# Patient Record
Sex: FEMALE | Race: WHITE | HISPANIC OR LATINO | Employment: OTHER | URBAN - METROPOLITAN AREA
[De-identification: names, ages, dates, MRNs, and addresses within clinical notes are randomized per-mention and may not be internally consistent; named-entity substitution may affect disease eponyms.]

---

## 2017-07-10 DIAGNOSIS — R14.0 BLOATING SYMPTOM: ICD-10-CM

## 2017-07-10 DIAGNOSIS — K21.9 GASTROESOPHAGEAL REFLUX DISEASE: ICD-10-CM

## 2017-07-10 DIAGNOSIS — R10.13 ABDOMINAL PAIN, EPIGASTRIC: Primary | ICD-10-CM

## 2017-07-24 ENCOUNTER — HOSPITAL ENCOUNTER (OUTPATIENT)
Dept: RADIOLOGY | Facility: OTHER | Age: 72
Discharge: HOME | End: 2017-07-24
Attending: INTERNAL MEDICINE
Payer: MEDICARE

## 2017-07-24 DIAGNOSIS — R14.0 BLOATING SYMPTOM: ICD-10-CM

## 2017-07-24 DIAGNOSIS — R10.13 ABDOMINAL PAIN, EPIGASTRIC: ICD-10-CM

## 2017-07-24 DIAGNOSIS — K21.9 GASTROESOPHAGEAL REFLUX DISEASE: ICD-10-CM

## 2017-07-24 PROCEDURE — 76700 US EXAM ABDOM COMPLETE: CPT | Mod: TC

## 2017-07-24 PROCEDURE — 76700 US EXAM ABDOM COMPLETE: CPT | Mod: 26,,, | Performed by: RADIOLOGY

## 2017-07-31 ENCOUNTER — HOSPITAL ENCOUNTER (OUTPATIENT)
Dept: RADIOLOGY | Facility: OTHER | Age: 72
Discharge: HOME | End: 2017-07-31
Attending: INTERNAL MEDICINE
Payer: MEDICARE

## 2017-07-31 DIAGNOSIS — K22.2 ESOPHAGEAL OBSTRUCTION: ICD-10-CM

## 2017-07-31 DIAGNOSIS — R14.0 BLOATING SYMPTOM: ICD-10-CM

## 2017-07-31 DIAGNOSIS — T18.2XXA FOREIGN BODY OF STOMACH, INITIAL ENCOUNTER: ICD-10-CM

## 2017-07-31 DIAGNOSIS — K21.9 GASTROESOPHAGEAL REFLUX: ICD-10-CM

## 2017-07-31 DIAGNOSIS — R10.13 ABDOMINAL PAIN, EPIGASTRIC: ICD-10-CM

## 2017-07-31 PROCEDURE — 78264 GASTRIC EMPTYING IMG STUDY: CPT | Mod: 26,,, | Performed by: RADIOLOGY

## 2017-07-31 PROCEDURE — 78264 GASTRIC EMPTYING IMG STUDY: CPT | Mod: TC

## 2018-12-04 ENCOUNTER — CLINICAL SUPPORT (OUTPATIENT)
Dept: AUDIOLOGY | Facility: CLINIC | Age: 73
End: 2018-12-04
Payer: MEDICARE

## 2018-12-04 ENCOUNTER — OFFICE VISIT (OUTPATIENT)
Dept: OTOLARYNGOLOGY | Facility: CLINIC | Age: 73
End: 2018-12-04
Payer: MEDICARE

## 2018-12-04 VITALS
WEIGHT: 160 LBS | SYSTOLIC BLOOD PRESSURE: 134 MMHG | DIASTOLIC BLOOD PRESSURE: 70 MMHG | HEART RATE: 75 BPM | TEMPERATURE: 98 F

## 2018-12-04 DIAGNOSIS — H90.3 SENSORINEURAL HEARING LOSS, BILATERAL: ICD-10-CM

## 2018-12-04 DIAGNOSIS — H93.11 TINNITUS, RIGHT EAR: ICD-10-CM

## 2018-12-04 DIAGNOSIS — J34.3 HYPERTROPHY OF INFERIOR NASAL TURBINATE: Primary | ICD-10-CM

## 2018-12-04 DIAGNOSIS — J35.1 TONSILLAR HYPERTROPHY: ICD-10-CM

## 2018-12-04 DIAGNOSIS — H69.91 NEGATIVE MIDDLE EAR PRESSURE OF RIGHT EAR: ICD-10-CM

## 2018-12-04 DIAGNOSIS — H93.13 TINNITUS, BILATERAL: Primary | ICD-10-CM

## 2018-12-04 PROCEDURE — 99999 PR PBB SHADOW E&M-EST. PATIENT-LVL III: CPT | Mod: PBBFAC,,, | Performed by: OTOLARYNGOLOGY

## 2018-12-04 PROCEDURE — 1100F PTFALLS ASSESS-DOCD GE2>/YR: CPT | Mod: CPTII,S$GLB,, | Performed by: OTOLARYNGOLOGY

## 2018-12-04 PROCEDURE — 3288F FALL RISK ASSESSMENT DOCD: CPT | Mod: CPTII,S$GLB,, | Performed by: OTOLARYNGOLOGY

## 2018-12-04 PROCEDURE — 92567 TYMPANOMETRY: CPT | Mod: S$GLB,,, | Performed by: AUDIOLOGIST

## 2018-12-04 PROCEDURE — 92557 COMPREHENSIVE HEARING TEST: CPT | Mod: S$GLB,,, | Performed by: AUDIOLOGIST

## 2018-12-04 PROCEDURE — 99203 OFFICE O/P NEW LOW 30 MIN: CPT | Mod: S$GLB,,, | Performed by: OTOLARYNGOLOGY

## 2018-12-04 RX ORDER — PREDNISONE 2.5 MG/1
TABLET ORAL DAILY
COMMUNITY
Start: 2014-11-24 | End: 2020-11-09

## 2018-12-04 RX ORDER — LISINOPRIL AND HYDROCHLOROTHIAZIDE 12.5; 2 MG/1; MG/1
1 TABLET ORAL DAILY
COMMUNITY
Start: 2018-09-20

## 2018-12-04 RX ORDER — DICLOFENAC SODIUM 10 MG/G
GEL TOPICAL
COMMUNITY
Start: 2015-04-09 | End: 2021-05-24

## 2018-12-04 RX ORDER — METHOTREXATE 2.5 MG/1
TABLET ORAL
COMMUNITY
Start: 2018-10-04 | End: 2018-12-04

## 2018-12-04 RX ORDER — METHOTREXATE 2.5 MG/1
TABLET ORAL
COMMUNITY
Start: 2014-11-24

## 2018-12-04 RX ORDER — ATORVASTATIN CALCIUM 20 MG/1
20 TABLET, FILM COATED ORAL DAILY
COMMUNITY
Start: 2018-11-05

## 2018-12-04 RX ORDER — DICLOFENAC SODIUM 10 MG/G
GEL TOPICAL
COMMUNITY
Start: 2018-11-06 | End: 2018-12-04

## 2018-12-04 RX ORDER — PREDNISONE 1 MG/1
1 TABLET ORAL 2 TIMES DAILY
COMMUNITY
Start: 2018-10-05 | End: 2022-08-24 | Stop reason: DRUGHIGH

## 2018-12-04 RX ORDER — AMLODIPINE BESYLATE 5 MG/1
5 TABLET ORAL DAILY
COMMUNITY
Start: 2018-11-05

## 2018-12-04 NOTE — PATIENT INSTRUCTIONS
Audiometry reviewed: asymmetric AD > AS SNHL  Pt. is a candidate for amplification for one ( right) or both ears  Tinnitus literature provided  Gentle middle ear insufflation techniques encouraged  E.T. Literature provided  Monitor hearing yearly  Noise protection encouraged prn

## 2018-12-04 NOTE — PROGRESS NOTES
"Subjective:       Patient ID: Naye Duncan is a 73 y.o. female.    Chief Complaint: Tinnitus    HPI: Ms. Duncan is a 73-year-old  female whose chief complaint is perception of a high-frequency "EEEEE", "cricket" type noise perceived in her right ear only x 6 months to 1 year in duration.  She had experienced tinnitus before but it went away spontaneously.  She indicates a previous history of ear fluid in earaches.  She indicates seeing a female ENT physician located in a practice on [facility] at [facility]; she was treated with pills ( Lipoflavanoids?) which the insurance did not cover; they did not help her tinnitus perception.  She is hoping I have some other treatment option which may help her aggravating tinnitus perception in the right ear. She has never worn hearing aids or considered them.  She used  to work at a [employer] facility for 8 years, 39 years ago.  She was exposed to high noise levels then.  She denies a significant family history of hearing loss.  She denies any significant history of head trauma or ear surgery or significant ear infections.  She is a right-handed individual.  She is receiving physical therapy for back and leg and neck problems.  Today's visit is her first here since July 2017 (for GI problems).    I have access to some of her medical records obtained from  the [facility] several years ago( 2014/2015) . Her medical problem list has included GERD, hypertension, hyperlipidemia, arthritis, asthma, IBS and impaired fasting glucose.  A note dated 04/09/2015 the [facility] Department indicated the patient's positive NAYE titer and history of arthritis treated with methotrexate +/-a prednisone burst.  She has a history of Nissen fundoplication for GERD symptoms.    A previous EGD had shown erosive esophagitis in stricture with symptoms improved by use of a PPI +/-H2 blocker. The notes also indicates her history of carpal tunnel " problems.    Allergies:  Penicillins  Review of Systems  History reviewed. No pertinent past medical history.  History reviewed. No pertinent surgical history.  Current Outpatient Medications on File Prior to Visit   Medication Sig Dispense Refill    Al hyd-Mg tr-alg ac-sod bicarb 80-20 mg Chew Take 1 tablet by mouth.      amLODIPine (NORVASC) 5 MG tablet       atorvastatin (LIPITOR) 20 MG tablet       diclofenac sodium (VOLTAREN) 1 % Gel Apply small amount to affected area q 8 hr PRN pain      lisinopril-hydrochlorothiazide (PRINZIDE,ZESTORETIC) 20-12.5 mg per tablet       medical supply, miscellaneous (MISCELLANEOUS MEDICAL SUPPLY MISC) 1 pair of knee high compression stockings      methotrexate 2.5 MG Tab       predniSONE (DELTASONE) 1 MG tablet       predniSONE (DELTASONE) 2.5 MG tablet        No current facility-administered medications on file prior to visit.            The patient completed an audiometric study performed by the [facility] audiology service.  The study is duplicated below and the results are reviewed with the patient in detail  Objective:         Blood pressure 134/70 pulse 75 temperature 98.3° weight 160 lb  General:  Alert and oriented lady in no acute distress  Both ears were examined under the microscope in the micro procedure room  Physical Exam   HENT:   Ears:    Nose:       Mouth/Throat:           Assessment:       1. Hypertrophy of inferior nasal turbinate    2. Tonsillar hypertrophy    3. Asymmetrical hearing loss of both ears    4. Tinnitus, right ear    5. Negative middle ear pressure of right ear        Plan:     Audiometry reviewed: asymmetric AD > AS SNHL  Pt. is a candidate for amplification for one ( right) or both ears  Tinnitus literature provided  Gentle middle ear insufflation techniques encouraged  E.T. Literature provided  Monitor hearing yearly  Noise protection encouraged prn

## 2019-08-01 ENCOUNTER — HOSPITAL ENCOUNTER (EMERGENCY)
Facility: HOSPITAL | Age: 74
Discharge: HOME | End: 2019-08-01
Attending: EMERGENCY MEDICINE
Payer: MEDICARE

## 2019-08-01 VITALS
RESPIRATION RATE: 16 BRPM | HEART RATE: 96 BPM | TEMPERATURE: 99 F | OXYGEN SATURATION: 98 % | SYSTOLIC BLOOD PRESSURE: 154 MMHG | DIASTOLIC BLOOD PRESSURE: 68 MMHG

## 2019-08-01 DIAGNOSIS — R94.31 ABNORMAL ECG: ICD-10-CM

## 2019-08-01 DIAGNOSIS — R42 DIZZINESS: Primary | ICD-10-CM

## 2019-08-01 LAB
ALBUMIN SERPL BCP-MCNC: 3.7 G/DL (ref 3.5–5.2)
ALP SERPL-CCNC: 88 U/L (ref 55–135)
ALT SERPL W/O P-5'-P-CCNC: 21 U/L (ref 10–44)
ANION GAP SERPL CALC-SCNC: 11 MMOL/L (ref 8–16)
AST SERPL-CCNC: 16 U/L (ref 10–40)
BASOPHILS # BLD AUTO: 0.03 K/UL (ref 0–0.2)
BASOPHILS NFR BLD: 0.3 % (ref 0–1.9)
BILIRUB SERPL-MCNC: 0.4 MG/DL (ref 0.1–1)
BUN SERPL-MCNC: 21 MG/DL (ref 8–23)
CALCIUM SERPL-MCNC: 9.6 MG/DL (ref 8.7–10.5)
CHLORIDE SERPL-SCNC: 103 MMOL/L (ref 95–110)
CO2 SERPL-SCNC: 25 MMOL/L (ref 23–29)
CREAT SERPL-MCNC: 0.9 MG/DL (ref 0.5–1.4)
DIFFERENTIAL METHOD: ABNORMAL
EOSINOPHIL # BLD AUTO: 0 K/UL (ref 0–0.5)
EOSINOPHIL NFR BLD: 0.2 % (ref 0–8)
ERYTHROCYTE [DISTWIDTH] IN BLOOD BY AUTOMATED COUNT: 15.9 % (ref 11.5–14.5)
EST. GFR  (AFRICAN AMERICAN): >60 ML/MIN/1.73 M^2
EST. GFR  (NON AFRICAN AMERICAN): >60 ML/MIN/1.73 M^2
GLUCOSE SERPL-MCNC: 196 MG/DL (ref 70–110)
HCT VFR BLD AUTO: 39.6 % (ref 37–48.5)
HGB BLD-MCNC: 12.4 G/DL (ref 12–16)
IMM GRANULOCYTES # BLD AUTO: 0.05 K/UL (ref 0–0.04)
IMM GRANULOCYTES NFR BLD AUTO: 0.5 % (ref 0–0.5)
INR PPP: 0.9 (ref 0.8–1.2)
LYMPHOCYTES # BLD AUTO: 1.4 K/UL (ref 1–4.8)
LYMPHOCYTES NFR BLD: 13.1 % (ref 18–48)
MAGNESIUM SERPL-MCNC: 2.2 MG/DL (ref 1.6–2.6)
MCH RBC QN AUTO: 28.4 PG (ref 27–31)
MCHC RBC AUTO-ENTMCNC: 31.3 G/DL (ref 32–36)
MCV RBC AUTO: 91 FL (ref 82–98)
MONOCYTES # BLD AUTO: 0.8 K/UL (ref 0.3–1)
MONOCYTES NFR BLD: 7.3 % (ref 4–15)
NEUTROPHILS # BLD AUTO: 8.5 K/UL (ref 1.8–7.7)
NEUTROPHILS NFR BLD: 78.6 % (ref 38–73)
NRBC BLD-RTO: 0 /100 WBC
PHOSPHATE SERPL-MCNC: 3 MG/DL (ref 2.7–4.5)
PLATELET # BLD AUTO: 276 K/UL (ref 150–350)
PMV BLD AUTO: 10.1 FL (ref 9.2–12.9)
POTASSIUM SERPL-SCNC: 3.4 MMOL/L (ref 3.5–5.1)
PROT SERPL-MCNC: 7.6 G/DL (ref 6–8.4)
PROTHROMBIN TIME: 9.7 SEC (ref 9–12.5)
RBC # BLD AUTO: 4.37 M/UL (ref 4–5.4)
SODIUM SERPL-SCNC: 139 MMOL/L (ref 136–145)
TROPONIN I SERPL DL<=0.01 NG/ML-MCNC: <0.006 NG/ML (ref 0–0.03)
TSH SERPL DL<=0.005 MIU/L-ACNC: 1.03 UIU/ML (ref 0.4–4)
WBC # BLD AUTO: 10.85 K/UL (ref 3.9–12.7)

## 2019-08-01 PROCEDURE — 99283 EMERGENCY DEPT VISIT LOW MDM: CPT | Mod: ,,, | Performed by: EMERGENCY MEDICINE

## 2019-08-01 PROCEDURE — 93010 ELECTROCARDIOGRAM REPORT: CPT | Mod: ,,, | Performed by: INTERNAL MEDICINE

## 2019-08-01 PROCEDURE — 93010 EKG 12-LEAD: ICD-10-PCS | Mod: ,,, | Performed by: INTERNAL MEDICINE

## 2019-08-01 PROCEDURE — 84443 ASSAY THYROID STIM HORMONE: CPT

## 2019-08-01 PROCEDURE — 83735 ASSAY OF MAGNESIUM: CPT

## 2019-08-01 PROCEDURE — 80053 COMPREHEN METABOLIC PANEL: CPT

## 2019-08-01 PROCEDURE — 85610 PROTHROMBIN TIME: CPT

## 2019-08-01 PROCEDURE — 84484 ASSAY OF TROPONIN QUANT: CPT

## 2019-08-01 PROCEDURE — 93005 ELECTROCARDIOGRAM TRACING: CPT

## 2019-08-01 PROCEDURE — 63600175 PHARM REV CODE 636 W HCPCS: Performed by: EMERGENCY MEDICINE

## 2019-08-01 PROCEDURE — 85025 COMPLETE CBC W/AUTO DIFF WBC: CPT

## 2019-08-01 PROCEDURE — 25000003 PHARM REV CODE 250: Performed by: EMERGENCY MEDICINE

## 2019-08-01 PROCEDURE — 84100 ASSAY OF PHOSPHORUS: CPT

## 2019-08-01 PROCEDURE — 99285 EMERGENCY DEPT VISIT HI MDM: CPT | Mod: 25

## 2019-08-01 PROCEDURE — 99283 PR EMERGENCY DEPT VISIT,LEVEL III: ICD-10-PCS | Mod: ,,, | Performed by: EMERGENCY MEDICINE

## 2019-08-01 RX ORDER — POTASSIUM CHLORIDE 20 MEQ/15ML
60 SOLUTION ORAL
Status: COMPLETED | OUTPATIENT
Start: 2019-08-01 | End: 2019-08-01

## 2019-08-01 RX ORDER — MECLIZINE HCL 12.5 MG 12.5 MG/1
12.5 TABLET ORAL 3 TIMES DAILY PRN
Qty: 21 TABLET | Refills: 0 | Status: SHIPPED | OUTPATIENT
Start: 2019-08-01 | End: 2020-11-09

## 2019-08-01 RX ORDER — MECLIZINE HCL 12.5 MG 12.5 MG/1
25 TABLET ORAL
Status: COMPLETED | OUTPATIENT
Start: 2019-08-01 | End: 2019-08-01

## 2019-08-01 RX ADMIN — MECLIZINE 25 MG: 12.5 TABLET ORAL at 09:08

## 2019-08-01 RX ADMIN — SODIUM CHLORIDE, SODIUM LACTATE, POTASSIUM CHLORIDE, AND CALCIUM CHLORIDE 1000 ML: .6; .31; .03; .02 INJECTION, SOLUTION INTRAVENOUS at 09:08

## 2019-08-01 RX ADMIN — POTASSIUM CHLORIDE 60 MEQ: 20 SOLUTION ORAL at 09:08

## 2019-08-02 NOTE — ED TRIAGE NOTES
"Pt reports heart palpitation, dizziness.  pt states " I have problem with my heart and my doctor advised me to come to ED" denies chest pain, sob, n/v. Pt reports left arm pain few days ago but states she it was her arthritis. Woke up today feeling really dizzy.      LOC: The patient is awake, alert, aware of environment with an appropriate affect. Oriented x4, speaking appropriately  APPEARANCE: Pt resting comfortably, in no acute distress, pt is clean and well groomed, clothing properly fastened  SKIN:The skin is warm and dry, color consistent with ethnicity, patient has normal skin turgor and moist mucus membranes  RESPIRATORY:Airway is open and patent, respirations are spontaneous, patient has a normal effort and rate, no accessory muscle use noted.  CARDIAC: fast rate and irregular rhythm, no peripheral edema noted, capillary refill < 3 seconds, bilateral radial pulses 2+.  NEUROLOGIC: PERRLA, facial expression is symmetrical, patient moving all extremities spontaneously, normal sensation in all extremities when touched with a finger.  Follows all commands appropriately  MUSCULOSKELETAL: Patient moving all extremities spontaneously, no obvious swelling or deformities noted.         "

## 2019-08-02 NOTE — ED PROVIDER NOTES
Encounter Date: 8/1/2019    SCRIBE #1 NOTE: I, Magdaleno De León, am scribing for, and in the presence of,  Dr. Greenberg. I have scribed the entire note.       History     Chief Complaint   Patient presents with    Abnormal ECG     [facility] from [facility] due to abn EKG. Patient reports dizziness. Denies CP or SOB     Patient is a 74 year old female who presents to the ED with an abnormal EKG. Patient notes dizziness since this morning.and right shoulder pain since Tuesday night. Also mentions sweating last night as well.  Went to doctors of [facility] who sent the patient here. Dizziness has improved since waking up. Patient had fusion of a herniated disc at L3 and L4. Also mentions having a fever a few days ago. Denies coughing, shortness of breath, difficulty swallowing, nausea, or vomiting. States she does not have a pacemaker. States ears are at baseline. No fever in the past 24 hours but said she had some feverish symptoms about 3 days ago.     The history is provided by the patient.     Review of patient's allergies indicates:   Allergen Reactions    Penicillins      No past medical history on file.  No past surgical history on file.  No family history on file.  Social History     Tobacco Use    Smoking status: Never Smoker   Substance Use Topics    Alcohol use: Not on file    Drug use: Not on file     Review of Systems   Constitutional: Positive for diaphoresis. Negative for fever.   HENT: Negative for trouble swallowing.    Respiratory: Negative for cough and shortness of breath.    Cardiovascular: Positive for chest pain. Negative for palpitations.   Gastrointestinal: Negative for nausea and vomiting.   Endocrine: Positive for polydipsia.   Musculoskeletal:        Shoulder pain to right.     Neurological: Positive for dizziness.   All other systems reviewed and are negative.      Physical Exam     Initial Vitals [08/01/19 1812]   BP Pulse Resp Temp SpO2   (!) 154/68 96 16 99.3 °F (37.4 °C) 98 %       MAP       --         Physical Exam    Vitals reviewed.  Constitutional: She appears well-developed and well-nourished.   HENT:   Head: Normocephalic and atraumatic.   Mouth/Throat: No oral lesions.   Eyes: EOM are normal. Pupils are equal, round, and reactive to light.   Neck: No tracheal deviation present. No JVD present.   Cardiovascular: An irregularly irregular rhythm present.    Pulmonary/Chest: Breath sounds normal. No stridor. No respiratory distress.   Abdominal: Soft. Bowel sounds are normal. She exhibits no distension. There is no tenderness.   Neurological: She is alert. She has normal strength and normal reflexes.   Patient describes mild dizziness. Able to ambulate unassisted.         ED Course   Procedures  Labs Reviewed   CBC W/ AUTO DIFFERENTIAL - Abnormal; Notable for the following components:       Result Value    Mean Corpuscular Hemoglobin Conc 31.3 (*)     RDW 15.9 (*)     Gran # (ANC) 8.5 (*)     Immature Grans (Abs) 0.05 (*)     Gran% 78.6 (*)     Lymph% 13.1 (*)     All other components within normal limits   COMPREHENSIVE METABOLIC PANEL - Abnormal; Notable for the following components:    Potassium 3.4 (*)     Glucose 196 (*)     All other components within normal limits   PROTIME-INR   TSH   TROPONIN I   MAGNESIUM   PHOSPHORUS   URINALYSIS, REFLEX TO URINE CULTURE     EKG Readings: (Independently Interpreted)   Rhythm: Sinus Arrhythmia. Heart Rate: 96. Ectopy: Less than or equal to 6/min PACs. ST Segments: Normal ST Segments. Axis: Normal. Other Findings: Shortened IN Interval.       Imaging Results          X-Ray Chest PA And Lateral (Final result)  Result time 08/01/19 20:39:22    Final result by Sharmila Rodney MD (08/01/19 20:39:22)                 Impression:      No failure or pneumonia.      Electronically signed by: Sharmila Rodney  Date:    08/01/2019  Time:    20:39             Narrative:    EXAMINATION:  XR CHEST PA AND LATERAL    CLINICAL  HISTORY:  dizziness;    TECHNIQUE:  PA and lateral views of the chest were performed.    COMPARISON:  None    FINDINGS:  Frontal lateral views presented.  Heart appears normal in size on this modest inspiratory exam.  There is a 5 mm calcified granuloma the right inferior lung incidentally noted.  No pneumothorax or pleural effusion or interstitial edema or consolidation.  No alveolar pattern.  Trachea appears normal.  Visualized bowel gas pattern appears normal.  No fracture found.                              X-Rays:   Independently Interpreted Readings:   Other Readings:  9:20PM  CXR:No acute findings    Medical Decision Making:   History:   Old Medical Records: I decided to obtain old medical records.  Differential Diagnosis:   Congenital heart disease vs. vertigo vs electrolyte derangement vs. dehydration  Independently Interpreted Test(s):   I have ordered and independently interpreted X-rays - see prior notes.  I have ordered and independently interpreted EKG Reading(s) - see prior notes  Clinical Tests:   Lab Tests: Ordered and Reviewed  Radiological Study: Ordered and Reviewed  Medical Tests: Ordered and Reviewed  Other:   I have discussed this case with another health care provider.       <> Summary of the Discussion: Cardiology on-call has reviewed the EKG and does not feel that it presents any evidence of acute arrhythmia or indication for treatment at this time.            Scribe Attestation:   Scribe #1: I performed the above scribed service and the documentation accurately describes the services I performed. I attest to the accuracy of the note.    Attending Attestation:             Attending ED Notes:   Laboratory evaluation today reveals evidence of minimal hypokalemia and mild granulocytosis only without evidence of anemia, electrolyte derangement, or solid organ injury. EKG does reveal findings of multiple PACs and a shortened TN interval in this patient presenting with report of chronic  congenital heart disease of unknown diagnosis but without associated chest pain or shortness of breath.  She reports that her presenting complaint of dizziness has been improved with emergency department therapy.  I do not feel that further imaging or inpatient evaluation is indicated at this time for this patient with improving, recurrent vertigo symptoms.  She will be discharged home in improved condition with prescriptions for meclizine to be taken as needed for recurrent episodes of dizziness, and I have recommended she follow up with Cardiology to further evaluate her congenital cardiac disease and return to the ED as needed for urgent concerns.             Clinical Impression:       ICD-10-CM ICD-9-CM   1. Dizziness R42 780.4   2. Abnormal ECG R94.31 794.31         Disposition:   Disposition: Discharged  Condition: Stable                        Gerson Greenberg MD  08/01/19 [id number]

## 2019-08-03 ENCOUNTER — HOSPITAL ENCOUNTER (EMERGENCY)
Facility: HOSPITAL | Age: 74
Discharge: HOME | End: 2019-08-03
Attending: EMERGENCY MEDICINE
Payer: MEDICARE

## 2019-08-03 VITALS
HEART RATE: 76 BPM | TEMPERATURE: 99 F | RESPIRATION RATE: 19 BRPM | SYSTOLIC BLOOD PRESSURE: 139 MMHG | OXYGEN SATURATION: 96 % | DIASTOLIC BLOOD PRESSURE: 62 MMHG | WEIGHT: 154 LBS | BODY MASS INDEX: 28.34 KG/M2 | HEIGHT: 62 IN

## 2019-08-03 DIAGNOSIS — K52.9 COLITIS: Primary | ICD-10-CM

## 2019-08-03 DIAGNOSIS — K92.2 GI BLEED: ICD-10-CM

## 2019-08-03 LAB
ABO + RH BLD: NORMAL
ALBUMIN SERPL BCP-MCNC: 3.7 G/DL (ref 3.5–5.2)
ALP SERPL-CCNC: 77 U/L (ref 55–135)
ALT SERPL W/O P-5'-P-CCNC: 17 U/L (ref 10–44)
ANION GAP SERPL CALC-SCNC: 14 MMOL/L (ref 8–16)
APTT BLDCRRT: 23 SEC (ref 21–32)
AST SERPL-CCNC: 18 U/L (ref 10–40)
BASOPHILS # BLD AUTO: 0.03 K/UL (ref 0–0.2)
BASOPHILS NFR BLD: 0.2 % (ref 0–1.9)
BILIRUB SERPL-MCNC: 0.5 MG/DL (ref 0.1–1)
BLD GP AB SCN CELLS X3 SERPL QL: NORMAL
BUN SERPL-MCNC: 23 MG/DL (ref 8–23)
BUN SERPL-MCNC: 27 MG/DL (ref 6–30)
CALCIUM SERPL-MCNC: 9.4 MG/DL (ref 8.7–10.5)
CHLORIDE SERPL-SCNC: 107 MMOL/L (ref 95–110)
CHLORIDE SERPL-SCNC: 107 MMOL/L (ref 95–110)
CO2 SERPL-SCNC: 20 MMOL/L (ref 23–29)
CREAT SERPL-MCNC: 0.7 MG/DL (ref 0.5–1.4)
CREAT SERPL-MCNC: 0.8 MG/DL (ref 0.5–1.4)
DIFFERENTIAL METHOD: ABNORMAL
EOSINOPHIL # BLD AUTO: 0.1 K/UL (ref 0–0.5)
EOSINOPHIL NFR BLD: 1 % (ref 0–8)
ERYTHROCYTE [DISTWIDTH] IN BLOOD BY AUTOMATED COUNT: 16.1 % (ref 11.5–14.5)
EST. GFR  (AFRICAN AMERICAN): >60 ML/MIN/1.73 M^2
EST. GFR  (NON AFRICAN AMERICAN): >60 ML/MIN/1.73 M^2
GLUCOSE SERPL-MCNC: 102 MG/DL (ref 70–110)
GLUCOSE SERPL-MCNC: 98 MG/DL (ref 70–110)
HCT VFR BLD AUTO: 39.2 % (ref 37–48.5)
HCT VFR BLD CALC: 38 %PCV (ref 36–54)
HGB BLD-MCNC: 12.6 G/DL (ref 12–16)
IMM GRANULOCYTES # BLD AUTO: 0.06 K/UL (ref 0–0.04)
IMM GRANULOCYTES NFR BLD AUTO: 0.5 % (ref 0–0.5)
INR PPP: 0.9 (ref 0.8–1.2)
LYMPHOCYTES # BLD AUTO: 1.8 K/UL (ref 1–4.8)
LYMPHOCYTES NFR BLD: 13.9 % (ref 18–48)
MCH RBC QN AUTO: 28.4 PG (ref 27–31)
MCHC RBC AUTO-ENTMCNC: 32.1 G/DL (ref 32–36)
MCV RBC AUTO: 88 FL (ref 82–98)
MONOCYTES # BLD AUTO: 1 K/UL (ref 0.3–1)
MONOCYTES NFR BLD: 8 % (ref 4–15)
NEUTROPHILS # BLD AUTO: 9.7 K/UL (ref 1.8–7.7)
NEUTROPHILS NFR BLD: 76.4 % (ref 38–73)
NRBC BLD-RTO: 0 /100 WBC
PLATELET # BLD AUTO: 319 K/UL (ref 150–350)
PMV BLD AUTO: 10 FL (ref 9.2–12.9)
POC IONIZED CALCIUM: 1.1 MMOL/L (ref 1.06–1.42)
POC TCO2 (MEASURED): 24 MMOL/L (ref 23–29)
POTASSIUM BLD-SCNC: 3.9 MMOL/L (ref 3.5–5.1)
POTASSIUM SERPL-SCNC: 4 MMOL/L (ref 3.5–5.1)
PROT SERPL-MCNC: 7.4 G/DL (ref 6–8.4)
PROTHROMBIN TIME: 9.6 SEC (ref 9–12.5)
RBC # BLD AUTO: 4.44 M/UL (ref 4–5.4)
SAMPLE: NORMAL
SODIUM BLD-SCNC: 141 MMOL/L (ref 136–145)
SODIUM SERPL-SCNC: 141 MMOL/L (ref 136–145)
WBC # BLD AUTO: 12.7 K/UL (ref 3.9–12.7)

## 2019-08-03 PROCEDURE — 80053 COMPREHEN METABOLIC PANEL: CPT

## 2019-08-03 PROCEDURE — 93010 ELECTROCARDIOGRAM REPORT: CPT | Mod: ,,, | Performed by: INTERNAL MEDICINE

## 2019-08-03 PROCEDURE — 96361 HYDRATE IV INFUSION ADD-ON: CPT

## 2019-08-03 PROCEDURE — 25000003 PHARM REV CODE 250: Performed by: EMERGENCY MEDICINE

## 2019-08-03 PROCEDURE — 93010 EKG 12-LEAD: ICD-10-PCS | Mod: ,,, | Performed by: INTERNAL MEDICINE

## 2019-08-03 PROCEDURE — 99284 EMERGENCY DEPT VISIT MOD MDM: CPT | Mod: ,,, | Performed by: EMERGENCY MEDICINE

## 2019-08-03 PROCEDURE — 85730 THROMBOPLASTIN TIME PARTIAL: CPT

## 2019-08-03 PROCEDURE — 85610 PROTHROMBIN TIME: CPT

## 2019-08-03 PROCEDURE — 99284 PR EMERGENCY DEPT VISIT,LEVEL IV: ICD-10-PCS | Mod: ,,, | Performed by: EMERGENCY MEDICINE

## 2019-08-03 PROCEDURE — 86901 BLOOD TYPING SEROLOGIC RH(D): CPT

## 2019-08-03 PROCEDURE — 63600175 PHARM REV CODE 636 W HCPCS: Performed by: EMERGENCY MEDICINE

## 2019-08-03 PROCEDURE — 99284 EMERGENCY DEPT VISIT MOD MDM: CPT | Mod: 25

## 2019-08-03 PROCEDURE — 25500020 PHARM REV CODE 255: Performed by: EMERGENCY MEDICINE

## 2019-08-03 PROCEDURE — 93005 ELECTROCARDIOGRAM TRACING: CPT

## 2019-08-03 PROCEDURE — 85025 COMPLETE CBC W/AUTO DIFF WBC: CPT

## 2019-08-03 PROCEDURE — 96374 THER/PROPH/DIAG INJ IV PUSH: CPT | Mod: 59

## 2019-08-03 RX ORDER — DICYCLOMINE HYDROCHLORIDE 10 MG/1
20 CAPSULE ORAL
Status: COMPLETED | OUTPATIENT
Start: 2019-08-03 | End: 2019-08-03

## 2019-08-03 RX ORDER — DIPHENHYDRAMINE HYDROCHLORIDE 50 MG/ML
25 INJECTION INTRAMUSCULAR; INTRAVENOUS
Status: COMPLETED | OUTPATIENT
Start: 2019-08-03 | End: 2019-08-03

## 2019-08-03 RX ORDER — PANTOPRAZOLE SODIUM 40 MG/10ML
40 INJECTION, POWDER, LYOPHILIZED, FOR SOLUTION INTRAVENOUS
Status: DISCONTINUED | OUTPATIENT
Start: 2019-08-03 | End: 2019-08-03

## 2019-08-03 RX ORDER — METRONIDAZOLE 500 MG/1
500 TABLET ORAL EVERY 8 HOURS
Qty: 30 TABLET | Refills: 0 | Status: SHIPPED | OUTPATIENT
Start: 2019-08-03 | End: 2019-08-13

## 2019-08-03 RX ORDER — CIPROFLOXACIN 500 MG/1
500 TABLET ORAL EVERY 12 HOURS
Qty: 20 TABLET | Refills: 0 | Status: SHIPPED | OUTPATIENT
Start: 2019-08-03 | End: 2019-08-13

## 2019-08-03 RX ADMIN — DICYCLOMINE HYDROCHLORIDE 20 MG: 10 CAPSULE ORAL at 08:08

## 2019-08-03 RX ADMIN — SODIUM CHLORIDE 1000 ML: 0.9 INJECTION, SOLUTION INTRAVENOUS at 08:08

## 2019-08-03 RX ADMIN — IOHEXOL 100 ML: 350 INJECTION, SOLUTION INTRAVENOUS at 10:08

## 2019-08-03 RX ADMIN — DIPHENHYDRAMINE HYDROCHLORIDE 25 MG: 50 INJECTION INTRAMUSCULAR; INTRAVENOUS at 10:08

## 2019-08-03 NOTE — ED PROVIDER NOTES
"Encounter Date: 8/3/2019       History     Chief Complaint   Patient presents with    Rectal Bleeding     diarrhea yesterday and noticed "light red" blood, "dark stool", pt "thought it was her hemorrhoids". not on blood thinners. seen in ER on 8/1 for palpitations, but had no rectal bleeding then. had low K.      73 yo W with pmhx of RA on MTX, HTN, hemorrhoids presents with a chief complaint of BRBPR.  Patient reports onset of symptoms was yesterday.  Patient had an episode of severe diarrhea, lasting 2 hours in length.  Subsequently, she had an episode of dark red blood mixed with stool.  Since that time, she has had 5 additional episodes of BRBPR.  She reports blood is bright red.  No clots.  Episodes occur even when she does not feel the urge to have a bowel movement.  This is associated with lightheadedness and cramping generalized abdominal pain. No history of BRBPR.  No anticoagulants.  Patient does take a daily ASA 81.  She denies any chest pain or shortness of breath. Abdominal surgical history remarkable for a tummy tuck as well as an unknown procedure on an ulcer in the 1980s.  Her last colonoscopy was in 1988.  No history of similar symptoms. Patient initially attributed her symptoms to hemorrhoids.  Of note, patient was in the ED 2 days prior for palpitations and found to have a sinus arrhythmia.        Review of patient's allergies indicates:   Allergen Reactions    Penicillins      Past Medical History:   Diagnosis Date    Arthritis     GERD (gastroesophageal reflux disease)     HTN (hypertension)      History reviewed. No pertinent surgical history.  History reviewed. No pertinent family history.  Social History     Tobacco Use    Smoking status: Never Smoker   Substance Use Topics    Alcohol use: Not on file    Drug use: Not on file     Review of Systems   Constitutional: Positive for fatigue. Negative for fever.   HENT: Negative for congestion.    Eyes: Negative for discharge. "   Respiratory: Negative for shortness of breath.    Cardiovascular: Negative for chest pain.   Gastrointestinal: Positive for abdominal pain, blood in stool and diarrhea. Negative for abdominal distention, constipation, nausea and vomiting.   Endocrine: Negative for polyuria.   Genitourinary: Negative for dysuria.   Musculoskeletal: Negative for back pain.   Skin: Negative for wound.   Allergic/Immunologic: Negative for immunocompromised state.   Neurological: Negative for headaches.   Hematological: Does not bruise/bleed easily.   Psychiatric/Behavioral: Negative for confusion.       Physical Exam     Initial Vitals [08/03/19 0719]   BP Pulse Resp Temp SpO2   134/83 93 18 98.5 °F (36.9 °C) 99 %      MAP       --         Physical Exam    Nursing note and vitals reviewed.  Constitutional: She appears well-developed and well-nourished. She is not diaphoretic. No distress.   HENT:   Head: Normocephalic and atraumatic.   Eyes: EOM are normal. Pupils are equal, round, and reactive to light. Right eye exhibits no discharge. Left eye exhibits no discharge. No scleral icterus.   Conjunctival pallor   Neck: Normal range of motion. Neck supple. No JVD present.   Cardiovascular: Normal rate, normal heart sounds and intact distal pulses. An irregular rhythm present.  Exam reveals no gallop and no friction rub.    No murmur heard.  Pulmonary/Chest: Breath sounds normal. No respiratory distress. She has no wheezes. She has no rhonchi. She has no rales. She exhibits no tenderness.   Abdominal: Soft. Bowel sounds are normal. She exhibits no distension and no mass. There is tenderness. There is no rebound and no guarding.   RLQ tenderness with voluntary guarding, mild suprapubic and LLQ tenderness   Genitourinary: Rectal exam shows guaiac positive stool. Guaiac positive stool. : Acceptable.  Genitourinary Comments: Dried clots and perirectal area, and vault, guaiac positive; small external hemorrhoid appreciated at  5 o'clock position   Musculoskeletal: Normal range of motion. She exhibits no edema or tenderness.   Lymphadenopathy:     She has no cervical adenopathy.   Neurological: She is alert and oriented to person, place, and time. She has normal strength. No sensory deficit.   Skin: Skin is warm and dry. Capillary refill takes less than 2 seconds.   Psychiatric:   Mildly anxious         ED Course   Procedures  Labs Reviewed   CBC W/ AUTO DIFFERENTIAL - Abnormal; Notable for the following components:       Result Value    RDW 16.1 (*)     Gran # (ANC) 9.7 (*)     Immature Grans (Abs) 0.06 (*)     Gran% 76.4 (*)     Lymph% 13.9 (*)     All other components within normal limits   COMPREHENSIVE METABOLIC PANEL - Abnormal; Notable for the following components:    CO2 20 (*)     All other components within normal limits   PROTIME-INR   APTT   TYPE & SCREEN   ISTAT PROCEDURE        ECG Results          EKG 12-lead (Final result)  Result time 08/03/19 10:27:24    Final result by Interface, Lab In [facility] (08/03/19 10:27:24)                 Narrative:    Test Reason : K92.2,    Vent. Rate : 081 BPM     Atrial Rate : 081 BPM     P-R Int : 128 ms          QRS Dur : 062 ms      QT Int : 388 ms       P-R-T Axes : 081 043 048 degrees     QTc Int : 450 ms    Sinus rhythm with Premature atrial complexes  Low voltage QRS  Abnormal ECG  When compared with ECG of 01-AUG-2019 18:16,  Sinus rhythm has replaced Junctional rhythm  Nonspecific T wave abnormality has replaced inverted T waves in Inferior  leads  Confirmed by RAMIRO FORD MD ([id number]) on 8/3/2019 10:27:13 AM    Referred By: [email or web address]   SELF           Confirmed By:RAMIRO FORD MD                            Imaging Results           CTA Abdomen and Pelvis (Final result)  Result time 08/03/19 10:43:24    Final result by Ramón Gutiérrez MD (08/03/19 10:43:24)                 Impression:      Long segment of bowel wall thickening and pericolonic fat stranding involving the splenic  flexure and the transverse colon concerning for active colitis.    Colonic diverticulosis.    No evidence of intraluminal contrast extravasation in the bowel to suggest active bleeding.    Other incidental findings as above.    This report was flagged in Epic as abnormal.    Electronically signed by resident: Kevin Barnes MD  Date:    08/03/2019  Time:    10:21    Electronically signed by: Ramón Gutiérrez MD  Date:    08/03/2019  Time:    10:43             Narrative:    EXAMINATION:  CTA ABDOMEN AND PELVIS    CLINICAL HISTORY:  GI bleeding;    TECHNIQUE:  Low dose axial images, sagittal and coronal reformations were obtained from the lung bases to the pubic symphysis before and after the IV administration of 100 mL of Omnipaque 350 .  Oral contrast was not administered.  Delayed images were obtained per GI bleed protocol.    COMPARISON:  Abdominal ultrasound 07/24/2017.    FINDINGS:  Heart size within normal limits.  No significant pericardial effusion.    - Lung bases: The lung bases are clear with no evidence of consolidation or pleural effusion.  Subcentimeter calcified granuloma within the right lower lobe.    - Liver: Normal in size and enhancement.  No evidence of focal lesions.    - Gallbladder: No calcified gallstones.    - Bile Ducts: No evidence of intra or extra hepatic biliary ductal dilation.    - Spleen: Unremarkable.    - Kidneys: Both kidneys demonstrate normal size and enhancement.  Mild bilateral cortical thinning.  No evidence of hydronephrosis or hydroureter.  No evidence of nephrolithiasis.  The bladder is partially distended and appear unremarkable.    - Adrenals: Unremarkable.    - Pancreas: No mass or peripancreatic fat stranding.    - Retroperitoneum:  No significant adenopathy.    - Vascular: Calcific atherosclerosis of the abdominal aorta and its branches with no evidence of aneurysmal dilatation.    - Abdominal wall:  Unremarkable.    Pelvis:    No pelvic mass, adenopathy, or free fluid.   The uterus is surgically absent.    Bowel/Mesentery:    Changes of a prior gastric surgery.    Extensive colonic diverticulosis.  There is a long segment of bowel wall thickening and pericolonic fat stranding involving the splenic flexure and the transverse colon concerning for active colitis.  No evidence of free intraperitoneal air or free fluid.  No evidence of bowel dilatation or bowel obstruction.  No evidence of intraluminal contrast extravasation in the bowel to suggest active bleeding.    Bones:  No acute osseous abnormality and no suspicious lytic or blastic lesion.  Degenerative changes of the spine with postsurgical changes of posterior decompression laminectomy of the lower lumbar spine.                                 Medical Decision Making:   History:   I obtained history from: someone other than patient.  Old Medical Records: I decided to obtain old medical records.  Initial Assessment:   75 yo W with pmhx of RA on MTX, HTN, hemorrhoids presents with a chief complaint of BRBPR.    Differential Diagnosis:   This patient's differential diagnosis includes, but is not limited to: variceal bleed, rectal injury, colitis, hemorrhoids(internal or external), gastric ulcer, upper GI hemorrhage, AVM, colon CA, polyps, diverticulitis, colitis.      ED Management:  Will administer IV fluids, Protonix, Bentyl.  Will obtain labs and CTA A/P to investigate.    Reassessment: CBC with hemoglobin of 12.6, up from 12.4 on August 1st.  Coags normal. CMP normal. BUN is 23.  Patient remains hemodynamically stable. Awaiting CT.    Reassessment:  CT reveals colitis.  On reassessment, patient remains well appearing.  She reportedly had 1 additional bowel movement while in the ED but was smaller volume.  She denies any lightheadedness at this time.  Patient's symptoms are overall consistent with colitis given the diarrhea and the bleeding.  This is inconsistent with a upper GI bleed.  Patient will be discharged on course of  Cipro and Flagyl.  Provided with extensive return precautions.                       Clinical Impression:       ICD-10-CM ICD-9-CM   1. Colitis K52.9 558.9   2. GI bleed K92.2 578.9                                Yogesh Valadez MD  08/03/19 1143

## 2019-08-03 NOTE — DISCHARGE INSTRUCTIONS
The instructions are written for a child but similar advice goes to you.  Take the 2 antibiotics.  Drink plenty of fluids.  Return to the emergency department for any severe lightheadedness, worsening bleeding, or other concerning symptoms.

## 2019-08-03 NOTE — ED TRIAGE NOTES
Jocelyne Duncan, a 74 y.o. female presents to the ED with complaints of rectal bleeding with diarrhea and generalized abdominal cramping since yesterday. States stool is black with light red streaks.  6-7 episode since yesterday. Reports weakness. Reports history of hemmorhoids, denies blood thinners. Denies history of GI bleed. Reports 2 procedures for GERD many years ago- cannot remember name of procedure.     Accompanied by .     Dr. Valadez at bedside.     Patient assisted onto ED stretcher and changed into gown. Patient placed on cardiac monitor, continuous pulse oximetry, and automatic blood pressure cuff.     LOC: The patient is awake, alert and aware of environment with an appropriate affect.  APPEARANCE: Patient appears anxious but in no acute distress, patient is clean and well groomed.  HENT: Head is normocephalic and atraumatic. Oropharynx is clear and moist.   SKIN: The skin is warm and dry, dry mucus membranes, skin intact, no breakdown or brusing noted.  MUSKULOSKELETAL: Patient moving all extremities well, no obvious swelling or deformities noted. Reports some pain upon movement to RUE due to arthritis.   RESPIRATORY: Airway is open and patent, respirations are spontaneous, patient has a normal effort and rate. Breath sounds are equal and clear.      CARDIAC: normal rate. No peripheral edema.  ABDOMEN: Soft but tender to palpation to lower quadrants, no distention noted. Bowel sounds present.  : No complaints of frequency, burning, urgency or blood in the urine.   NEURO: Oriented to person, place, time, and situation. Speaking clear and appropriately.

## 2019-08-06 ENCOUNTER — OFFICE VISIT (OUTPATIENT)
Dept: CARDIOLOGY | Facility: CLINIC | Age: 74
End: 2019-08-06
Payer: MEDICARE

## 2019-08-06 VITALS
WEIGHT: 152.56 LBS | HEART RATE: 77 BPM | HEIGHT: 62 IN | SYSTOLIC BLOOD PRESSURE: 115 MMHG | BODY MASS INDEX: 28.07 KG/M2 | DIASTOLIC BLOOD PRESSURE: 58 MMHG

## 2019-08-06 DIAGNOSIS — I49.1 PREMATURE ATRIAL COMPLEXES: ICD-10-CM

## 2019-08-06 PROCEDURE — 99203 OFFICE O/P NEW LOW 30 MIN: CPT | Mod: GC,S$GLB,, | Performed by: INTERNAL MEDICINE

## 2019-08-06 PROCEDURE — 99999 PR PBB SHADOW E&M-EST. PATIENT-LVL III: ICD-10-PCS | Mod: PBBFAC,GC,, | Performed by: INTERNAL MEDICINE

## 2019-08-06 PROCEDURE — 99203 PR OFFICE/OUTPT VISIT, NEW, LEVL III, 30-44 MIN: ICD-10-PCS | Mod: GC,S$GLB,, | Performed by: INTERNAL MEDICINE

## 2019-08-06 PROCEDURE — 99999 PR PBB SHADOW E&M-EST. PATIENT-LVL III: CPT | Mod: PBBFAC,GC,, | Performed by: INTERNAL MEDICINE

## 2019-08-06 NOTE — PROGRESS NOTES
Patient MRN: [id number]  Patient : 1945  PCP: Madi Crews MD    CC:   Chief Complaint   Patient presents with    Abnormal ECG     ER fu 19        History of Present Illness:   Jocelyne Duncan is a 74 y.o. y.o. female who presents for consultation for an abnormal EKG.     This is a new patient for me presenting with an established problem of abnormal EKG. She presented to the ED on  and again on 8/3. Initial presentation included dizziness which prompted an EKG. Her EKG showed NSR with PACs, HR 96 with normal intervals. Her PACs are a known condition and has had prior evaluation at [facility] in the past. A prior Holter monitor in  confirmed NSR, PACs, no AF or AFL. Overall she is not dizzy and attributes her one day of dizziness to her GI illness and recent diagnosis of infectious colitis for which she is being treated with Cipro/Flagyl. No prior ECHO or angiogram per her report. Denies shortness of breath, syncope or weakness. She recently had back surgery. She is ambulates and performs her activities of daily living without difficulties but is not yet exercising, limited by joint pain from rheumatoid arthritis.     This is the extent of the patient's complaints at this time. Patient queried and denies any further complaint.     Past Medical History:     Past Medical History:   Diagnosis Date    Arthritis     GERD (gastroesophageal reflux disease)     HTN (hypertension)     Premature atrial complexes     Rheumatoid arthritis        Past Surgical History:     Past Surgical History:   Procedure Laterality Date    LUMBAR SPINE SURGERY         Social History:     Social History     Tobacco Use    Smoking status: Never Smoker    Smokeless tobacco: Never Used   Substance Use Topics    Alcohol use: Not Currently     Frequency: Never       Family History:   History reviewed. No pertinent family history.    Allergies:     Review of patient's allergies indicates:   Allergen Reactions    Adhesive  Itching    Iodine      Swelling (throat)^  Swelling (throat)^      Penicillins        Review of Systems:   Review of Systems   Constitutional: Negative for chills, fever and weight loss.   HENT: Negative for hearing loss and sore throat.    Eyes: Negative for blurred vision.   Respiratory: Negative for cough and shortness of breath.    Cardiovascular: Positive for palpitations. Negative for chest pain, orthopnea, leg swelling and PND.   Gastrointestinal: Negative for abdominal pain, constipation, diarrhea, heartburn, nausea and vomiting.   Genitourinary: Negative for dysuria.   Musculoskeletal: Negative for neck pain.   Skin: Negative for rash.   Neurological: Negative for dizziness and headaches.   Endo/Heme/Allergies: Does not bruise/bleed easily.   Psychiatric/Behavioral: Negative for substance abuse.       All other systems reviewed and are negative.   Medications:     Current Outpatient Medications on File Prior to Visit   Medication Sig Dispense Refill    ciprofloxacin HCl (CIPRO) 500 MG tablet Take 1 tablet (500 mg total) by mouth every 12 (twelve) hours. for 10 days 20 tablet 0    meclizine (ANTIVERT) 12.5 mg tablet Take 1 tablet (12.5 mg total) by mouth 3 (three) times daily as needed for Dizziness. 21 tablet 0    metroNIDAZOLE (FLAGYL) 500 MG tablet Take 1 tablet (500 mg total) by mouth every 8 (eight) hours. for 10 days 30 tablet 0    Al hyd-Mg tr-alg ac-sod bicarb 80-20 mg Chew Take 1 tablet by mouth once daily.       amLODIPine (NORVASC) 5 MG tablet Take 5 mg by mouth once daily.       atorvastatin (LIPITOR) 20 MG tablet Take 20 mg by mouth once daily.       diclofenac sodium (VOLTAREN) 1 % Gel Apply small amount to affected area q 8 hr PRN pain      lisinopril-hydrochlorothiazide (PRINZIDE,ZESTORETIC) 20-12.5 mg per tablet Take 1 tablet by mouth once daily.       medical supply, miscellaneous (MISCELLANEOUS MEDICAL SUPPLY MISC) 1 pair of knee high compression stockings      methotrexate 2.5  "MG Tab every 7 days.       predniSONE (DELTASONE) 1 MG tablet once daily.       predniSONE (DELTASONE) 2.5 MG tablet once daily.        No current facility-administered medications on file prior to visit.        Physical Exam:   BP (!) 115/58 (BP Location: Left arm, Patient Position: Sitting, BP Method: Large (Automatic))   Pulse 77   Ht 5' 2" (1.575 m)   Wt 69.2 kg (152 lb 8.9 oz)   BMI 27.90 kg/m²   Physical Exam   Constitutional: She is oriented to person, place, and time and well-developed, well-nourished, and in no distress.   HENT:   Head: Normocephalic and atraumatic.   Eyes: Conjunctivae are normal.   Neck: Normal range of motion. Neck supple.   Cardiovascular: Normal rate, regular rhythm and normal heart sounds. Exam reveals no gallop and no friction rub.   No murmur heard.  Pulmonary/Chest: Effort normal and breath sounds normal. No respiratory distress. She has no wheezes. She has no rales. She exhibits no tenderness.   Abdominal: Soft. Bowel sounds are normal. She exhibits no distension and no mass. There is no tenderness. There is no rebound and no guarding.   Musculoskeletal: Normal range of motion. She exhibits no edema.   Neurological: She is alert and oriented to person, place, and time.   Skin: Skin is warm and dry.   Psychiatric: Mood normal.   Vitals reviewed.      Labs:     Lab Results   Component Value Date    WBC 12.70 08/03/2019    HGB 12.6 08/03/2019    HCT 38 08/03/2019     08/03/2019    GRAN 9.7 (H) 08/03/2019    GRAN 76.4 (H) 08/03/2019      @LABRCNTIP(NA,K,CO2,CL,BUN,Creatinine,GLU,Calcium,Magnesium, Phosphorus,ALT,AST,Alkphos,Bilitot,Prot,Albumin)@   No results found for: HGBA1C  No results found for: BNP, BNPTRIAGEBLO  No results found for: CHOL, HDL, LDLCALC, TRIG  @LABRCNTIP(aptt,inr,ptt)@     I have reviewed all pertinent labs within the past 24 hours.    Cardiovascular Imaging:   EKG: NSR with PACs    2D echo with color flow doppler: No results found for this or any " previous visit. and Transthoracic echo (TTE) complete (Cupid Only): No results found for this or any previous visit.        Assessment & Plan:     Premature atrial complexes  - Known prior history of PACs  - Holter monitor confirmed PACs in 2012- [facility]  - EKG reviewed  - Intermittently feels palpitations but is otherwise asymptomatic    -No medication changes at this time   - Follow up PRN.     Patient was discussed with Dr. Merida who agrees with the assessment and plan as above.    Roby Rubi - Pager# [phone]  8/6/2019  1:47 PM  Cardiovascular Fellow  [facility]

## 2020-11-09 ENCOUNTER — OFFICE VISIT (OUTPATIENT)
Dept: CARDIOLOGY | Facility: CLINIC | Age: 75
End: 2020-11-09
Payer: MEDICARE

## 2020-11-09 VITALS
SYSTOLIC BLOOD PRESSURE: 124 MMHG | OXYGEN SATURATION: 96 % | DIASTOLIC BLOOD PRESSURE: 62 MMHG | WEIGHT: 161.63 LBS | HEIGHT: 62 IN | BODY MASS INDEX: 29.74 KG/M2 | HEART RATE: 69 BPM

## 2020-11-09 DIAGNOSIS — I49.1 PREMATURE ATRIAL COMPLEXES: Primary | ICD-10-CM

## 2020-11-09 DIAGNOSIS — E78.2 MIXED HYPERLIPIDEMIA: ICD-10-CM

## 2020-11-09 DIAGNOSIS — R06.02 SHORTNESS OF BREATH: ICD-10-CM

## 2020-11-09 DIAGNOSIS — I10 ESSENTIAL HYPERTENSION: ICD-10-CM

## 2020-11-09 DIAGNOSIS — M06.9 RHEUMATOID ARTHRITIS, INVOLVING UNSPECIFIED SITE, UNSPECIFIED WHETHER RHEUMATOID FACTOR PRESENT: ICD-10-CM

## 2020-11-09 PROCEDURE — 99214 PR OFFICE/OUTPT VISIT, EST, LEVL IV, 30-39 MIN: ICD-10-PCS | Mod: S$GLB,,, | Performed by: INTERNAL MEDICINE

## 2020-11-09 PROCEDURE — 1126F PR PAIN SEVERITY QUANTIFIED, NO PAIN PRESENT: ICD-10-PCS | Mod: S$GLB,,, | Performed by: INTERNAL MEDICINE

## 2020-11-09 PROCEDURE — 1159F PR MEDICATION LIST DOCUMENTED IN MEDICAL RECORD: ICD-10-PCS | Mod: S$GLB,,, | Performed by: INTERNAL MEDICINE

## 2020-11-09 PROCEDURE — 99999 PR PBB SHADOW E&M-EST. PATIENT-LVL IV: ICD-10-PCS | Mod: PBBFAC,,, | Performed by: INTERNAL MEDICINE

## 2020-11-09 PROCEDURE — 1126F AMNT PAIN NOTED NONE PRSNT: CPT | Mod: S$GLB,,, | Performed by: INTERNAL MEDICINE

## 2020-11-09 PROCEDURE — 99214 OFFICE O/P EST MOD 30 MIN: CPT | Mod: S$GLB,,, | Performed by: INTERNAL MEDICINE

## 2020-11-09 PROCEDURE — 1159F MED LIST DOCD IN RCRD: CPT | Mod: S$GLB,,, | Performed by: INTERNAL MEDICINE

## 2020-11-09 PROCEDURE — 99999 PR PBB SHADOW E&M-EST. PATIENT-LVL IV: CPT | Mod: PBBFAC,,, | Performed by: INTERNAL MEDICINE

## 2020-11-09 PROCEDURE — 93005 EKG 12-LEAD: ICD-10-PCS | Mod: S$GLB,,, | Performed by: INTERNAL MEDICINE

## 2020-11-09 PROCEDURE — 3008F PR BODY MASS INDEX (BMI) DOCUMENTED: ICD-10-PCS | Mod: CPTII,S$GLB,, | Performed by: INTERNAL MEDICINE

## 2020-11-09 PROCEDURE — 93005 ELECTROCARDIOGRAM TRACING: CPT | Mod: S$GLB,,, | Performed by: INTERNAL MEDICINE

## 2020-11-09 PROCEDURE — 93010 EKG 12-LEAD: ICD-10-PCS | Mod: S$GLB,,, | Performed by: INTERNAL MEDICINE

## 2020-11-09 PROCEDURE — 1101F PT FALLS ASSESS-DOCD LE1/YR: CPT | Mod: CPTII,S$GLB,, | Performed by: INTERNAL MEDICINE

## 2020-11-09 PROCEDURE — 3008F BODY MASS INDEX DOCD: CPT | Mod: CPTII,S$GLB,, | Performed by: INTERNAL MEDICINE

## 2020-11-09 PROCEDURE — 93010 ELECTROCARDIOGRAM REPORT: CPT | Mod: S$GLB,,, | Performed by: INTERNAL MEDICINE

## 2020-11-09 PROCEDURE — 1101F PR PT FALLS ASSESS DOC 0-1 FALLS W/OUT INJ PAST YR: ICD-10-PCS | Mod: CPTII,S$GLB,, | Performed by: INTERNAL MEDICINE

## 2020-11-09 RX ORDER — ESOMEPRAZOLE MAGNESIUM 40 MG/1
40 CAPSULE, DELAYED RELEASE ORAL DAILY
COMMUNITY
Start: 2020-11-04

## 2020-11-09 RX ORDER — NYSTATIN 100000 U/G
CREAM TOPICAL
COMMUNITY
Start: 2020-08-10

## 2020-11-09 RX ORDER — FUROSEMIDE 20 MG/1
20 TABLET ORAL EVERY OTHER DAY
COMMUNITY
Start: 2020-11-03

## 2020-11-09 RX ORDER — FLUTICASONE FUROATE AND VILANTEROL 100; 25 UG/1; UG/1
1 POWDER RESPIRATORY (INHALATION) DAILY
COMMUNITY
End: 2021-05-24 | Stop reason: SDUPTHER

## 2020-11-09 RX ORDER — DICLOFENAC SODIUM 75 MG/1
75 TABLET, DELAYED RELEASE ORAL 2 TIMES DAILY
COMMUNITY
End: 2023-10-22

## 2020-11-09 RX ORDER — FOLIC ACID 0.8 MG
800 TABLET ORAL DAILY
COMMUNITY
End: 2023-10-22

## 2020-11-09 NOTE — LETTER
November 9, 2020      ANCELMO Weston  [address] [place] [address]           [facility] [address]  [place] [address]  Phone: [phone]          Patient: Jocelyne Duncan   MR Number: [id number]   YOB: 1945   Date of Visit: 11/9/2020       Dear Natali Barboza:    Thank you for referring Jocelyne Duncan to me for evaluation. Attached you will find relevant portions of my assessment and plan of care.    If you have questions, please do not hesitate to call me. I look forward to following Jocelyne Duncan along with you.    Sincerely,    Parish Martell MD    Enclosure  CC:  No Recipients    If you would like to receive this communication electronically, please contact [email or web address] or [phone] to request more information on [facility] access.    For providers and/or their staff who would like to refer a patient to [facility], please contact us through our one-stop-shop provider referral line, Frandy Hernandez, at [phone].    If you feel you have received this communication in error or would no longer like to receive these types of communications, please e-mail [email or web address]

## 2020-11-09 NOTE — PROGRESS NOTES
Subjective:   Chief Complaint: Shortness of Breath  Last Clinic Visit: New Patient    History of Present Illness: Jocelyne Duncan is a 75 y.o. lady [place]-speaking from [place], but speaking English fluently, with hypertension, hyperlipidemia, rheumatoid arthritis on DMARD/steroid, in PACs, who presents to follow-up with cardiology.  She recently saw her PCP who noted irregular heartbeats, and referred to Cardiology.  She has a history of PACs longstanding, extensive workup at outside hospital, without any evidence of AFib a flutter, and no evidence of congestive heart failure or underlying structural heart disease.  She saw Cardiology here last year for similar issues.  She denies any change in her palpitations, irregular, at rest, no associated chest pain or shortness of breath with palpitations, typically happen once a week, and resolved spontaneously.  No sustained periods of tachycardia, no focal neurological deficits.  She has a new complaint of worsening shortness of breath over the course the past year PCP is also ordered a chest x-ray, occasional wheezing, no clear orthopnea, no clear weight gain, no PND.  She does however have some difficulty with sleeping.  She reports last echocardiogram was many years ago, unclear if was done at outside facility.   is from [place], he also would like to be referred to us for issues it sounds like with atrial fibrillation and DVTs.  She is not currently on Lasix.  Questionable diagnosis of COPD.  Palpitations are not severe enough to where she would want any medication or procedural therapy for them.    Dx:  Hypertension  Hyperlipidemia  Osteoarthritis  Rheumatoid arthritis on DMARD/steroid  PACs    Past medical and surgical history reviewed as documented.    Review of Systems   Constitution: Negative.   HENT: Negative.    Eyes: Negative.    Cardiovascular: Positive for dyspnea on exertion and palpitations.   Respiratory: Positive for shortness of breath and  wheezing.    Hematologic/Lymphatic: Negative.    Skin: Negative.    Musculoskeletal: Negative.    Gastrointestinal: Negative.    Genitourinary: Negative.      Medications:  Outpatient Encounter Medications as of 11/9/2020   Medication Sig Dispense Refill    Al hyd-Mg tr-alg ac-sod bicarb 80-20 mg Chew Take 1 tablet by mouth once daily.       amLODIPine (NORVASC) 5 MG tablet Take 5 mg by mouth once daily.       atorvastatin (LIPITOR) 20 MG tablet Take 20 mg by mouth once daily.       diclofenac (VOLTAREN) 75 MG EC tablet Take 75 mg by mouth 2 (two) times daily.      diclofenac sodium (VOLTAREN) 1 % Gel Apply small amount to affected area q 8 hr PRN pain      esomeprazole (NEXIUM) 40 MG capsule       fluticasone furoate-vilanteroL (BREO) 100-25 mcg/dose diskus inhaler Inhale 1 puff into the lungs once daily. Controller      folic acid (FOLVITE) 800 MCG Tab Take 800 mcg by mouth once daily.      furosemide (LASIX) 20 MG tablet       lisinopril-hydrochlorothiazide (PRINZIDE,ZESTORETIC) 20-12.5 mg per tablet Take 1 tablet by mouth once daily.       medical supply, miscellaneous (MISCELLANEOUS MEDICAL SUPPLY MISC) 1 pair of knee high compression stockings      methotrexate 2.5 MG Tab every 7 days.       nystatin (MYCOSTATIN) cream       predniSONE (DELTASONE) 1 MG tablet once daily.       vitamin D3-folic acid 5,000 unit- 1 mg Tab Take 1 tablet by mouth once daily.      [DISCONTINUED] meclizine (ANTIVERT) 12.5 mg tablet Take 1 tablet (12.5 mg total) by mouth 3 (three) times daily as needed for Dizziness. 21 tablet 0    [DISCONTINUED] predniSONE (DELTASONE) 2.5 MG tablet once daily.        No facility-administered encounter medications on file as of 11/9/2020.      Family History:  Denies any family history of premature atherosclerosis  Social History:  Jocelyne reports that she has never smoked. She has never used smokeless tobacco. She reports previous alcohol use.    Objective:   /62   Pulse 69   Ht  "5' 2" (1.575 m)   Wt 73.3 kg (161 lb 9.6 oz)   SpO2 96%   BMI 29.56 kg/m²     Physical Exam   Constitutional: She is oriented to person, place, and time and well-developed, well-nourished, and in no distress. No distress.   HENT:   Head: Normocephalic and atraumatic.   Mouth/Throat: No oropharyngeal exudate.   Eyes: EOM are normal. No scleral icterus.   Neck: No JVD present. No tracheal deviation present. No thyromegaly present.   Cardiovascular: Normal rate. Exam reveals no gallop and no friction rub.   Murmur (One/6 systolic) heard.  Occasional ectopic beat, otherwise regular R-R interval.   Pulmonary/Chest: Effort normal and breath sounds normal. No respiratory distress. She has no wheezes. She has no rales. She exhibits no tenderness.   Abdominal: Soft. She exhibits no distension. There is no abdominal tenderness. There is no rebound and no guarding.   Musculoskeletal: Normal range of motion.         General: No edema.   Neurological: She is alert and oriented to person, place, and time.   Skin: Skin is warm and dry. She is not diaphoretic. No erythema.   Psychiatric: Affect normal.     EKG:  My independent visualization of most recent EKG is normal sinus rhythm with PACs    TTE:  Pending  Lipids:       Renal:  Recent Labs   Lab 08/03/19  0800   Creatinine 0.8   Potassium 4.0   CO2 20 L   BUN 23     Liver:  Recent Labs   Lab 08/03/19  0800   AST 18   ALT 17     Assessment:     1. Premature atrial complexes    2. Shortness of breath    3. Rheumatoid arthritis, involving unspecified site, unspecified whether rheumatoid factor present    4. Essential hypertension    5. Mixed hyperlipidemia      Plan:   1. Premature atrial complexes  PACs on rhythm strip today similar to prior rhythm strips with PACs, palpitations unchanged, issue very stable, she  not interested in any medical therapy or procedural therapy for them at this time.  - IN OFFICE EKG 12-LEAD (to Muse)  - Echo Color Flow Doppler? Yes; Future    2. " Shortness of breath  Shortness of breath is new, differential includes deconditioning, versus congestive heart failure versus ischemia versus pulmonary disease with rheumatoid arthritis.  Will start with working of congestive heart failure evaluate echocardiogram.  - IN OFFICE EKG 12-LEAD (to Muse)  - Echo Color Flow Doppler? Yes; Future    3. Rheumatoid arthritis, involving unspecified site, unspecified whether rheumatoid factor present      4. Essential hypertension      5. Mixed hyperlipidemia      Follow up in 6 months      Parish Martell MD FACC

## 2020-12-11 ENCOUNTER — HOSPITAL ENCOUNTER (OUTPATIENT)
Dept: CARDIOLOGY | Facility: HOSPITAL | Age: 75
Discharge: HOME | End: 2020-12-11
Attending: INTERNAL MEDICINE
Payer: MEDICARE

## 2020-12-11 DIAGNOSIS — R06.02 SHORTNESS OF BREATH: ICD-10-CM

## 2020-12-11 DIAGNOSIS — I49.1 PREMATURE ATRIAL COMPLEXES: ICD-10-CM

## 2020-12-11 LAB
ASCENDING AORTA: 2.8 CM
AV INDEX (PROSTH): 0.68
AV MEAN GRADIENT: 7 MMHG
AV PEAK GRADIENT: 15 MMHG
AV VALVE AREA: 2.15 CM2
AV VELOCITY RATIO: 0.62
CV ECHO LV RWT: 0.31 CM
DOP CALC AO PEAK VEL: 1.93 M/S
DOP CALC AO VTI: 39.49 CM
DOP CALC LVOT AREA: 3.1 CM2
DOP CALC LVOT DIAMETER: 2 CM
DOP CALC LVOT PEAK VEL: 1.2 M/S
DOP CALC LVOT STROKE VOLUME: 84.84 CM3
DOP CALC RVOT PEAK VEL: 0.86 M/S
DOP CALC RVOT VTI: 20.44 CM
DOP CALCLVOT PEAK VEL VTI: 27.02 CM
E WAVE DECELERATION TIME: 216.78 MSEC
E/A RATIO: 0.77
E/E' RATIO: 16.44 M/S
ECHO LV POSTERIOR WALL: 0.71 CM (ref 0.6–1.1)
FRACTIONAL SHORTENING: 49 % (ref 28–44)
INTERVENTRICULAR SEPTUM: 0.8 CM (ref 0.6–1.1)
IVRT: 112.75 MSEC
LA MAJOR: 4.92 CM
LA MINOR: 4.9 CM
LA WIDTH: 3.41 CM
LEFT ATRIUM SIZE: 3.92 CM
LEFT ATRIUM VOLUME MOD: 43.23 CM3
LEFT ATRIUM VOLUME: 55.79 CM3
LEFT INTERNAL DIMENSION IN SYSTOLE: 2.36 CM (ref 2.1–4)
LEFT VENTRICLE DIASTOLIC VOLUME: 87.07 ML
LEFT VENTRICLE SYSTOLIC VOLUME: 19.37 ML
LEFT VENTRICULAR INTERNAL DIMENSION IN DIASTOLE: 4.6 CM (ref 3.5–6)
LEFT VENTRICULAR MASS: 109.39 G
LV LATERAL E/E' RATIO: 14.8 M/S
LV SEPTAL E/E' RATIO: 18.5 M/S
MV A" WAVE DURATION": 11.13 MSEC
MV PEAK A VEL: 0.96 M/S
MV PEAK E VEL: 0.74 M/S
PISA TR MAX VEL: 3.01 M/S
PULM VEIN S/D RATIO: 1.74
PV MEAN GRADIENT: 1.82 MMHG
PV PEAK D VEL: 0.27 M/S
PV PEAK S VEL: 0.47 M/S
PV PEAK VELOCITY: 1.53 CM/S
RA MAJOR: 4.4 CM
RA PRESSURE: 3 MMHG
RA WIDTH: 2.72 CM
RIGHT VENTRICULAR END-DIASTOLIC DIMENSION: 2.56 CM
RV TISSUE DOPPLER FREE WALL SYSTOLIC VELOCITY 1 (APICAL 4 CHAMBER VIEW): 11.17 CM/S
SINUS: 2.61 CM
STJ: 2.36 CM
TDI LATERAL: 0.05 M/S
TDI SEPTAL: 0.04 M/S
TDI: 0.05 M/S
TR MAX PG: 36 MMHG
TRICUSPID ANNULAR PLANE SYSTOLIC EXCURSION: 1.86 CM
TV REST PULMONARY ARTERY PRESSURE: 39 MMHG

## 2020-12-11 PROCEDURE — 93306 TTE W/DOPPLER COMPLETE: CPT

## 2020-12-11 PROCEDURE — 93306 ECHO (CUPID ONLY): ICD-10-PCS | Mod: 26,,, | Performed by: INTERNAL MEDICINE

## 2020-12-11 PROCEDURE — 93306 TTE W/DOPPLER COMPLETE: CPT | Mod: 26,,, | Performed by: INTERNAL MEDICINE

## 2020-12-17 ENCOUNTER — TELEPHONE (OUTPATIENT)
Dept: CARDIOLOGY | Facility: CLINIC | Age: 75
End: 2020-12-17

## 2020-12-17 DIAGNOSIS — R06.02 SHORTNESS OF BREATH: ICD-10-CM

## 2020-12-17 DIAGNOSIS — M06.9 RHEUMATOID ARTHRITIS, INVOLVING UNSPECIFIED SITE, UNSPECIFIED WHETHER RHEUMATOID FACTOR PRESENT: ICD-10-CM

## 2020-12-17 DIAGNOSIS — I49.1 PREMATURE ATRIAL COMPLEXES: Primary | ICD-10-CM

## 2020-12-17 RX ORDER — PROPRANOLOL HYDROCHLORIDE 20 MG/1
20 TABLET ORAL NIGHTLY PRN
Qty: 60 TABLET | Refills: 3 | Status: SHIPPED | OUTPATIENT
Start: 2020-12-17 | End: 2021-05-24

## 2020-12-17 NOTE — TELEPHONE ENCOUNTER
Called patient, explained echocardiogram within normal limits, normal ejection fraction, murmur likely related to aortic valve sclerosis without significant stenosis.  No indication for intervention at this time, nothing to clearly explained shortness of breath.    She still complains of palpitations at night, likely PACs, but now would be open to medication.    Prescribed propranolol as needed at night for sleep with palpitations.    Also obtain PFTs with history rheumatoid arthritis and shortness of breath.    -Parish Martell

## 2021-05-24 ENCOUNTER — OFFICE VISIT (OUTPATIENT)
Dept: CARDIOLOGY | Facility: CLINIC | Age: 76
End: 2021-05-24
Payer: MEDICARE

## 2021-05-24 VITALS
HEIGHT: 62 IN | BODY MASS INDEX: 32.17 KG/M2 | WEIGHT: 174.81 LBS | HEART RATE: 65 BPM | SYSTOLIC BLOOD PRESSURE: 130 MMHG | DIASTOLIC BLOOD PRESSURE: 60 MMHG

## 2021-05-24 DIAGNOSIS — I10 ESSENTIAL HYPERTENSION: ICD-10-CM

## 2021-05-24 DIAGNOSIS — R06.02 SHORTNESS OF BREATH: Primary | ICD-10-CM

## 2021-05-24 DIAGNOSIS — I49.1 PREMATURE ATRIAL COMPLEXES: ICD-10-CM

## 2021-05-24 PROCEDURE — 99999 PR PBB SHADOW E&M-EST. PATIENT-LVL III: ICD-10-PCS | Mod: PBBFAC,,, | Performed by: INTERNAL MEDICINE

## 2021-05-24 PROCEDURE — 99999 PR PBB SHADOW E&M-EST. PATIENT-LVL III: CPT | Mod: PBBFAC,,, | Performed by: INTERNAL MEDICINE

## 2021-05-24 PROCEDURE — 1159F MED LIST DOCD IN RCRD: CPT | Mod: S$GLB,,, | Performed by: INTERNAL MEDICINE

## 2021-05-24 PROCEDURE — 1125F AMNT PAIN NOTED PAIN PRSNT: CPT | Mod: S$GLB,,, | Performed by: INTERNAL MEDICINE

## 2021-05-24 PROCEDURE — 3288F FALL RISK ASSESSMENT DOCD: CPT | Mod: CPTII,S$GLB,, | Performed by: INTERNAL MEDICINE

## 2021-05-24 PROCEDURE — 1125F PR PAIN SEVERITY QUANTIFIED, PAIN PRESENT: ICD-10-PCS | Mod: S$GLB,,, | Performed by: INTERNAL MEDICINE

## 2021-05-24 PROCEDURE — 3288F PR FALLS RISK ASSESSMENT DOCUMENTED: ICD-10-PCS | Mod: CPTII,S$GLB,, | Performed by: INTERNAL MEDICINE

## 2021-05-24 PROCEDURE — 99213 OFFICE O/P EST LOW 20 MIN: CPT | Mod: S$GLB,,, | Performed by: INTERNAL MEDICINE

## 2021-05-24 PROCEDURE — 99213 PR OFFICE/OUTPT VISIT, EST, LEVL III, 20-29 MIN: ICD-10-PCS | Mod: S$GLB,,, | Performed by: INTERNAL MEDICINE

## 2021-05-24 PROCEDURE — 1101F PR PT FALLS ASSESS DOC 0-1 FALLS W/OUT INJ PAST YR: ICD-10-PCS | Mod: CPTII,S$GLB,, | Performed by: INTERNAL MEDICINE

## 2021-05-24 PROCEDURE — 1101F PT FALLS ASSESS-DOCD LE1/YR: CPT | Mod: CPTII,S$GLB,, | Performed by: INTERNAL MEDICINE

## 2021-05-24 PROCEDURE — 1159F PR MEDICATION LIST DOCUMENTED IN MEDICAL RECORD: ICD-10-PCS | Mod: S$GLB,,, | Performed by: INTERNAL MEDICINE

## 2021-05-24 RX ORDER — FLUTICASONE FUROATE AND VILANTEROL 100; 25 UG/1; UG/1
1 POWDER RESPIRATORY (INHALATION) DAILY
Qty: 90 EACH | Refills: 3 | Status: SHIPPED | OUTPATIENT
Start: 2021-05-24 | End: 2021-10-26

## 2021-05-24 RX ORDER — GABAPENTIN 300 MG/1
300 CAPSULE ORAL 3 TIMES DAILY
COMMUNITY
Start: 2021-04-13 | End: 2022-02-28

## 2021-07-22 ENCOUNTER — LAB VISIT (OUTPATIENT)
Dept: LAB | Facility: OTHER | Age: 76
End: 2021-07-22
Payer: MEDICARE

## 2021-07-22 ENCOUNTER — OFFICE VISIT (OUTPATIENT)
Dept: OBSTETRICS AND GYNECOLOGY | Facility: CLINIC | Age: 76
End: 2021-07-22
Payer: MEDICARE

## 2021-07-22 VITALS
SYSTOLIC BLOOD PRESSURE: 140 MMHG | WEIGHT: 173.75 LBS | BODY MASS INDEX: 31.77 KG/M2 | DIASTOLIC BLOOD PRESSURE: 90 MMHG

## 2021-07-22 DIAGNOSIS — N89.8 VAGINAL LESION: ICD-10-CM

## 2021-07-22 DIAGNOSIS — N89.8 VAGINAL LESION: Primary | ICD-10-CM

## 2021-07-22 PROCEDURE — 99203 PR OFFICE/OUTPT VISIT, NEW, LEVL III, 30-44 MIN: ICD-10-PCS | Mod: S$GLB,,, | Performed by: PHYSICIAN ASSISTANT

## 2021-07-22 PROCEDURE — 3077F PR MOST RECENT SYSTOLIC BLOOD PRESSURE >= 140 MM HG: ICD-10-PCS | Mod: CPTII,S$GLB,, | Performed by: PHYSICIAN ASSISTANT

## 2021-07-22 PROCEDURE — 86694 HERPES SIMPLEX NES ANTBDY: CPT | Performed by: PHYSICIAN ASSISTANT

## 2021-07-22 PROCEDURE — 1125F PR PAIN SEVERITY QUANTIFIED, PAIN PRESENT: ICD-10-PCS | Mod: CPTII,S$GLB,, | Performed by: PHYSICIAN ASSISTANT

## 2021-07-22 PROCEDURE — 3080F PR MOST RECENT DIASTOLIC BLOOD PRESSURE >= 90 MM HG: ICD-10-PCS | Mod: CPTII,S$GLB,, | Performed by: PHYSICIAN ASSISTANT

## 2021-07-22 PROCEDURE — 1159F MED LIST DOCD IN RCRD: CPT | Mod: CPTII,S$GLB,, | Performed by: PHYSICIAN ASSISTANT

## 2021-07-22 PROCEDURE — 3077F SYST BP >= 140 MM HG: CPT | Mod: CPTII,S$GLB,, | Performed by: PHYSICIAN ASSISTANT

## 2021-07-22 PROCEDURE — 3080F DIAST BP >= 90 MM HG: CPT | Mod: CPTII,S$GLB,, | Performed by: PHYSICIAN ASSISTANT

## 2021-07-22 PROCEDURE — 99999 PR PBB SHADOW E&M-EST. PATIENT-LVL III: ICD-10-PCS | Mod: PBBFAC,,, | Performed by: PHYSICIAN ASSISTANT

## 2021-07-22 PROCEDURE — 99203 OFFICE O/P NEW LOW 30 MIN: CPT | Mod: S$GLB,,, | Performed by: PHYSICIAN ASSISTANT

## 2021-07-22 PROCEDURE — 1101F PR PT FALLS ASSESS DOC 0-1 FALLS W/OUT INJ PAST YR: ICD-10-PCS | Mod: CPTII,S$GLB,, | Performed by: PHYSICIAN ASSISTANT

## 2021-07-22 PROCEDURE — 87529 HSV DNA AMP PROBE: CPT | Mod: 59 | Performed by: PHYSICIAN ASSISTANT

## 2021-07-22 PROCEDURE — 87529 HSV DNA AMP PROBE: CPT | Performed by: PHYSICIAN ASSISTANT

## 2021-07-22 PROCEDURE — 1101F PT FALLS ASSESS-DOCD LE1/YR: CPT | Mod: CPTII,S$GLB,, | Performed by: PHYSICIAN ASSISTANT

## 2021-07-22 PROCEDURE — 3288F FALL RISK ASSESSMENT DOCD: CPT | Mod: CPTII,S$GLB,, | Performed by: PHYSICIAN ASSISTANT

## 2021-07-22 PROCEDURE — 99999 PR PBB SHADOW E&M-EST. PATIENT-LVL III: CPT | Mod: PBBFAC,,, | Performed by: PHYSICIAN ASSISTANT

## 2021-07-22 PROCEDURE — 1159F PR MEDICATION LIST DOCUMENTED IN MEDICAL RECORD: ICD-10-PCS | Mod: CPTII,S$GLB,, | Performed by: PHYSICIAN ASSISTANT

## 2021-07-22 PROCEDURE — 1125F AMNT PAIN NOTED PAIN PRSNT: CPT | Mod: CPTII,S$GLB,, | Performed by: PHYSICIAN ASSISTANT

## 2021-07-22 PROCEDURE — 3288F PR FALLS RISK ASSESSMENT DOCUMENTED: ICD-10-PCS | Mod: CPTII,S$GLB,, | Performed by: PHYSICIAN ASSISTANT

## 2021-07-24 LAB
HSV-1 DNA BY PCR: NEGATIVE
HSV-2 DNA BY PCR: NEGATIVE

## 2021-07-26 LAB — HSV AB, IGM BY EIA: 0.17 INDEX

## 2021-07-27 LAB
HSV1 DNA SPEC QL NAA+PROBE: NEGATIVE
HSV2 DNA SPEC QL NAA+PROBE: NEGATIVE
SPECIMEN SOURCE: NORMAL

## 2021-09-21 ENCOUNTER — TELEPHONE (OUTPATIENT)
Dept: NEUROSURGERY | Facility: CLINIC | Age: 76
End: 2021-09-21

## 2021-09-23 ENCOUNTER — TELEPHONE (OUTPATIENT)
Dept: NEUROSURGERY | Facility: CLINIC | Age: 76
End: 2021-09-23

## 2021-09-29 ENCOUNTER — OFFICE VISIT (OUTPATIENT)
Dept: NEUROSURGERY | Facility: CLINIC | Age: 76
End: 2021-09-29
Payer: MEDICARE

## 2021-09-29 VITALS
HEART RATE: 78 BPM | BODY MASS INDEX: 31.09 KG/M2 | WEIGHT: 170 LBS | DIASTOLIC BLOOD PRESSURE: 70 MMHG | SYSTOLIC BLOOD PRESSURE: 171 MMHG

## 2021-09-29 DIAGNOSIS — M48.062 SPINAL STENOSIS OF LUMBAR REGION WITH NEUROGENIC CLAUDICATION: ICD-10-CM

## 2021-09-29 DIAGNOSIS — M47.816 LUMBAR SPONDYLOSIS: Primary | ICD-10-CM

## 2021-09-29 PROCEDURE — 1159F PR MEDICATION LIST DOCUMENTED IN MEDICAL RECORD: ICD-10-PCS | Mod: CPTII,S$GLB,, | Performed by: NEUROLOGICAL SURGERY

## 2021-09-29 PROCEDURE — 4010F ACE/ARB THERAPY RXD/TAKEN: CPT | Mod: CPTII,S$GLB,, | Performed by: NEUROLOGICAL SURGERY

## 2021-09-29 PROCEDURE — 99999 PR PBB SHADOW E&M-EST. PATIENT-LVL III: CPT | Mod: PBBFAC,,, | Performed by: NEUROLOGICAL SURGERY

## 2021-09-29 PROCEDURE — 3078F PR MOST RECENT DIASTOLIC BLOOD PRESSURE < 80 MM HG: ICD-10-PCS | Mod: CPTII,S$GLB,, | Performed by: NEUROLOGICAL SURGERY

## 2021-09-29 PROCEDURE — 99999 PR PBB SHADOW E&M-EST. PATIENT-LVL III: ICD-10-PCS | Mod: PBBFAC,,, | Performed by: NEUROLOGICAL SURGERY

## 2021-09-29 PROCEDURE — 99205 OFFICE O/P NEW HI 60 MIN: CPT | Mod: S$GLB,,, | Performed by: NEUROLOGICAL SURGERY

## 2021-09-29 PROCEDURE — 4010F PR ACE/ARB THEARPY RXD/TAKEN: ICD-10-PCS | Mod: CPTII,S$GLB,, | Performed by: NEUROLOGICAL SURGERY

## 2021-09-29 PROCEDURE — 99205 PR OFFICE/OUTPT VISIT, NEW, LEVL V, 60-74 MIN: ICD-10-PCS | Mod: S$GLB,,, | Performed by: NEUROLOGICAL SURGERY

## 2021-09-29 PROCEDURE — 1125F PR PAIN SEVERITY QUANTIFIED, PAIN PRESENT: ICD-10-PCS | Mod: CPTII,S$GLB,, | Performed by: NEUROLOGICAL SURGERY

## 2021-09-29 PROCEDURE — 1159F MED LIST DOCD IN RCRD: CPT | Mod: CPTII,S$GLB,, | Performed by: NEUROLOGICAL SURGERY

## 2021-09-29 PROCEDURE — 1160F RVW MEDS BY RX/DR IN RCRD: CPT | Mod: CPTII,S$GLB,, | Performed by: NEUROLOGICAL SURGERY

## 2021-09-29 PROCEDURE — 3288F FALL RISK ASSESSMENT DOCD: CPT | Mod: CPTII,S$GLB,, | Performed by: NEUROLOGICAL SURGERY

## 2021-09-29 PROCEDURE — 3077F SYST BP >= 140 MM HG: CPT | Mod: CPTII,S$GLB,, | Performed by: NEUROLOGICAL SURGERY

## 2021-09-29 PROCEDURE — 1101F PT FALLS ASSESS-DOCD LE1/YR: CPT | Mod: CPTII,S$GLB,, | Performed by: NEUROLOGICAL SURGERY

## 2021-09-29 PROCEDURE — 3078F DIAST BP <80 MM HG: CPT | Mod: CPTII,S$GLB,, | Performed by: NEUROLOGICAL SURGERY

## 2021-09-29 PROCEDURE — 1101F PR PT FALLS ASSESS DOC 0-1 FALLS W/OUT INJ PAST YR: ICD-10-PCS | Mod: CPTII,S$GLB,, | Performed by: NEUROLOGICAL SURGERY

## 2021-09-29 PROCEDURE — 1160F PR REVIEW ALL MEDS BY PRESCRIBER/CLIN PHARMACIST DOCUMENTED: ICD-10-PCS | Mod: CPTII,S$GLB,, | Performed by: NEUROLOGICAL SURGERY

## 2021-09-29 PROCEDURE — 3077F PR MOST RECENT SYSTOLIC BLOOD PRESSURE >= 140 MM HG: ICD-10-PCS | Mod: CPTII,S$GLB,, | Performed by: NEUROLOGICAL SURGERY

## 2021-09-29 PROCEDURE — 1125F AMNT PAIN NOTED PAIN PRSNT: CPT | Mod: CPTII,S$GLB,, | Performed by: NEUROLOGICAL SURGERY

## 2021-09-29 PROCEDURE — 3288F PR FALLS RISK ASSESSMENT DOCUMENTED: ICD-10-PCS | Mod: CPTII,S$GLB,, | Performed by: NEUROLOGICAL SURGERY

## 2021-09-29 RX ORDER — METHOCARBAMOL 500 MG/1
500 TABLET, FILM COATED ORAL 3 TIMES DAILY PRN
COMMUNITY
End: 2023-10-22

## 2021-10-04 ENCOUNTER — TELEPHONE (OUTPATIENT)
Dept: NEUROSURGERY | Facility: CLINIC | Age: 76
End: 2021-10-04

## 2021-10-04 DIAGNOSIS — M47.816 LUMBAR SPONDYLOSIS: Primary | ICD-10-CM

## 2021-10-04 DIAGNOSIS — M48.062 SPINAL STENOSIS OF LUMBAR REGION WITH NEUROGENIC CLAUDICATION: ICD-10-CM

## 2022-01-06 ENCOUNTER — OFFICE VISIT (OUTPATIENT)
Dept: OBSTETRICS AND GYNECOLOGY | Facility: CLINIC | Age: 77
End: 2022-01-06
Attending: OBSTETRICS & GYNECOLOGY
Payer: MEDICARE

## 2022-01-06 VITALS
DIASTOLIC BLOOD PRESSURE: 82 MMHG | WEIGHT: 172.38 LBS | BODY MASS INDEX: 31.72 KG/M2 | SYSTOLIC BLOOD PRESSURE: 130 MMHG | HEIGHT: 62 IN

## 2022-01-06 DIAGNOSIS — N76.6 VULVAR ULCERATION: Primary | ICD-10-CM

## 2022-01-06 PROCEDURE — 99213 PR OFFICE/OUTPT VISIT, EST, LEVL III, 20-29 MIN: ICD-10-PCS | Mod: S$GLB,,, | Performed by: OBSTETRICS & GYNECOLOGY

## 2022-01-06 PROCEDURE — 1160F RVW MEDS BY RX/DR IN RCRD: CPT | Mod: CPTII,S$GLB,, | Performed by: OBSTETRICS & GYNECOLOGY

## 2022-01-06 PROCEDURE — 3288F FALL RISK ASSESSMENT DOCD: CPT | Mod: CPTII,S$GLB,, | Performed by: OBSTETRICS & GYNECOLOGY

## 2022-01-06 PROCEDURE — 3079F DIAST BP 80-89 MM HG: CPT | Mod: CPTII,S$GLB,, | Performed by: OBSTETRICS & GYNECOLOGY

## 2022-01-06 PROCEDURE — 3075F PR MOST RECENT SYSTOLIC BLOOD PRESS GE 130-139MM HG: ICD-10-PCS | Mod: CPTII,S$GLB,, | Performed by: OBSTETRICS & GYNECOLOGY

## 2022-01-06 PROCEDURE — 3075F SYST BP GE 130 - 139MM HG: CPT | Mod: CPTII,S$GLB,, | Performed by: OBSTETRICS & GYNECOLOGY

## 2022-01-06 PROCEDURE — 1101F PT FALLS ASSESS-DOCD LE1/YR: CPT | Mod: CPTII,S$GLB,, | Performed by: OBSTETRICS & GYNECOLOGY

## 2022-01-06 PROCEDURE — 1159F PR MEDICATION LIST DOCUMENTED IN MEDICAL RECORD: ICD-10-PCS | Mod: CPTII,S$GLB,, | Performed by: OBSTETRICS & GYNECOLOGY

## 2022-01-06 PROCEDURE — 1126F PR PAIN SEVERITY QUANTIFIED, NO PAIN PRESENT: ICD-10-PCS | Mod: CPTII,S$GLB,, | Performed by: OBSTETRICS & GYNECOLOGY

## 2022-01-06 PROCEDURE — 1160F PR REVIEW ALL MEDS BY PRESCRIBER/CLIN PHARMACIST DOCUMENTED: ICD-10-PCS | Mod: CPTII,S$GLB,, | Performed by: OBSTETRICS & GYNECOLOGY

## 2022-01-06 PROCEDURE — 99999 PR PBB SHADOW E&M-EST. PATIENT-LVL III: ICD-10-PCS | Mod: PBBFAC,,, | Performed by: OBSTETRICS & GYNECOLOGY

## 2022-01-06 PROCEDURE — 1101F PR PT FALLS ASSESS DOC 0-1 FALLS W/OUT INJ PAST YR: ICD-10-PCS | Mod: CPTII,S$GLB,, | Performed by: OBSTETRICS & GYNECOLOGY

## 2022-01-06 PROCEDURE — 1159F MED LIST DOCD IN RCRD: CPT | Mod: CPTII,S$GLB,, | Performed by: OBSTETRICS & GYNECOLOGY

## 2022-01-06 PROCEDURE — 99999 PR PBB SHADOW E&M-EST. PATIENT-LVL III: CPT | Mod: PBBFAC,,, | Performed by: OBSTETRICS & GYNECOLOGY

## 2022-01-06 PROCEDURE — 3288F PR FALLS RISK ASSESSMENT DOCUMENTED: ICD-10-PCS | Mod: CPTII,S$GLB,, | Performed by: OBSTETRICS & GYNECOLOGY

## 2022-01-06 PROCEDURE — 87529 HSV DNA AMP PROBE: CPT | Performed by: OBSTETRICS & GYNECOLOGY

## 2022-01-06 PROCEDURE — 1126F AMNT PAIN NOTED NONE PRSNT: CPT | Mod: CPTII,S$GLB,, | Performed by: OBSTETRICS & GYNECOLOGY

## 2022-01-06 PROCEDURE — 99213 OFFICE O/P EST LOW 20 MIN: CPT | Mod: S$GLB,,, | Performed by: OBSTETRICS & GYNECOLOGY

## 2022-01-06 PROCEDURE — 3079F PR MOST RECENT DIASTOLIC BLOOD PRESSURE 80-89 MM HG: ICD-10-PCS | Mod: CPTII,S$GLB,, | Performed by: OBSTETRICS & GYNECOLOGY

## 2022-01-06 NOTE — PROGRESS NOTES
"Chief Complaint   Patient presents with    Vaginitis       HPI:  Jocelyne Duncan is a 76 y.o. female patient  who presents today for evaluation of recurrent vulva ulcers.  For more than ten years, she describes having episodes of tender vulva "sores."  Over the past year, the frequency seems to have increased, now occurring about every 3-4 months.  She had been seen 21 by Nalini Gonzalez for a right labial lesion.  At that time, HSV pcr was negative.  She describes having a similar lesion 2021.  Currently, she describes having a painful area on her lower left labia for the past 3 weeks.  With voiding, if urine touches this area, she has significant burning.  She has been using nystatin cream without significant improvement.  She is postmenopausal, not HRT.  No LMP recorded (lmp unknown). Patient is postmenopausal.    Past Medical History:   Diagnosis Date    Arthritis     GERD (gastroesophageal reflux disease)     HTN (hypertension)     Premature atrial complexes     Rheumatoid arthritis        Past Surgical History:   Procedure Laterality Date    LUMBAR SPINE SURGERY           ROS:  GENERAL: Feeling well overall.   SKIN: Reports vulva lesion.  HEAD: Denies head injury or headache.   NODES: Denies enlarged lymph nodes.   CHEST: Denies chest pain or shortness of breath.   CARDIOVASCULAR: Denies palpitations or left sided chest pain.   ABDOMEN: No abdominal pain, nausea, vomiting or rectal bleeding.   URINARY: No dysuria or hematuria.  REPRODUCTIVE: See HPI.   BREASTS: Denies pain, lumps, or nipple discharge.   HEMATOLOGIC: No easy bruisability or excessive bleeding.   MUSCULOSKELETAL:  Reports knee and low back discomfort.  NEUROLOGIC: Denies syncope or weakness.   PSYCHIATRIC: Denies depression.    PE:   (chaperone present during entire exam)  APPEARANCE: Well nourished, well developed, in no acute distress.  ABDOMEN: Soft. No tenderness or masses.   VULVA: Atrophic.  Patient's inferior left labia " major with tender ulcerated lesion- cultured      Diagnosis:  1. Vulvar ulceration          PLAN:    Orders Placed This Encounter    HSV by Rapid PCR, Non-Blood [facility]; Other (Specify) (Patient left labia major)       Patient was counseled today on her recurrent tender vulva ulcerations and the various etiologies.  Her symptoms and exam today seem consistent with HSV.  However, prior HSV pcr of a different lesion was negative.  HSV pcr was performed today on her current vulvar ulceration.  We will contact her with results and discuss management plan.    Follow-up after testing returns    I spent a total of 20 minutes on the day of the visit.This includes face to face time and non-face to face time preparing to see the patient (eg, review of tests), Obtaining and/or reviewing separately obtained history, Documenting clinical information in the electronic or other health record, Independently interpreting resultsand communicating results to the patient/family/caregiver, or Care coordination.

## 2022-01-11 ENCOUNTER — TELEPHONE (OUTPATIENT)
Dept: OBSTETRICS AND GYNECOLOGY | Facility: CLINIC | Age: 77
End: 2022-01-11
Payer: MEDICARE

## 2022-01-11 LAB
HSV PCR SPECIMEN SOURCE: ABNORMAL
HSV1 PCR RESULT: NOT DETECTED
HSV2 PCR RESULT: DETECTED

## 2022-01-11 RX ORDER — VALACYCLOVIR HYDROCHLORIDE 500 MG/1
500 TABLET, FILM COATED ORAL 2 TIMES DAILY
Qty: 10 TABLET | Refills: 4 | Status: SHIPPED | OUTPATIENT
Start: 2022-01-11 | End: 2022-02-28

## 2022-01-11 NOTE — TELEPHONE ENCOUNTER
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    Rx Valtrex sent to pharmacy.

## 2022-01-15 ENCOUNTER — OFFICE VISIT (OUTPATIENT)
Dept: URGENT CARE | Facility: CLINIC | Age: 77
End: 2022-01-15
Payer: MEDICARE

## 2022-01-15 VITALS
HEART RATE: 78 BPM | BODY MASS INDEX: 31.65 KG/M2 | DIASTOLIC BLOOD PRESSURE: 75 MMHG | TEMPERATURE: 99 F | RESPIRATION RATE: 18 BRPM | HEIGHT: 62 IN | OXYGEN SATURATION: 95 % | SYSTOLIC BLOOD PRESSURE: 143 MMHG | WEIGHT: 172 LBS

## 2022-01-15 DIAGNOSIS — R10.9 ABDOMINAL CRAMPING: ICD-10-CM

## 2022-01-15 DIAGNOSIS — R19.7 DIARRHEA, UNSPECIFIED TYPE: Primary | ICD-10-CM

## 2022-01-15 DIAGNOSIS — R19.7 BLOODY DIARRHEA: ICD-10-CM

## 2022-01-15 DIAGNOSIS — Z87.19 H/O DIVERTICULITIS OF COLON: ICD-10-CM

## 2022-01-15 LAB
CTP QC/QA: YES
SARS-COV-2 RDRP RESP QL NAA+PROBE: NEGATIVE

## 2022-01-15 PROCEDURE — 99203 PR OFFICE/OUTPT VISIT, NEW, LEVL III, 30-44 MIN: ICD-10-PCS | Mod: S$GLB,CS,, | Performed by: PHYSICIAN ASSISTANT

## 2022-01-15 PROCEDURE — 1159F PR MEDICATION LIST DOCUMENTED IN MEDICAL RECORD: ICD-10-PCS | Mod: CPTII,S$GLB,, | Performed by: PHYSICIAN ASSISTANT

## 2022-01-15 PROCEDURE — 3077F SYST BP >= 140 MM HG: CPT | Mod: CPTII,S$GLB,, | Performed by: PHYSICIAN ASSISTANT

## 2022-01-15 PROCEDURE — 99203 OFFICE O/P NEW LOW 30 MIN: CPT | Mod: S$GLB,CS,, | Performed by: PHYSICIAN ASSISTANT

## 2022-01-15 PROCEDURE — U0002 COVID-19 LAB TEST NON-CDC: HCPCS | Mod: QW,S$GLB,, | Performed by: PHYSICIAN ASSISTANT

## 2022-01-15 PROCEDURE — 1159F MED LIST DOCD IN RCRD: CPT | Mod: CPTII,S$GLB,, | Performed by: PHYSICIAN ASSISTANT

## 2022-01-15 PROCEDURE — 1160F PR REVIEW ALL MEDS BY PRESCRIBER/CLIN PHARMACIST DOCUMENTED: ICD-10-PCS | Mod: CPTII,S$GLB,, | Performed by: PHYSICIAN ASSISTANT

## 2022-01-15 PROCEDURE — U0002: ICD-10-PCS | Mod: QW,S$GLB,, | Performed by: PHYSICIAN ASSISTANT

## 2022-01-15 PROCEDURE — 1160F RVW MEDS BY RX/DR IN RCRD: CPT | Mod: CPTII,S$GLB,, | Performed by: PHYSICIAN ASSISTANT

## 2022-01-15 PROCEDURE — 3078F PR MOST RECENT DIASTOLIC BLOOD PRESSURE < 80 MM HG: ICD-10-PCS | Mod: CPTII,S$GLB,, | Performed by: PHYSICIAN ASSISTANT

## 2022-01-15 PROCEDURE — 3077F PR MOST RECENT SYSTOLIC BLOOD PRESSURE >= 140 MM HG: ICD-10-PCS | Mod: CPTII,S$GLB,, | Performed by: PHYSICIAN ASSISTANT

## 2022-01-15 PROCEDURE — 3078F DIAST BP <80 MM HG: CPT | Mod: CPTII,S$GLB,, | Performed by: PHYSICIAN ASSISTANT

## 2022-01-15 RX ORDER — CIPROFLOXACIN 500 MG/1
500 TABLET ORAL 2 TIMES DAILY
Qty: 14 TABLET | Refills: 0 | Status: SHIPPED | OUTPATIENT
Start: 2022-01-15 | End: 2022-01-22

## 2022-01-15 RX ORDER — METRONIDAZOLE 500 MG/1
500 TABLET ORAL 3 TIMES DAILY
Qty: 21 TABLET | Refills: 0 | Status: SHIPPED | OUTPATIENT
Start: 2022-01-15 | End: 2022-01-22

## 2022-01-15 NOTE — PROGRESS NOTES
"Subjective:       Patient ID: Jocelyne Duncan is a 76 y.o. female.    Vitals:  height is 5' 2" (1.575 m) and weight is 78 kg (172 lb). Her temperature is 99.1 °F (37.3 °C). Her blood pressure is 143/75 (abnormal) and her pulse is 78. Her respiration is 18 and oxygen saturation is 95%.     Chief Complaint: Diarrhea, Rectal Bleeding (Started today /), and Abdominal Cramping (Lower abd )    Pt c/o bloody diarrhea and abdominal cramping since yesterday.  She describes the blood as bright and sometimes dark.  Denies vomiting or h/o hemorrhoids.  She states she had similar symptoms 2 1/2 years ago and went to the ER.  Pt states she was diagnosed with ulcerative colitis.  She states she scheduled an appt with GI but it is not until 2/4. Pt was able to eat bread and drink coffee this morning.  Pt reports that her symptoms have improved since presenting to the clinic.    Diarrhea   This is a new problem. The current episode started yesterday. The problem occurs 5 to 10 times per day. The problem has been gradually worsening. The stool consistency is described as bloody. Associated symptoms include abdominal pain (cramping lower ). Pertinent negatives include no chills, coughing, fever, increased  flatus, myalgias, vomiting or weight loss. Nothing aggravates the symptoms. She has tried nothing for the symptoms.       Constitution: Negative for chills, sweating, fatigue and fever.   HENT: Negative for ear pain.    Cardiovascular: Negative for chest pain.   Respiratory: Negative for cough and shortness of breath.    Gastrointestinal: Positive for abdominal pain (cramping lower ), diarrhea, bright red blood in stool and rectal bleeding. Negative for vomiting, hemorrhoids and heartburn.   Musculoskeletal: Negative for muscle ache.       Objective:      Physical Exam   Constitutional: She is oriented to person, place, and time. She appears well-developed and well-nourished.   HENT:   Head: Normocephalic and atraumatic.   Ears: "   Right Ear: External ear normal.   Left Ear: External ear normal.   Nose: Nose normal.   Mouth/Throat: Mucous membranes are normal.   Eyes: Conjunctivae and lids are normal.   Neck: Trachea normal. Neck supple.   Cardiovascular: Normal rate, regular rhythm and normal heart sounds.   Pulmonary/Chest: Effort normal and breath sounds normal. No stridor. No respiratory distress. She has no decreased breath sounds. She has no wheezes. She has no rhonchi. She has no rales.   Abdominal: Normal appearance and bowel sounds are normal. She exhibits no distension, no abdominal bruit, no pulsatile midline mass and no mass. Soft. There is no abdominal tenderness. There is no rebound and no guarding.   Musculoskeletal: Normal range of motion.         General: No edema. Normal range of motion.   Neurological: She is alert and oriented to person, place, and time. She has normal strength.   Skin: Skin is warm, dry, intact, not diaphoretic and not pale.   Psychiatric: She has a normal mood and affect. Her speech is normal and behavior is normal. Judgment and thought content normal. Cognition and memory  Nursing note and vitals reviewed.    Results for orders placed or performed in visit on 01/15/22   POCT COVID-19 Rapid Screening   Result Value Ref Range    POC Rapid COVID Negative Negative     Acceptable Yes        Results reviewed with pt      Assessment:       1. Diarrhea, unspecified type    2. H/O diverticulitis of colon    3. Bloody diarrhea    4. Abdominal cramping          Plan:         Diarrhea, unspecified type  -     POCT COVID-19 Rapid Screening  -     Ambulatory referral/consult to Gastroenterology    H/O diverticulitis of colon  -     ciprofloxacin HCl (CIPRO) 500 MG tablet; Take 1 tablet (500 mg total) by mouth 2 (two) times daily. for 7 days  Dispense: 14 tablet; Refill: 0  -     metroNIDAZOLE (FLAGYL) 500 MG tablet; Take 1 tablet (500 mg total) by mouth 3 (three) times daily. for 7 days  Dispense: 21  tablet; Refill: 0  -     Ambulatory referral/consult to Gastroenterology    Bloody diarrhea  -     Ambulatory referral/consult to Gastroenterology    Abdominal cramping  -     Ambulatory referral/consult to Gastroenterology           Medical Decision Making:   History:   Old Records Summarized: records from clinic visits.  Initial Assessment:   76 y.o. female with rectal bleeding and diarrhea  Clinical Tests:   Lab Tests: Ordered and Reviewed  Strong ER precautions given.  Go to ER if symptoms worsen.  Left message with Clinic  to schedule pt urgently with GI.  Per chart review of ER visit on 8/3/2019, pt was treated for diverticulitis.  Pt's current symptoms are identical to those from ER visit in 2019.  Cipro and Flagyl sent to pharmacy.  Pt advised to gradually advance diet as tolerated.  Vitals WNL without tachycardia or hypotension; no signs of severe GI bleeding and normal abdominal exam.       Patient Instructions   Patient Education       Diarrhea, Adult   General Information   Most doctors say you have diarrhea if you have 3 or more runny or watery stools or bowel movements in a day. The doctors feel that the risk of a serious cause for your diarrhea is low.  Diarrhea that starts all of a sudden is most often caused by a virus or bacteria. This will likely get better on its own after a few days. Other things can cause you to have loose stools like:  · Side effects from the medicines you take.  · Problems digesting your food.  · Problems with your digestive system.  You may be waiting on some test results. The staff will contact you if there are concerning results.  What care is needed at home?   · Call your regular doctor to let them know you were in the ED. Make a follow-up appointment if you were told to.  · Drink small amounts of fluid every 15 to 30 minutes. Good fluids to drink are water, broth, and oral electrolyte solutions. Sugar-free or very low sugar sports drinks are also OK.  · Try to  eat a small amount of food. Good foods to eat are potatoes, noodles, rice, oatmeal, crackers, soup, soft vegetables, and bananas. Avoid fatty foods and dairy products.  · Wash your hands often. This will help keep others healthy. It is easy to spread diarrhea from one person to the next.  · Stay home from school or work until you have stopped having diarrhea. Do not cook food for others while you have diarrhea.  · If you were given any medicines, make sure to take them as you were told.  When do I need to get emergency help?   · Return to the ED if:   ? You have signs of severe fluid loss, such as:  § No urine for more than 8 hours.  § Feel very light-headed or like you are going to pass out.  § Feel weak like you are going to fall.  ? Your stools have a large amount (more than 1 teaspoon or 5 mL) of blood in them.  ? You have very bad belly pain.  When do I need to call the doctor?   · You have more than 6 runny, watery stools in 24 hours.  · You have a fever of 101.3°F (38.5°C) or higher or chills that do not go away after a day.  · You have very bad belly pain.  · Your stools have a small amount (less than 1 teaspoon or 5 mL) of blood in them.  · You develop early signs of fluid loss, such as:  ? Your urine is very dark colored.  ? Your mouth is dry.  ? You have muscle cramps.  ? You have a lack of energy.  ? You feel light-headed when you get up.  · You have new or worsening symptoms.  Last Reviewed Date   2021-05-21  Consumer Information Use and Disclaimer   This information is not specific medical advice and does not replace information you receive from your health care provider. This is only a brief summary of general information. It does NOT include all information about conditions, illnesses, injuries, tests, procedures, treatments, therapies, discharge instructions or life-style choices that may apply to you. You must talk with your health care provider for complete information about your health and  treatment options. This information should not be used to decide whether or not to accept your health care providers advice, instructions or recommendations. Only your health care provider has the knowledge and training to provide advice that is right for you.  Copyright   Copyright © 2021 [employer] and its affiliates and/or licensors. All rights reserved.  Patient Education       Abdominal Pain, Adult   General Information     Many things can cause belly pain. Some are serious things like bleeding or an infection. Less serious things, like an upset stomach, can also cause belly pain.  The doctors may not be able to find all serious causes of belly pain the first time they see you. It is important that you follow up with your doctor. You may be waiting on some test results. The staff will notify you if there are concerning results.  What care is needed at home?   · Call your regular doctor to let them know you were in the ED. Make a follow-up appointment if you were told to.  · Keep a diary about your pain to help your doctor learn more about the cause. Write down the foods you eat to see if they may be the cause of your pain. Also write down what you were doing before and during the pain.  · Eat small meals more often. Eat more fiber if hard stools are a problem.  · Avoid foods or drinks that make your pain worse. Some people are bothered by:  ? Drinks that are fizzy or have caffeine.  ? Fried, greasy, or fatty foods.  ? Orange juice.  ? Milk or cheese can bother some peoples stomach as well.  · When you have pain, you can:  ? Try to have a bowel movement.  ? Lie down and rest.  ? Avoid solid foods for a few hours. If you are hungry, try liquids like broth or water. When you feel better, try mild foods like rice, crackers, bananas, applesauce, or toast.  · Dont take over-the-counter medicines, such as antacids or laxatives, unless they are ordered.  · Check with the doctor before you take any herbal  medicines or supplements.  When do I need to get emergency help?   · Call for an ambulance right away if:   ? You have sudden severe belly pain, or the pain is constant.  ? You have trouble breathing or chest pain along with your belly pain.  ? You start throwing up blood or pass a lot of blood in your stool.  ? Your belly becomes very hard or swollen.  ? You get a fever 102.2°F (39°C) or higher or shaking chills.  · Return to the ED if:   ? You have signs of severe fluid loss, such as:  § No urine for more than 8 hours.  § You feel very light-headed or like you are going to pass out.  § You feel weak, like you are going to fall.  ? Your pain gets worse, comes more often or moves to one area of the belly  ? You have an upset stomach or throwing up that isnt getting better and are having trouble keeping down food and drink.  ? Your stools are black or tar colored.  When do I need to call the doctor?   · If the pain is not gone or getting better in 1 to 2 days.  · You have a fever of 100.4°F (38°C) or higher, chills.  · You develop early signs of fluid loss, such as:  ? Your urine is very dark colored.  ? Your mouth is dry.  ? You have muscle cramps.  ? You have a lack of energy.  ? You feel light-headed when you get up.  · You have pain with passing urine or have blood in your urine.  · Your stools have a small amount (less than 1 teaspoon or 5 mL) of blood in them.  · You feel that something is not right in your belly.  · You have new or worsening symptoms.  Last Reviewed Date   2021-05-07  Consumer Information Use and Disclaimer   This information is not specific medical advice and does not replace information you receive from your health care provider. This is only a brief summary of general information. It does NOT include all information about conditions, illnesses, injuries, tests, procedures, treatments, therapies, discharge instructions or life-style choices that may apply to you. You must talk with your  health care provider for complete information about your health and treatment options. This information should not be used to decide whether or not to accept your health care providers advice, instructions or recommendations. Only your health care provider has the knowledge and training to provide advice that is right for you.  Copyright   Copyright © 2021 [employer] and its affiliates and/or licensors. All rights reserved.      Patient Education       Diverticulitis Discharge Instructions   About this topic   Sometimes, you may get small pouches or pockets in the walls of your large bowel. This is called diverticulosis. The pouches may become inflamed or infected. This is called diverticulitis. You are more likely to have this problem if you are between 60 and 80 years of age or if you have chronic constipation.     What care is needed at home?   · Ask your doctor what you need to do when you go home. Make sure you ask questions if you do not understand what the doctor says. This way you will know what you need to do.  · Take your drugs as ordered by your doctor.  · Eat a balanced diet.  · Do not delay having a bowel movement. Go as soon as you have the urge.  · Do not strain or force your bowel movements.  · Drink 8 to 10 glasses of water each day. Talk to your doctor if you are drinking less fluids due to a health problem.  What follow-up care is needed?   Your doctor may ask you to make visits to the office to check on your progress. Be sure to keep these visits.  What drugs may be needed?   The doctor may order drugs to:  · Help with pain and swelling  · Fight an infection  · Soften stools to help prevent straining with bowel movements  Will physical activity be limited?   When you are in pain, you may need to rest in bed. To ease the pain, use a heat compress on your belly. This should last only for a few days.  What changes to diet are needed?   Talk to your doctor about any changes you need to make to  your diet. When the pouches become inflamed or infected, you may need to take in just liquids for a few days. This may help lessen your pain and help you heal.  When you are feeling better, start to add more fiber to your diet.  · You do not need to avoid seeds, nuts, corn, or other similar foods.  · You will need to eat food rich in fiber and drink more water.  ? Eat 5 or more servings of fresh fruits and vegetables every day.  ? Eat 6 or more servings of whole-wheat grain breads and cereals.  ? Try to get 25 to 30 grams of fiber every day. Read the labels to learn how much fiber is in foods.  ? Do not drink beer, wine, and mixed drinks (alcohol).  What problems could happen?   · Pockets or pouches in your bowel may be infected or filled with pus.  · Hole or tear in your bowel  · Narrowing of a part in your bowel  · Surgery may be needed to drain an infection or abscess  · If untreated, the colon may rupture and surgery may be needed.  What can be done to prevent this health problem?   The best way to keep from having diverticulosis is to keep your bowel movements soft and normal. To keep more pouches from forming:  · Eat more whole grains, vegetables, and fruits. Try to get 25 to 30 grams of fiber each day. Read the labels to learn how much fiber is in foods.  · Drink more water. Drink ten 8 ounce (240 mL) glasses a day.  · Be active. Walk, garden, or do something active for 30 minutes or more on most days of the week.  · Talk with your doctor about adding an over-the-counter (OTC) fiber product to keep your stools soft.  · Overuse of some pain drugs can cause hard stools; talk with your doctor.  · If you have a lot of pouches in your large intestine, surgery may be right for you. Talk to your doctor about this.  When do I need to call the doctor?   · Signs of infection. These include a fever of 100.4°F (38°C) or higher and chills.  · Upset stomach or very bad belly pain.  · Vomiting or being unable to eat, drink,  or take your medications.  · Extreme fatigue, lightheadedness, dizziness, or passing out.  · Diarrhea or blood in your stool.  · Not having bowel movements or not passing any gas.  Teach Back: Helping You Understand   The Teach Back Method helps you understand the information we are giving you. After you talk with the staff, tell them in your own words what you learned. This helps to make sure the staff has described each thing clearly. It also helps to explain things that may have been confusing. Before going home, make sure you can do these:  · I can tell you about my condition.  · I can tell you what changes I need to make with my diet or drugs.  · I can tell you what I will do if I have very bad belly pain or hard or bloody stools.  Where can I learn more?   [employer]  https://[email or web address]/[email or web address]-[email or web address]   International Foundation for Functional Gastrointestinal Disorders  https://www.iffgd.org/other-disorders/diverticulosis-and-diverticulitis.html   Last Reviewed Date   2021-04-13  Consumer Information Use and Disclaimer   This information is not specific medical advice and does not replace information you receive from your health care provider. This is only a brief summary of general information. It does NOT include all information about conditions, illnesses, injuries, tests, procedures, treatments, therapies, discharge instructions or life-style choices that may apply to you. You must talk with your health care provider for complete information about your health and treatment options. This information should not be used to decide whether or not to accept your health care providers advice, instructions or recommendations. Only your health care provider has the knowledge and training to provide advice that is right for you.  Copyright   Copyright © 2021 [employer] and its affiliates and/or licensors. All rights reserved.      You must understand that you've  received an Urgent Care treatment only and that you may be released before all your medical problems are known or treated. You, the patient, will arrange for follow up care as instructed.    Follow up with your PCP or specialty clinic as directed in the next 1-2 weeks if not improved or as needed. You can call [phone] to schedule an appointment with the appropriate provider.    If your condition worsens we recommend that you receive another evaluation at the emergency room immediately or contact your primary medical clinic's after hours call service to discuss your concerns.    Please go to the Emergency Department for any concerns or worsening of condition.\

## 2022-01-15 NOTE — PATIENT INSTRUCTIONS
Patient Education       Diarrhea, Adult   General Information   Most doctors say you have diarrhea if you have 3 or more runny or watery stools or bowel movements in a day. The doctors feel that the risk of a serious cause for your diarrhea is low.  Diarrhea that starts all of a sudden is most often caused by a virus or bacteria. This will likely get better on its own after a few days. Other things can cause you to have loose stools like:  · Side effects from the medicines you take.  · Problems digesting your food.  · Problems with your digestive system.  You may be waiting on some test results. The staff will contact you if there are concerning results.  What care is needed at home?   · Call your regular doctor to let them know you were in the ED. Make a follow-up appointment if you were told to.  · Drink small amounts of fluid every 15 to 30 minutes. Good fluids to drink are water, broth, and oral electrolyte solutions. Sugar-free or very low sugar sports drinks are also OK.  · Try to eat a small amount of food. Good foods to eat are potatoes, noodles, rice, oatmeal, crackers, soup, soft vegetables, and bananas. Avoid fatty foods and dairy products.  · Wash your hands often. This will help keep others healthy. It is easy to spread diarrhea from one person to the next.  · Stay home from school or work until you have stopped having diarrhea. Do not cook food for others while you have diarrhea.  · If you were given any medicines, make sure to take them as you were told.  When do I need to get emergency help?   · Return to the ED if:   ? You have signs of severe fluid loss, such as:  § No urine for more than 8 hours.  § Feel very light-headed or like you are going to pass out.  § Feel weak like you are going to fall.  ? Your stools have a large amount (more than 1 teaspoon or 5 mL) of blood in them.  ? You have very bad belly pain.  When do I need to call the doctor?   · You have more than 6 runny, watery stools in 24  hours.  · You have a fever of 101.3°F (38.5°C) or higher or chills that do not go away after a day.  · You have very bad belly pain.  · Your stools have a small amount (less than 1 teaspoon or 5 mL) of blood in them.  · You develop early signs of fluid loss, such as:  ? Your urine is very dark colored.  ? Your mouth is dry.  ? You have muscle cramps.  ? You have a lack of energy.  ? You feel light-headed when you get up.  · You have new or worsening symptoms.  Last Reviewed Date   2021-05-21  Consumer Information Use and Disclaimer   This information is not specific medical advice and does not replace information you receive from your health care provider. This is only a brief summary of general information. It does NOT include all information about conditions, illnesses, injuries, tests, procedures, treatments, therapies, discharge instructions or life-style choices that may apply to you. You must talk with your health care provider for complete information about your health and treatment options. This information should not be used to decide whether or not to accept your health care providers advice, instructions or recommendations. Only your health care provider has the knowledge and training to provide advice that is right for you.  Copyright   Copyright © 2021 [employer] and its affiliates and/or licensors. All rights reserved.  Patient Education       Abdominal Pain, Adult   General Information     Many things can cause belly pain. Some are serious things like bleeding or an infection. Less serious things, like an upset stomach, can also cause belly pain.  The doctors may not be able to find all serious causes of belly pain the first time they see you. It is important that you follow up with your doctor. You may be waiting on some test results. The staff will notify you if there are concerning results.  What care is needed at home?   · Call your regular doctor to let them know you were in the ED. Make a  follow-up appointment if you were told to.  · Keep a diary about your pain to help your doctor learn more about the cause. Write down the foods you eat to see if they may be the cause of your pain. Also write down what you were doing before and during the pain.  · Eat small meals more often. Eat more fiber if hard stools are a problem.  · Avoid foods or drinks that make your pain worse. Some people are bothered by:  ? Drinks that are fizzy or have caffeine.  ? Fried, greasy, or fatty foods.  ? Orange juice.  ? Milk or cheese can bother some peoples stomach as well.  · When you have pain, you can:  ? Try to have a bowel movement.  ? Lie down and rest.  ? Avoid solid foods for a few hours. If you are hungry, try liquids like broth or water. When you feel better, try mild foods like rice, crackers, bananas, applesauce, or toast.  · Dont take over-the-counter medicines, such as antacids or laxatives, unless they are ordered.  · Check with the doctor before you take any herbal medicines or supplements.  When do I need to get emergency help?   · Call for an ambulance right away if:   ? You have sudden severe belly pain, or the pain is constant.  ? You have trouble breathing or chest pain along with your belly pain.  ? You start throwing up blood or pass a lot of blood in your stool.  ? Your belly becomes very hard or swollen.  ? You get a fever 102.2°F (39°C) or higher or shaking chills.  · Return to the ED if:   ? You have signs of severe fluid loss, such as:  § No urine for more than 8 hours.  § You feel very light-headed or like you are going to pass out.  § You feel weak, like you are going to fall.  ? Your pain gets worse, comes more often or moves to one area of the belly  ? You have an upset stomach or throwing up that isnt getting better and are having trouble keeping down food and drink.  ? Your stools are black or tar colored.  When do I need to call the doctor?   · If the pain is not gone or getting better  in 1 to 2 days.  · You have a fever of 100.4°F (38°C) or higher, chills.  · You develop early signs of fluid loss, such as:  ? Your urine is very dark colored.  ? Your mouth is dry.  ? You have muscle cramps.  ? You have a lack of energy.  ? You feel light-headed when you get up.  · You have pain with passing urine or have blood in your urine.  · Your stools have a small amount (less than 1 teaspoon or 5 mL) of blood in them.  · You feel that something is not right in your belly.  · You have new or worsening symptoms.  Last Reviewed Date   2021-05-07  Consumer Information Use and Disclaimer   This information is not specific medical advice and does not replace information you receive from your health care provider. This is only a brief summary of general information. It does NOT include all information about conditions, illnesses, injuries, tests, procedures, treatments, therapies, discharge instructions or life-style choices that may apply to you. You must talk with your health care provider for complete information about your health and treatment options. This information should not be used to decide whether or not to accept your health care providers advice, instructions or recommendations. Only your health care provider has the knowledge and training to provide advice that is right for you.  Copyright   Copyright © 2021 [employer] and its affiliates and/or licensors. All rights reserved.      Patient Education       Diverticulitis Discharge Instructions   About this topic   Sometimes, you may get small pouches or pockets in the walls of your large bowel. This is called diverticulosis. The pouches may become inflamed or infected. This is called diverticulitis. You are more likely to have this problem if you are between 60 and 80 years of age or if you have chronic constipation.     What care is needed at home?   · Ask your doctor what you need to do when you go home. Make sure you ask questions if you do not  understand what the doctor says. This way you will know what you need to do.  · Take your drugs as ordered by your doctor.  · Eat a balanced diet.  · Do not delay having a bowel movement. Go as soon as you have the urge.  · Do not strain or force your bowel movements.  · Drink 8 to 10 glasses of water each day. Talk to your doctor if you are drinking less fluids due to a health problem.  What follow-up care is needed?   Your doctor may ask you to make visits to the office to check on your progress. Be sure to keep these visits.  What drugs may be needed?   The doctor may order drugs to:  · Help with pain and swelling  · Fight an infection  · Soften stools to help prevent straining with bowel movements  Will physical activity be limited?   When you are in pain, you may need to rest in bed. To ease the pain, use a heat compress on your belly. This should last only for a few days.  What changes to diet are needed?   Talk to your doctor about any changes you need to make to your diet. When the pouches become inflamed or infected, you may need to take in just liquids for a few days. This may help lessen your pain and help you heal.  When you are feeling better, start to add more fiber to your diet.  · You do not need to avoid seeds, nuts, corn, or other similar foods.  · You will need to eat food rich in fiber and drink more water.  ? Eat 5 or more servings of fresh fruits and vegetables every day.  ? Eat 6 or more servings of whole-wheat grain breads and cereals.  ? Try to get 25 to 30 grams of fiber every day. Read the labels to learn how much fiber is in foods.  ? Do not drink beer, wine, and mixed drinks (alcohol).  What problems could happen?   · Pockets or pouches in your bowel may be infected or filled with pus.  · Hole or tear in your bowel  · Narrowing of a part in your bowel  · Surgery may be needed to drain an infection or abscess  · If untreated, the colon may rupture and surgery may be needed.  What can be  done to prevent this health problem?   The best way to keep from having diverticulosis is to keep your bowel movements soft and normal. To keep more pouches from forming:  · Eat more whole grains, vegetables, and fruits. Try to get 25 to 30 grams of fiber each day. Read the labels to learn how much fiber is in foods.  · Drink more water. Drink ten 8 ounce (240 mL) glasses a day.  · Be active. Walk, garden, or do something active for 30 minutes or more on most days of the week.  · Talk with your doctor about adding an over-the-counter (OTC) fiber product to keep your stools soft.  · Overuse of some pain drugs can cause hard stools; talk with your doctor.  · If you have a lot of pouches in your large intestine, surgery may be right for you. Talk to your doctor about this.  When do I need to call the doctor?   · Signs of infection. These include a fever of 100.4°F (38°C) or higher and chills.  · Upset stomach or very bad belly pain.  · Vomiting or being unable to eat, drink, or take your medications.  · Extreme fatigue, lightheadedness, dizziness, or passing out.  · Diarrhea or blood in your stool.  · Not having bowel movements or not passing any gas.  Teach Back: Helping You Understand   The Teach Back Method helps you understand the information we are giving you. After you talk with the staff, tell them in your own words what you learned. This helps to make sure the staff has described each thing clearly. It also helps to explain things that may have been confusing. Before going home, make sure you can do these:  · I can tell you about my condition.  · I can tell you what changes I need to make with my diet or drugs.  · I can tell you what I will do if I have very bad belly pain or hard or bloody stools.  Where can I learn more?   American Academy of [employer]icians  https://familydoctor.org/condition/diverticular-disease/   International Foundation for Functional Gastrointestinal  Disorders  https://www.iffgd.org/other-disorders/diverticulosis-and-diverticulitis.html   Last Reviewed Date   2021-04-13  Consumer Information Use and Disclaimer   This information is not specific medical advice and does not replace information you receive from your health care provider. This is only a brief summary of general information. It does NOT include all information about conditions, illnesses, injuries, tests, procedures, treatments, therapies, discharge instructions or life-style choices that may apply to you. You must talk with your health care provider for complete information about your health and treatment options. This information should not be used to decide whether or not to accept your health care providers advice, instructions or recommendations. Only your health care provider has the knowledge and training to provide advice that is right for you.  Copyright   Copyright © 2021 [employer], [employer]. and its affiliates and/or licensors. All rights reserved.      You must understand that you've received an Urgent Care treatment only and that you may be released before all your medical problems are known or treated. You, the patient, will arrange for follow up care as instructed.    Follow up with your PCP or specialty clinic as directed in the next 1-2 weeks if not improved or as needed. You can call [phone] to schedule an appointment with the appropriate provider.    If your condition worsens we recommend that you receive another evaluation at the emergency room immediately or contact your primary medical clinic's after hours call service to discuss your concerns.    Please go to the Emergency Department for any concerns or worsening of condition.\

## 2022-01-27 ENCOUNTER — OFFICE VISIT (OUTPATIENT)
Dept: GASTROENTEROLOGY | Facility: CLINIC | Age: 77
End: 2022-01-27
Payer: MEDICARE

## 2022-01-27 VITALS
HEIGHT: 62 IN | BODY MASS INDEX: 31.28 KG/M2 | DIASTOLIC BLOOD PRESSURE: 62 MMHG | SYSTOLIC BLOOD PRESSURE: 125 MMHG | WEIGHT: 170 LBS | HEART RATE: 83 BPM

## 2022-01-27 DIAGNOSIS — K92.1 HEMATOCHEZIA: Primary | ICD-10-CM

## 2022-01-27 PROCEDURE — 99203 OFFICE O/P NEW LOW 30 MIN: CPT | Mod: GC,S$GLB,, | Performed by: STUDENT IN AN ORGANIZED HEALTH CARE EDUCATION/TRAINING PROGRAM

## 2022-01-27 PROCEDURE — 99203 PR OFFICE/OUTPT VISIT, NEW, LEVL III, 30-44 MIN: ICD-10-PCS | Mod: GC,S$GLB,, | Performed by: STUDENT IN AN ORGANIZED HEALTH CARE EDUCATION/TRAINING PROGRAM

## 2022-01-27 PROCEDURE — 99999 PR PBB SHADOW E&M-EST. PATIENT-LVL IV: ICD-10-PCS | Mod: PBBFAC,GC,, | Performed by: STUDENT IN AN ORGANIZED HEALTH CARE EDUCATION/TRAINING PROGRAM

## 2022-01-27 PROCEDURE — 99999 PR PBB SHADOW E&M-EST. PATIENT-LVL IV: CPT | Mod: PBBFAC,GC,, | Performed by: STUDENT IN AN ORGANIZED HEALTH CARE EDUCATION/TRAINING PROGRAM

## 2022-01-27 NOTE — PROGRESS NOTES
[facility]    Reason for visit: There were no encounter diagnoses.  Referring Provider/PCP: ANCELMO Weston    History of Present Illness:  Ms Duncan is a 77yo PMHx GERD s/p nissen fundoplication (2005), HTN, HLD, RA on MTX/ steroid therapy, PACs f/w cardiology    Recently presented to urgent care on 01/15 w/ bloody diarrhea. Reported 5-10 episodes that day that progressed to uma blood associated w/ cramping lower quadrant abd pain. VS were wnl. No labs collected. She was treated for diverticulitis w/o any abdominal imaging and discharged with prescription for cipro/ flagyl. She has finished the 2 week course. Reports resolution of bloody diarrhea. Reports yesterday she had 4 formed brown BMs yesterday--baseline is 2.     Prior episode of hematochezia was in 08/2019. Patient had CTA A+P that demonstrated active colitis from splenic flexure through transverse colon as well as extensive diverticulosis. She was discharged with cipro/ flagyl and symptoms improved. She then followed up with a Gastroenterologist at [facility] and had colonoscopy (2019) demonstrated UC.     PEndoHx:  No records as all done at [facility]    Review of Systems   Constitutional: Negative.    HENT: Negative.    Eyes: Negative.    Respiratory: Negative.    Cardiovascular: Negative.    Gastrointestinal: Negative.    Genitourinary: Negative.    Musculoskeletal: Negative.    Skin: Negative.    Neurological: Negative.    Endo/Heme/Allergies: Negative.    Psychiatric/Behavioral: Negative.        Medical History:  Past Medical History:   Diagnosis Date    Arthritis     GERD (gastroesophageal reflux disease)     HTN (hypertension)     Premature atrial complexes     Rheumatoid arthritis        Past Surgical History:   Procedure Laterality Date    LUMBAR SPINE SURGERY         Family History   Problem Relation Age of Onset    Breast cancer Neg Hx     Colon cancer Neg Hx     Ovarian cancer Neg Hx        Social  History     Socioeconomic History    Marital status:    Tobacco Use    Smoking status: Never Smoker    Smokeless tobacco: Never Used   Substance and Sexual Activity    Alcohol use: Yes     Comment: occ    Drug use: Never    Sexual activity: Not Currently     Partners: Male     Birth control/protection: Post-menopausal       Current Outpatient Medications on File Prior to Visit   Medication Sig Dispense Refill    Al hyd-Mg tr-alg ac-sod bicarb 80-20 mg Chew Take 1 tablet by mouth once daily.       amLODIPine (NORVASC) 5 MG tablet Take 5 mg by mouth once daily.       atorvastatin (LIPITOR) 20 MG tablet Take 20 mg by mouth once daily.       BREO ELLIPTA 100-25 mcg/dose diskus inhaler INHALE 1 PUFF INTO THE LUNGS EVERY DAY 60 each 11    diclofenac (VOLTAREN) 75 MG EC tablet Take 75 mg by mouth 2 (two) times daily.      esomeprazole (NEXIUM) 40 MG capsule       folic acid (FOLVITE) 800 MCG Tab Take 800 mcg by mouth once daily.      furosemide (LASIX) 20 MG tablet Take 20 mg by mouth once daily.       gabapentin (NEURONTIN) 300 MG capsule Take 300 mg by mouth 3 (three) times daily.      lisinopril-hydrochlorothiazide (PRINZIDE,ZESTORETIC) 20-12.5 mg per tablet Take 1 tablet by mouth once daily.       methocarbamoL (ROBAXIN) 500 MG Tab Take 500 mg by mouth 3 (three) times daily as needed.      methotrexate 2.5 MG Tab every 7 days.       nystatin (MYCOSTATIN) cream       predniSONE (DELTASONE) 1 MG tablet Take 1 mg by mouth 2 (two) times daily.       valACYclovir (VALTREX) 500 MG tablet Take 1 tablet (500 mg total) by mouth 2 (two) times daily. for 5 days 10 tablet 4    vitamin D3-folic acid 5,000 unit- 1 mg Tab Take 1 tablet by mouth once daily.       No current facility-administered medications on file prior to visit.       Review of patient's allergies indicates:   Allergen Reactions    Iodine      Swelling (throat)^  Unsure of what drug/food triggered the reaction      Adhesive Itching     Penicillins      Physical Exam:  Constitutional:  not in acute distress and well developed  HENT: Head: Normal, normocephalic, atraumatic.  Eyes: conjunctiva clear and sclera nonicteric  Cardiovascular: regular rate and rhythm and no murmur  Respiratory: normal chest expansion & respiratory effort   and no accessory muscle use  GI: soft, non-tender, without masses or organomegaly  Musculoskeletal: no muscular tenderness noted  Skin: normal color  Neurological: alert, oriented x3  Psychiatric: mood and affect are within normal limits, pt is a good historian; no memory problems were noted    Laboratory:  Lab Results   Component Value Date     08/03/2019    K 4.0 08/03/2019     08/03/2019    CO2 20 (L) 08/03/2019    BUN 23 08/03/2019    CREATININE 0.8 08/03/2019    CALCIUM 9.4 08/03/2019    ANIONGAP 14 08/03/2019    ESTGFRAFRICA >60.0 08/03/2019    EGFRNONAA >60.0 08/03/2019       Lab Results   Component Value Date    ALT 17 08/03/2019    AST 18 08/03/2019    ALKPHOS 77 08/03/2019    BILITOT 0.5 08/03/2019       Lab Results   Component Value Date    WBC 12.70 08/03/2019    HGB 12.6 08/03/2019    HCT 38 08/03/2019    MCV 88 08/03/2019     08/03/2019       Microbiology:  No Pertinent Microbiology    Imaging:  No Pertinent Imaging    Assessment:  Jocelyne Duncan is a 76 y.o. female who is presenting for post-hospitalization follow-up of hematochezia, diarrhea, LQ abd pain now resolved.     She follows at [facility] and has follow up scheduled for 02/04. Previously with questionable diagnosis of UC but never on maintenance therapy.    Problems:  1. Hematochezia (resolved)  2. Abd pain (resolved)  3. Diarrhea (resolved)      Plan:  1. Will let her follow up with her [facility] physician for f/u colonoscopy  2. No follow-ups on file.    Power Medina MD  Gastroenterology Fellow    No orders of the defined types were placed in this encounter.

## 2022-02-04 ENCOUNTER — LAB VISIT (OUTPATIENT)
Dept: LAB | Facility: OTHER | Age: 77
End: 2022-02-04
Attending: INTERNAL MEDICINE
Payer: MEDICARE

## 2022-02-04 DIAGNOSIS — R19.7 DIARRHEA: ICD-10-CM

## 2022-02-04 DIAGNOSIS — K92.1 HEMATOCHEZIA: ICD-10-CM

## 2022-02-04 DIAGNOSIS — R10.9 LEFT SIDED ABDOMINAL PAIN: Primary | ICD-10-CM

## 2022-02-04 DIAGNOSIS — K57.30 DIVERTICULOSIS OF LARGE INTESTINE WITHOUT DIVERTICULITIS: ICD-10-CM

## 2022-02-04 LAB
ALBUMIN SERPL BCP-MCNC: 3.7 G/DL (ref 3.5–5.2)
ALP SERPL-CCNC: 61 U/L (ref 55–135)
ALT SERPL W/O P-5'-P-CCNC: 23 U/L (ref 10–44)
ANION GAP SERPL CALC-SCNC: 13 MMOL/L (ref 8–16)
AST SERPL-CCNC: 17 U/L (ref 10–40)
BASOPHILS # BLD AUTO: 0.04 K/UL (ref 0–0.2)
BASOPHILS NFR BLD: 0.5 % (ref 0–1.9)
BILIRUB SERPL-MCNC: 0.4 MG/DL (ref 0.1–1)
BUN SERPL-MCNC: 15 MG/DL (ref 8–23)
CALCIUM SERPL-MCNC: 8.9 MG/DL (ref 8.7–10.5)
CHLORIDE SERPL-SCNC: 106 MMOL/L (ref 95–110)
CO2 SERPL-SCNC: 24 MMOL/L (ref 23–29)
CREAT SERPL-MCNC: 0.8 MG/DL (ref 0.5–1.4)
DIFFERENTIAL METHOD: ABNORMAL
EOSINOPHIL # BLD AUTO: 0.1 K/UL (ref 0–0.5)
EOSINOPHIL NFR BLD: 1 % (ref 0–8)
ERYTHROCYTE [DISTWIDTH] IN BLOOD BY AUTOMATED COUNT: 16.7 % (ref 11.5–14.5)
EST. GFR  (AFRICAN AMERICAN): >60 ML/MIN/1.73 M^2
EST. GFR  (NON AFRICAN AMERICAN): >60 ML/MIN/1.73 M^2
GLUCOSE SERPL-MCNC: 157 MG/DL (ref 70–110)
HCT VFR BLD AUTO: 37.9 % (ref 37–48.5)
HGB BLD-MCNC: 12.1 G/DL (ref 12–16)
IMM GRANULOCYTES # BLD AUTO: 0.12 K/UL (ref 0–0.04)
IMM GRANULOCYTES NFR BLD AUTO: 1.4 % (ref 0–0.5)
LYMPHOCYTES # BLD AUTO: 1 K/UL (ref 1–4.8)
LYMPHOCYTES NFR BLD: 11.8 % (ref 18–48)
MCH RBC QN AUTO: 29.2 PG (ref 27–31)
MCHC RBC AUTO-ENTMCNC: 31.9 G/DL (ref 32–36)
MCV RBC AUTO: 91 FL (ref 82–98)
MONOCYTES # BLD AUTO: 0.6 K/UL (ref 0.3–1)
MONOCYTES NFR BLD: 7.2 % (ref 4–15)
NEUTROPHILS # BLD AUTO: 6.7 K/UL (ref 1.8–7.7)
NEUTROPHILS NFR BLD: 78.1 % (ref 38–73)
NRBC BLD-RTO: 0 /100 WBC
PLATELET # BLD AUTO: 283 K/UL (ref 150–450)
PMV BLD AUTO: 10.3 FL (ref 9.2–12.9)
POTASSIUM SERPL-SCNC: 3.7 MMOL/L (ref 3.5–5.1)
PROT SERPL-MCNC: 6.6 G/DL (ref 6–8.4)
RBC # BLD AUTO: 4.15 M/UL (ref 4–5.4)
SODIUM SERPL-SCNC: 143 MMOL/L (ref 136–145)
WBC # BLD AUTO: 8.62 K/UL (ref 3.9–12.7)

## 2022-02-04 PROCEDURE — 36415 COLL VENOUS BLD VENIPUNCTURE: CPT | Performed by: INTERNAL MEDICINE

## 2022-02-04 PROCEDURE — 80053 COMPREHEN METABOLIC PANEL: CPT | Performed by: INTERNAL MEDICINE

## 2022-02-04 PROCEDURE — 83993 ASSAY FOR CALPROTECTIN FECAL: CPT | Performed by: INTERNAL MEDICINE

## 2022-02-04 PROCEDURE — 85025 COMPLETE CBC W/AUTO DIFF WBC: CPT | Performed by: INTERNAL MEDICINE

## 2022-02-11 ENCOUNTER — HOSPITAL ENCOUNTER (OUTPATIENT)
Dept: RADIOLOGY | Facility: OTHER | Age: 77
Discharge: HOME | End: 2022-02-11
Attending: NEUROLOGICAL SURGERY
Payer: MEDICARE

## 2022-02-11 ENCOUNTER — HOSPITAL ENCOUNTER (OUTPATIENT)
Dept: RADIOLOGY | Facility: OTHER | Age: 77
Discharge: HOME | End: 2022-02-11
Attending: INTERNAL MEDICINE
Payer: MEDICARE

## 2022-02-11 DIAGNOSIS — M47.816 LUMBAR SPONDYLOSIS: ICD-10-CM

## 2022-02-11 DIAGNOSIS — K57.30 DIVERTICULOSIS OF LARGE INTESTINE WITHOUT PERFORATION OR ABSCESS WITHOUT BLEEDING: ICD-10-CM

## 2022-02-11 DIAGNOSIS — K92.1 HEMATOCHEZIA: ICD-10-CM

## 2022-02-11 DIAGNOSIS — R19.7 DIARRHEA: ICD-10-CM

## 2022-02-11 DIAGNOSIS — M48.062 SPINAL STENOSIS OF LUMBAR REGION WITH NEUROGENIC CLAUDICATION: ICD-10-CM

## 2022-02-11 DIAGNOSIS — R10.9 LEFT SIDED ABDOMINAL PAIN: ICD-10-CM

## 2022-02-11 PROCEDURE — 72131 CT LUMBAR SPINE W/O DYE: CPT | Mod: TC

## 2022-02-11 PROCEDURE — 72131 CT LUMBAR SPINE WITHOUT CONTRAST: ICD-10-PCS | Mod: 26,,, | Performed by: RADIOLOGY

## 2022-02-11 PROCEDURE — 72131 CT LUMBAR SPINE W/O DYE: CPT | Mod: 26,,, | Performed by: RADIOLOGY

## 2022-02-11 PROCEDURE — 74176 CT ABD & PELVIS W/O CONTRAST: CPT | Mod: TC

## 2022-02-11 PROCEDURE — 74176 CT ABDOMEN PELVIS WITHOUT CONTRAST: ICD-10-PCS | Mod: 26,,, | Performed by: RADIOLOGY

## 2022-02-11 PROCEDURE — 74176 CT ABD & PELVIS W/O CONTRAST: CPT | Mod: 26,,, | Performed by: RADIOLOGY

## 2022-02-16 LAB — CALPROTECTIN STL-MCNT: 123.1 MCG/G

## 2022-02-23 DIAGNOSIS — D84.9 IMMUNOSUPPRESSED STATUS: ICD-10-CM

## 2022-02-28 ENCOUNTER — OFFICE VISIT (OUTPATIENT)
Dept: CARDIOLOGY | Facility: CLINIC | Age: 77
End: 2022-02-28
Payer: MEDICARE

## 2022-02-28 VITALS
DIASTOLIC BLOOD PRESSURE: 60 MMHG | HEART RATE: 91 BPM | BODY MASS INDEX: 31.64 KG/M2 | WEIGHT: 171.94 LBS | SYSTOLIC BLOOD PRESSURE: 134 MMHG | HEIGHT: 62 IN | OXYGEN SATURATION: 94 %

## 2022-02-28 DIAGNOSIS — R06.02 SHORTNESS OF BREATH: Primary | ICD-10-CM

## 2022-02-28 DIAGNOSIS — I49.1 PREMATURE ATRIAL COMPLEXES: ICD-10-CM

## 2022-02-28 DIAGNOSIS — I10 PRIMARY HYPERTENSION: ICD-10-CM

## 2022-02-28 DIAGNOSIS — K21.9 GASTROESOPHAGEAL REFLUX DISEASE, UNSPECIFIED WHETHER ESOPHAGITIS PRESENT: ICD-10-CM

## 2022-02-28 PROCEDURE — 1159F PR MEDICATION LIST DOCUMENTED IN MEDICAL RECORD: ICD-10-PCS | Mod: CPTII,S$GLB,, | Performed by: INTERNAL MEDICINE

## 2022-02-28 PROCEDURE — 3078F PR MOST RECENT DIASTOLIC BLOOD PRESSURE < 80 MM HG: ICD-10-PCS | Mod: CPTII,S$GLB,, | Performed by: INTERNAL MEDICINE

## 2022-02-28 PROCEDURE — 1101F PR PT FALLS ASSESS DOC 0-1 FALLS W/OUT INJ PAST YR: ICD-10-PCS | Mod: CPTII,S$GLB,, | Performed by: INTERNAL MEDICINE

## 2022-02-28 PROCEDURE — 99999 PR PBB SHADOW E&M-EST. PATIENT-LVL III: ICD-10-PCS | Mod: PBBFAC,,, | Performed by: INTERNAL MEDICINE

## 2022-02-28 PROCEDURE — 99214 OFFICE O/P EST MOD 30 MIN: CPT | Mod: S$GLB,,, | Performed by: INTERNAL MEDICINE

## 2022-02-28 PROCEDURE — 99999 PR PBB SHADOW E&M-EST. PATIENT-LVL III: CPT | Mod: PBBFAC,,, | Performed by: INTERNAL MEDICINE

## 2022-02-28 PROCEDURE — 99214 PR OFFICE/OUTPT VISIT, EST, LEVL IV, 30-39 MIN: ICD-10-PCS | Mod: S$GLB,,, | Performed by: INTERNAL MEDICINE

## 2022-02-28 PROCEDURE — 3288F PR FALLS RISK ASSESSMENT DOCUMENTED: ICD-10-PCS | Mod: CPTII,S$GLB,, | Performed by: INTERNAL MEDICINE

## 2022-02-28 PROCEDURE — 3075F SYST BP GE 130 - 139MM HG: CPT | Mod: CPTII,S$GLB,, | Performed by: INTERNAL MEDICINE

## 2022-02-28 PROCEDURE — 3075F PR MOST RECENT SYSTOLIC BLOOD PRESS GE 130-139MM HG: ICD-10-PCS | Mod: CPTII,S$GLB,, | Performed by: INTERNAL MEDICINE

## 2022-02-28 PROCEDURE — 3288F FALL RISK ASSESSMENT DOCD: CPT | Mod: CPTII,S$GLB,, | Performed by: INTERNAL MEDICINE

## 2022-02-28 PROCEDURE — 1126F PR PAIN SEVERITY QUANTIFIED, NO PAIN PRESENT: ICD-10-PCS | Mod: CPTII,S$GLB,, | Performed by: INTERNAL MEDICINE

## 2022-02-28 PROCEDURE — 3078F DIAST BP <80 MM HG: CPT | Mod: CPTII,S$GLB,, | Performed by: INTERNAL MEDICINE

## 2022-02-28 PROCEDURE — 1101F PT FALLS ASSESS-DOCD LE1/YR: CPT | Mod: CPTII,S$GLB,, | Performed by: INTERNAL MEDICINE

## 2022-02-28 PROCEDURE — 1159F MED LIST DOCD IN RCRD: CPT | Mod: CPTII,S$GLB,, | Performed by: INTERNAL MEDICINE

## 2022-02-28 PROCEDURE — 1126F AMNT PAIN NOTED NONE PRSNT: CPT | Mod: CPTII,S$GLB,, | Performed by: INTERNAL MEDICINE

## 2022-03-11 NOTE — PROGRESS NOTES
Subjective:   Chief Complaint: Shortness of Breath and Leg Swelling  Last Clinic Visit: 5/24/2021      History of Present Illness: Joeclyne Duncan is a 76 y.o. lady Armenian-speaking from [place], but speaking English fluently, with hypertension, hyperlipidemia, rheumatoid arthritis on DMARD/steroid, in PACs, who presents to follow-up with cardiology.    Interval History:  We recommended PFTs when we saw her last given methotrexate use and rheumatoid arthritis diagnosis, but not yet obtained.  She was minimally symptomatic from her PACs at that time, not interested in therapy.  Does endorse some mild lower extremity edema.  We refilled her inhaled corticosteroid last time which she has been using, reports that her shortness of breath has worsened some and ask about increasing the medication, but she has not yet seen anyone in the pulmonary department, or addressed with PCP.  Does endorse some difficulty sleeping, no profound weight gain.  Outside rheumatologist.  Recent labs with normal renal function, grossly normal electrolytes, and no significant anemia.    5/24/2021  When we saw her last she was having significant shortness of breath, multifactorial etiology, we obtained an echocardiogram, she had preserved EF, some aortic sclerosis,but no significant stenosis.  She had significant PACs, but was not interested in management at that time for these.  With her history of rheumatoid arthritis, and methotrexate use, recommended discussing PFTs with Rheumatology, not yet performed.    11/9/2020    She recently saw her PCP who noted irregular heartbeats, and referred to Cardiology.  She has a history of PACs longstanding, extensive workup at outside hospital, without any evidence of AFib a flutter, and no evidence of congestive heart failure or underlying structural heart disease.  She saw Cardiology here last year for similar issues.  She denies any change in her palpitations, irregular, at rest, no associated  chest pain or shortness of breath with palpitations, typically happen once a week, and resolved spontaneously.  No sustained periods of tachycardia, no focal neurological deficits.  She has a new complaint of worsening shortness of breath over the course the past year PCP is also ordered a chest x-ray, occasional wheezing, no clear orthopnea, no clear weight gain, no PND.  She does however have some difficulty with sleeping.  She reports last echocardiogram was many years ago, unclear if was done at outside facility.   is from [place], he also would like to be referred to us for issues it sounds like with atrial fibrillation and DVTs.  She is not currently on Lasix.  Questionable diagnosis of COPD.  Palpitations are not severe enough to where she would want any medication or procedural therapy for them.    Dx:  Hypertension  Hyperlipidemia  Osteoarthritis  Rheumatoid arthritis on DMARD/steroid  PACs    Medications:  Outpatient Encounter Medications as of 2/28/2022   Medication Sig Dispense Refill    amLODIPine (NORVASC) 5 MG tablet Take 5 mg by mouth once daily.       atorvastatin (LIPITOR) 20 MG tablet Take 20 mg by mouth once daily.       BREO ELLIPTA 100-25 mcg/dose diskus inhaler INHALE 1 PUFF INTO THE LUNGS EVERY DAY 60 each 11    diclofenac (VOLTAREN) 75 MG EC tablet Take 75 mg by mouth 2 (two) times daily.      esomeprazole (NEXIUM) 40 MG capsule Take 40 mg by mouth once daily.      folic acid (FOLVITE) 800 MCG Tab Take 800 mcg by mouth once daily.      furosemide (LASIX) 20 MG tablet Take 20 mg by mouth once daily.       lisinopril-hydrochlorothiazide (PRINZIDE,ZESTORETIC) 20-12.5 mg per tablet Take 1 tablet by mouth once daily.       methocarbamoL (ROBAXIN) 500 MG Tab Take 500 mg by mouth 3 (three) times daily as needed.      methotrexate 2.5 MG Tab every 7 days.       nystatin (MYCOSTATIN) cream Apply topically as needed.      predniSONE (DELTASONE) 1 MG tablet Take 1 mg by mouth 2  "(two) times daily.       vitamin D3-folic acid 5,000 unit- 1 mg Tab Take 1 tablet by mouth once daily.      [DISCONTINUED] Al hyd-Mg tr-alg ac-sod bicarb 80-20 mg Chew Take 1 tablet by mouth once daily.       [DISCONTINUED] gabapentin (NEURONTIN) 300 MG capsule Take 300 mg by mouth 3 (three) times daily.      [DISCONTINUED] valACYclovir (VALTREX) 500 MG tablet Take 1 tablet (500 mg total) by mouth 2 (two) times daily. for 5 days 10 tablet 4     No facility-administered encounter medications on file as of 2/28/2022.     Social History:  Jocelyne reports that she has never smoked. She has never used smokeless tobacco. She reports current alcohol use. She reports that she does not use drugs.    Objective:   /60   Pulse 91   Ht 5' 2" (1.575 m)   Wt 78 kg (171 lb 15.3 oz)   LMP  (LMP Unknown)   SpO2 (!) 94%   BMI 31.45 kg/m²     Physical Exam  Constitutional:       General: She is not in acute distress.     Appearance: She is not diaphoretic.   HENT:      Head: Normocephalic and atraumatic.      Mouth/Throat:      Pharynx: No oropharyngeal exudate.   Eyes:      General: No scleral icterus.  Neck:      Thyroid: No thyromegaly.      Vascular: No JVD.      Trachea: No tracheal deviation.   Cardiovascular:      Rate and Rhythm: Normal rate.      Heart sounds: Murmur (One/6 systolic) heard.     No friction rub. No gallop.      Comments: Occasional ectopic beat, otherwise regular R-R interval.  Pulmonary:      Effort: Pulmonary effort is normal. No respiratory distress.      Breath sounds: Normal breath sounds. No wheezing or rales.   Chest:      Chest wall: No tenderness.   Abdominal:      General: There is no distension.      Palpations: Abdomen is soft.      Tenderness: There is no abdominal tenderness. There is no guarding or rebound.   Musculoskeletal:         General: Normal range of motion.   Skin:     General: Skin is warm and dry.      Findings: No erythema.   Neurological:      Mental Status: She is alert " and oriented to person, place, and time.   Psychiatric:         Mood and Affect: Affect normal.       EKG:  My independent visualization of most recent EKG is normal sinus rhythm with PACs    TTE:  12/11/2020  · The left ventricle is normal in size with normal systolic function. The estimated ejection fraction is 65%.  · Grade I diastolic dysfunction.  · Mild right ventricular enlargement with low normal right ventricular systolic function.  · Aortic annular calcification. Aortic sclerosis without stenosis.  · Mild to moderate tricuspid regurgitation.  · The estimated PA systolic pressure is 39 mmHg.  · Normal central venous pressure (3 mmHg).     Lipids:       Renal:  Recent Labs   Lab 02/04/22  1405   Creatinine 0.8   Potassium 3.7   CO2 24   BUN 15     Liver:  Recent Labs   Lab 02/04/22  1405   AST 17   ALT 23     Assessment:     1. Shortness of breath    2. Primary hypertension    3. Premature atrial complexes    4. Gastroesophageal reflux disease, unspecified whether esophagitis present      Plan:   1. Shortness of breath  Again with her symptoms improved with inhaled corticosteroid, along with diagnosis of rheumatoid arthritis strongly encouraged her to obtain PFTs again, will reorder, will also update echocardiogram to ensure no worsening valvular disease.  Overall would recommend that she continue to discuss with either Rheumatology or pulmonology, as unclear that symptoms are cardiac at this time, with other potential pulmonary etiologies.  No gross evidence of volume overload at this time.  - Complete PFT w/o bronchodilator; Future  - Echo; Future    2. Primary hypertension  Most recent blood pressure near goal, continue amlodipine 5.     3. Premature atrial complexes  Minimally symptomatic.    4. Gastroesophageal reflux disease, unspecified whether esophagitis present        Follow up in 6 months      Parish Martell MD [facility]

## 2022-03-14 ENCOUNTER — HOSPITAL ENCOUNTER (OUTPATIENT)
Dept: CARDIOLOGY | Facility: HOSPITAL | Age: 77
Discharge: HOME | End: 2022-03-14
Attending: INTERNAL MEDICINE
Payer: MEDICARE

## 2022-03-14 ENCOUNTER — HOSPITAL ENCOUNTER (OUTPATIENT)
Dept: PULMONOLOGY | Facility: CLINIC | Age: 77
Discharge: HOME | End: 2022-03-14
Payer: MEDICARE

## 2022-03-14 VITALS
HEART RATE: 68 BPM | BODY MASS INDEX: 31.47 KG/M2 | WEIGHT: 171 LBS | SYSTOLIC BLOOD PRESSURE: 134 MMHG | HEIGHT: 62 IN | DIASTOLIC BLOOD PRESSURE: 60 MMHG

## 2022-03-14 DIAGNOSIS — R06.02 SHORTNESS OF BREATH: ICD-10-CM

## 2022-03-14 LAB
ASCENDING AORTA: 2.81 CM
AV INDEX (PROSTH): 0.7
AV MEAN GRADIENT: 5 MMHG
AV PEAK GRADIENT: 12 MMHG
AV VALVE AREA: 1.95 CM2
AV VELOCITY RATIO: 0.66
BSA FOR ECHO PROCEDURE: 1.84 M2
CV ECHO LV RWT: 0.38 CM
DOP CALC AO PEAK VEL: 1.7 M/S
DOP CALC AO VTI: 30.05 CM
DOP CALC LVOT AREA: 2.8 CM2
DOP CALC LVOT DIAMETER: 1.88 CM
DOP CALC LVOT PEAK VEL: 1.13 M/S
DOP CALC LVOT STROKE VOLUME: 58.51 CM3
DOP CALCLVOT PEAK VEL VTI: 21.09 CM
E WAVE DECELERATION TIME: 211.07 MSEC
E/A RATIO: 0.63
E/E' RATIO: 7.75 M/S
ECHO LV POSTERIOR WALL: 0.82 CM (ref 0.6–1.1)
EJECTION FRACTION: 65 %
FRACTIONAL SHORTENING: 35 % (ref 28–44)
INTERVENTRICULAR SEPTUM: 0.66 CM (ref 0.6–1.1)
IVRT: 85.63 MSEC
LA MAJOR: 4.97 CM
LA MINOR: 5.09 CM
LA WIDTH: 3.99 CM
LEFT ATRIUM SIZE: 2.88 CM
LEFT ATRIUM VOLUME INDEX MOD: 33 ML/M2
LEFT ATRIUM VOLUME INDEX: 27.4 ML/M2
LEFT ATRIUM VOLUME MOD: 59 CM3
LEFT ATRIUM VOLUME: 49.12 CM3
LEFT INTERNAL DIMENSION IN SYSTOLE: 2.77 CM (ref 2.1–4)
LEFT VENTRICLE DIASTOLIC VOLUME INDEX: 45.64 ML/M2
LEFT VENTRICLE DIASTOLIC VOLUME: 81.7 ML
LEFT VENTRICLE MASS INDEX: 53 G/M2
LEFT VENTRICLE SYSTOLIC VOLUME INDEX: 16 ML/M2
LEFT VENTRICLE SYSTOLIC VOLUME: 28.69 ML
LEFT VENTRICULAR INTERNAL DIMENSION IN DIASTOLE: 4.27 CM (ref 3.5–6)
LEFT VENTRICULAR MASS: 94 G
LV LATERAL E/E' RATIO: 6.2 M/S
LV SEPTAL E/E' RATIO: 10.33 M/S
MV A" WAVE DURATION": 7.14 MSEC
MV PEAK A VEL: 0.99 M/S
MV PEAK E VEL: 0.62 M/S
MV STENOSIS PRESSURE HALF TIME: 61.21 MS
MV VALVE AREA P 1/2 METHOD: 3.59 CM2
PISA TR MAX VEL: 2.85 M/S
PULM VEIN S/D RATIO: 2.43
PV PEAK D VEL: 0.23 M/S
PV PEAK S VEL: 0.56 M/S
RA MAJOR: 5.07 CM
RA PRESSURE: 3 MMHG
RA WIDTH: 3.84 CM
RIGHT VENTRICULAR END-DIASTOLIC DIMENSION: 3.39 CM
RV TISSUE DOPPLER FREE WALL SYSTOLIC VELOCITY 1 (APICAL 4 CHAMBER VIEW): 14.62 CM/S
SINUS: 2.54 CM
STJ: 2.3 CM
TDI LATERAL: 0.1 M/S
TDI SEPTAL: 0.06 M/S
TDI: 0.08 M/S
TR MAX PG: 32 MMHG
TRICUSPID ANNULAR PLANE SYSTOLIC EXCURSION: 1.64 CM
TV REST PULMONARY ARTERY PRESSURE: 35 MMHG

## 2022-03-14 PROCEDURE — 93306 TTE W/DOPPLER COMPLETE: CPT

## 2022-03-14 PROCEDURE — 94010 BREATHING CAPACITY TEST: CPT | Mod: S$GLB,,, | Performed by: INTERNAL MEDICINE

## 2022-03-14 PROCEDURE — 94010 BREATHING CAPACITY TEST: ICD-10-PCS | Mod: S$GLB,,, | Performed by: INTERNAL MEDICINE

## 2022-03-14 PROCEDURE — 94727 PR PULM FUNCTION TEST BY GAS: ICD-10-PCS | Mod: S$GLB,,, | Performed by: INTERNAL MEDICINE

## 2022-03-14 PROCEDURE — 94727 GAS DIL/WSHOT DETER LNG VOL: CPT | Mod: S$GLB,,, | Performed by: INTERNAL MEDICINE

## 2022-03-14 PROCEDURE — 94729 DIFFUSING CAPACITY: CPT | Mod: S$GLB,,, | Performed by: INTERNAL MEDICINE

## 2022-03-14 PROCEDURE — 94729 PR C02/MEMBANE DIFFUSE CAPACITY: ICD-10-PCS | Mod: S$GLB,,, | Performed by: INTERNAL MEDICINE

## 2022-03-14 PROCEDURE — 93306 TTE W/DOPPLER COMPLETE: CPT | Mod: 26,,, | Performed by: INTERNAL MEDICINE

## 2022-03-14 PROCEDURE — 93306 ECHO (CUPID ONLY): ICD-10-PCS | Mod: 26,,, | Performed by: INTERNAL MEDICINE

## 2022-03-15 NOTE — PROGRESS NOTES
Called patient, explained pulmonary function test with only mild restriction, mildly decreased DLCO.  Echocardiogram with normal LV function, only mildly enlarged left atrial volume index, no report of significant diastolic dysfunction, and all valve function grossly normal.    Thus no obvious etiology for shortness of breath recommended that she continue exercising on the bicycle on able to walk on a daily basis.    Recommended she discuss with her PCP alternative etiologies for shortness of breath if nothing obvious can consider stress testing however low on differential.    -Parish Martell

## 2022-04-14 ENCOUNTER — HOSPITAL ENCOUNTER (EMERGENCY)
Facility: HOSPITAL | Age: 77
Discharge: HOME | End: 2022-04-14
Attending: EMERGENCY MEDICINE
Payer: MEDICARE

## 2022-04-14 VITALS
DIASTOLIC BLOOD PRESSURE: 80 MMHG | BODY MASS INDEX: 31.28 KG/M2 | TEMPERATURE: 99 F | OXYGEN SATURATION: 96 % | WEIGHT: 170 LBS | HEART RATE: 80 BPM | HEIGHT: 62 IN | RESPIRATION RATE: 18 BRPM | SYSTOLIC BLOOD PRESSURE: 180 MMHG

## 2022-04-14 DIAGNOSIS — M79.89 RIGHT LEG SWELLING: ICD-10-CM

## 2022-04-14 DIAGNOSIS — S80.01XA HEMATOMA OF RIGHT KNEE REGION: Primary | ICD-10-CM

## 2022-04-14 PROCEDURE — 99284 EMERGENCY DEPT VISIT MOD MDM: CPT | Mod: 25,,, | Performed by: EMERGENCY MEDICINE

## 2022-04-14 PROCEDURE — 99283 EMERGENCY DEPT VISIT LOW MDM: CPT | Mod: 25

## 2022-04-14 PROCEDURE — 99284 PR EMERGENCY DEPT VISIT,LEVEL IV: ICD-10-PCS | Mod: 25,,, | Performed by: EMERGENCY MEDICINE

## 2022-04-14 PROCEDURE — 10060 I&D ABSCESS SIMPLE/SINGLE: CPT

## 2022-04-14 PROCEDURE — 10060 I&D ABSCESS SIMPLE/SINGLE: CPT | Mod: ,,, | Performed by: EMERGENCY MEDICINE

## 2022-04-14 PROCEDURE — 25000003 PHARM REV CODE 250: Performed by: EMERGENCY MEDICINE

## 2022-04-14 PROCEDURE — 10060 PR DRAIN SKIN ABSCESS SIMPLE: ICD-10-PCS | Mod: ,,, | Performed by: EMERGENCY MEDICINE

## 2022-04-14 RX ORDER — LIDOCAINE HYDROCHLORIDE 10 MG/ML
INJECTION INFILTRATION; PERINEURAL
Status: DISCONTINUED
Start: 2022-04-14 | End: 2022-04-15 | Stop reason: HOSPADM

## 2022-04-14 RX ORDER — LIDOCAINE HYDROCHLORIDE 10 MG/ML
5 INJECTION, SOLUTION EPIDURAL; INFILTRATION; INTRACAUDAL; PERINEURAL
Status: COMPLETED | OUTPATIENT
Start: 2022-04-14 | End: 2022-04-14

## 2022-04-14 RX ORDER — LIDOCAINE HYDROCHLORIDE AND EPINEPHRINE 10; 10 MG/ML; UG/ML
1 INJECTION, SOLUTION INFILTRATION; PERINEURAL ONCE
Status: DISCONTINUED | OUTPATIENT
Start: 2022-04-14 | End: 2022-04-15 | Stop reason: HOSPADM

## 2022-04-14 RX ORDER — DOXYCYCLINE 100 MG/1
100 CAPSULE ORAL 2 TIMES DAILY
Qty: 14 CAPSULE | Refills: 0 | Status: SHIPPED | OUTPATIENT
Start: 2022-04-14 | End: 2022-04-20

## 2022-04-14 RX ADMIN — LIDOCAINE HYDROCHLORIDE 50 MG: 10 INJECTION, SOLUTION EPIDURAL; INFILTRATION; INTRACAUDAL; PERINEURAL at 09:04

## 2022-04-14 NOTE — ED PROVIDER NOTES
Encounter Date: 4/14/2022       History     Chief Complaint   Patient presents with    Knee Pain    Fall     Fall a few weeks ago here for right knee swelling     HPI   Jocelyne is 77-year-old female with past medical history of hypertension, rheumatoid arthritis on methotrexate and steroids presents complaint right knee pain and swelling and some swelling in right leg.  The swelling has been progressing since a fall several weeks ago.  She was seen a week and half ago at another hospital and x-rays done which were negative.  She had a V/Q scan of her chest done at that time as well because she had chest pain, this was negative for evidence of pulmonary embolism.  She tells me that she thinks she also had an ultrasound of leg done at that time.  She denies any chest pain at this time.  Denies any trouble breathing.  She has had some redness around the area of swelling in the right leg and the pain has been getting worse.  She denies fever.  She is able to walk ok.  She went to see her primary care doctor who recommended that she come to the emergency department because of the swelling around her right knee, they were concerned that it could be an abscess.  Review of patient's allergies indicates:   Allergen Reactions    Iodine      Swelling (throat)^  Unsure of what drug/food triggered the reaction      Adhesive Itching    Penicillins      Past Medical History:   Diagnosis Date    Arthritis     GERD (gastroesophageal reflux disease)     HTN (hypertension)     Premature atrial complexes     Rheumatoid arthritis      Past Surgical History:   Procedure Laterality Date    LUMBAR SPINE SURGERY       Family History   Problem Relation Age of Onset    Breast cancer Neg Hx     Colon cancer Neg Hx     Ovarian cancer Neg Hx      Social History     Tobacco Use    Smoking status: Never Smoker    Smokeless tobacco: Never Used   Substance Use Topics    Alcohol use: Yes     Comment: occ    Drug use: Never     Review of  Systems  Constitutional: Denies fever  HENT: Denies sore throat  Eyes: Denies eye pain  Respiratory: Denies shortness of breath  CV: Denies chest pain  GI: Denies abdominal pain  MSK: + R knee pain  Skin: + R knee abrasion and drainage  Neuro: Denies headache    Physical Exam     Initial Vitals [04/14/22 1521]   BP Pulse Resp Temp SpO2   (!) 164/67 89 18 97.3 °F (36.3 °C) 95 %      MAP       --         Physical Exam  General: Awake and alert, well-nourished  HENT: moist mucous membranes  Eyes: No conjunctival injection  Pulm: CTAB, no increased work of breathing  CV: Regular rate and rhythm, no murmur noted  Abdomen: Nondistended, non-tender to palpation  MSK: + RLE edema and bruising around R knee.   Fluctuance below R knee over area under the abrasion, just lateral and inferior to the patella..  Minimal pain with ROM of R knee.  The swelling seems to be superficial rather than in the knee joint.  Skin: Mild erythema near area of abrasion, no drainage or purulence noted  Neuro: No facial asymmetry, grossly normal movements of arms and legs  Psychiatric: Cooperative    ED Course   I & D - Incision and Drainage    Date/Time: 4/14/2022 9:00 PM  Location procedure was performed: [facility] EMERGENCY DEPARTMENT  Performed by: Avinash Romero MD  Authorized by: Avinash Romero MD   Consent Done: Yes  Consent: Verbal consent obtained.  Risks and benefits: risks, benefits and alternatives were discussed  Type: abscess  Body area: lower extremity  Location details: right leg  Anesthesia: local infiltration    Anesthesia:  Local Anesthetic: lidocaine 2% without epinephrine  Anesthetic total: 4 mL    Patient sedated: no  Scalpel size: 11  Incision type: single straight  Complexity: simple  Drainage: bloody  Drainage amount: moderate  Wound treatment: incision,  drainage,  clot removal and  wound packed  Packing material: 1/2 in gauze  Estimated blood loss (mL): 10  Patient tolerance: Patient tolerated the procedure well with  no immediate complications        Labs Reviewed - No data to display       Imaging Results    None          Medications   LIDOcaine (PF) 10 mg/ml (1%) injection 50 mg (50 mg Infiltration Given by Provider 4/14/22 2125)     Medical Decision Making:   Initial Assessment:   Overall patient is well-appearing and nontoxic.  Her right knee seems to have a superficial abscess versus hematoma.  She was reporting drainage of fluid and blood from the abrasion recently.  I placed a bedside ultrasound and there is significant accumulation of compressible fluid with internal echogenicity.  Consistent with hematoma versus abscess.  Given the size and overlying erythema I am concerned for possible abscess, particularly given that she has immunosuppressed condition due to her medications for rheumatoid arthritis.  Symptoms have been worsening instead of improving.  Given this will plan to do incision and drainage.  Differential Diagnosis:   Abscess, hematoma, DVT, cellulitis, necrotizing fasciitis unlikely, septic arthritis unlikely  ED Management:  Incision and drainage performed.  Based on findings from incision and drainage this appears to be primarily a hematoma.  The clotted blood was expressed and the incision was washed out with 1 L of sterile saline.  Packing was placed.  Will treat as if it is an abscess given the size of wound and risk for infection, will likely need replacement of packing given that it will take some time to heal because of the size.  Gave prescription for doxycycline and recommended return to the emergency department in 2 days for packing replacement and wound check and follow-up PCP on Monday.  Gave return precautions and discharged in stable condition.                      Clinical Impression:   Final diagnoses:  [M79.89] Right leg swelling  [S80.01XA] Hematoma of right knee region (Primary)          ED Disposition Condition    Discharge Stable        ED Prescriptions     Medication Sig Dispense Start  Date End Date Auth. Provider    doxycycline (VIBRAMYCIN) 100 MG Cap Take 1 capsule (100 mg total) by mouth 2 (two) times daily. for 7 days 14 capsule 4/14/2022 4/21/2022 Avinash Romero MD        Follow-up Information     Follow up With Specialties Details Why Contact Info    [facility] In 4 days  [address] [facility] [address]  [phone]          Return to the emergency room in 2 days for a wound check and to get packing removed, they can decide whether to replace the packing or not then.           Avinash Romero MD  04/16/22 [id number]

## 2022-04-14 NOTE — ED TRIAGE NOTES
Jocelyne Duncan, a 77 y.o. female presents to the ED w/ complaint of R knee swelling. Pt states that she fell a few weeks ago onto R knee and was seen at [facility] at that time; was then d/c'ed and set up with a follow-up appt.Today at the follow-up appt, the doctor told her to go to the ED because her R knee has increased in size due to swelling and is also producing yellow and bloody exudate. Pt states that she is unable to bear weight on her RLE and the pain has become unbearable. Pt denies n/v/d, constipation, HA, SOB, fever/chills, abd pain.     Triage note:  Chief Complaint   Patient presents with    Knee Pain    Fall     Fall a few weeks ago here for right knee swelling     Review of patient's allergies indicates:   Allergen Reactions    Iodine      Swelling (throat)^  Unsure of what drug/food triggered the reaction      Adhesive Itching    Penicillins      Past Medical History:   Diagnosis Date    Arthritis     GERD (gastroesophageal reflux disease)     HTN (hypertension)     Premature atrial complexes     Rheumatoid arthritis      LOC: The patient is awake, alert, and oriented to self, place, time, and situation. Pt is calm and cooperative. Affect is appropriate.  Speech is appropriate and clear.     APPEARANCE: Patient resting comfortably in no acute distress.  Patient is clean and well groomed.    SKIN: The skin is warm and dry; color consistent with ethnicity.  Patient has normal skin turgor and moist mucus membranes.  Erythema and bruising present to RLE (knee all the way down to foot). Blister present to R knee and yellow and bloody exudate noted at wound.     MUSCULOSKELETAL: Patient moving RLE with difficulty; reports pain in RLE.  Denies weakness.     RESPIRATORY: Airway is open and patent. Respirations spontaneous, even, easy, and non-labored.  Patient has a normal effort and rate.  No accessory muscle use noted. Denies cough.     CARDIAC:  Normal rate noted.  No peripheral edema noted. No  complaints of chest pain.      ABDOMEN: Soft and non tender to palpation.  No distention noted. Pt denies abdominal pain; denies nausea, vomiting, diarrhea, or constipation.    NEUROLOGIC: Eyes open spontaneously.  Behavior appropriate to situation.  Follows commands; facial expression symmetrical.  Purposeful motor response noted; normal sensation in all extremities. Pt denies headache; denies lightheadedness or dizziness; denies visual disturbances; reports loss of balance; denies unilateral weakness.

## 2022-04-15 NOTE — DISCHARGE INSTRUCTIONS
You had a large blood clot in your knee.  I am giving antibiotics to try and prevent infection.  Return to the emergency department if you have fever, increasing pain, spreading redness, discharge of pus or any other new concerning symptoms.

## 2022-04-16 ENCOUNTER — HOSPITAL ENCOUNTER (EMERGENCY)
Facility: HOSPITAL | Age: 77
Discharge: HOME | End: 2022-04-16
Attending: EMERGENCY MEDICINE
Payer: MEDICARE

## 2022-04-16 VITALS
WEIGHT: 172 LBS | HEART RATE: 69 BPM | BODY MASS INDEX: 31.65 KG/M2 | HEIGHT: 62 IN | OXYGEN SATURATION: 98 % | DIASTOLIC BLOOD PRESSURE: 85 MMHG | SYSTOLIC BLOOD PRESSURE: 138 MMHG | RESPIRATION RATE: 18 BRPM | TEMPERATURE: 100 F

## 2022-04-16 DIAGNOSIS — S80.01XD TRAUMATIC HEMATOMA OF RIGHT KNEE, SUBSEQUENT ENCOUNTER: Primary | ICD-10-CM

## 2022-04-16 DIAGNOSIS — S81.001A OPEN KNEE WOUND, RIGHT, INITIAL ENCOUNTER: ICD-10-CM

## 2022-04-16 LAB
ALBUMIN SERPL BCP-MCNC: 3.8 G/DL (ref 3.5–5.2)
ALP SERPL-CCNC: 68 U/L (ref 55–135)
ALT SERPL W/O P-5'-P-CCNC: 26 U/L (ref 10–44)
ANION GAP SERPL CALC-SCNC: 11 MMOL/L (ref 8–16)
AST SERPL-CCNC: 17 U/L (ref 10–40)
BASOPHILS # BLD AUTO: 0.02 K/UL (ref 0–0.2)
BASOPHILS NFR BLD: 0.2 % (ref 0–1.9)
BILIRUB SERPL-MCNC: 0.7 MG/DL (ref 0.1–1)
BUN SERPL-MCNC: 18 MG/DL (ref 8–23)
CALCIUM SERPL-MCNC: 8.7 MG/DL (ref 8.7–10.5)
CHLORIDE SERPL-SCNC: 104 MMOL/L (ref 95–110)
CO2 SERPL-SCNC: 25 MMOL/L (ref 23–29)
CREAT SERPL-MCNC: 0.8 MG/DL (ref 0.5–1.4)
CRP SERPL-MCNC: 84.2 MG/L (ref 0–8.2)
DIFFERENTIAL METHOD: ABNORMAL
EOSINOPHIL # BLD AUTO: 0 K/UL (ref 0–0.5)
EOSINOPHIL NFR BLD: 0.1 % (ref 0–8)
ERYTHROCYTE [DISTWIDTH] IN BLOOD BY AUTOMATED COUNT: 17.9 % (ref 11.5–14.5)
ERYTHROCYTE [SEDIMENTATION RATE] IN BLOOD BY WESTERGREN METHOD: 20 MM/HR (ref 0–36)
EST. GFR  (AFRICAN AMERICAN): >60 ML/MIN/1.73 M^2
EST. GFR  (NON AFRICAN AMERICAN): >60 ML/MIN/1.73 M^2
GLUCOSE SERPL-MCNC: 163 MG/DL (ref 70–110)
HCT VFR BLD AUTO: 33.7 % (ref 37–48.5)
HGB BLD-MCNC: 10.6 G/DL (ref 12–16)
IMM GRANULOCYTES # BLD AUTO: 0.12 K/UL (ref 0–0.04)
IMM GRANULOCYTES NFR BLD AUTO: 1.1 % (ref 0–0.5)
LYMPHOCYTES # BLD AUTO: 0.9 K/UL (ref 1–4.8)
LYMPHOCYTES NFR BLD: 8.3 % (ref 18–48)
MCH RBC QN AUTO: 29 PG (ref 27–31)
MCHC RBC AUTO-ENTMCNC: 31.5 G/DL (ref 32–36)
MCV RBC AUTO: 92 FL (ref 82–98)
MONOCYTES # BLD AUTO: 0.7 K/UL (ref 0.3–1)
MONOCYTES NFR BLD: 6.5 % (ref 4–15)
NEUTROPHILS # BLD AUTO: 8.9 K/UL (ref 1.8–7.7)
NEUTROPHILS NFR BLD: 83.8 % (ref 38–73)
NRBC BLD-RTO: 0 /100 WBC
PLATELET # BLD AUTO: 267 K/UL (ref 150–450)
PMV BLD AUTO: 9.6 FL (ref 9.2–12.9)
POTASSIUM SERPL-SCNC: 4.1 MMOL/L (ref 3.5–5.1)
PROT SERPL-MCNC: 6.6 G/DL (ref 6–8.4)
RBC # BLD AUTO: 3.65 M/UL (ref 4–5.4)
SODIUM SERPL-SCNC: 140 MMOL/L (ref 136–145)
WBC # BLD AUTO: 10.61 K/UL (ref 3.9–12.7)

## 2022-04-16 PROCEDURE — 99284 PR EMERGENCY DEPT VISIT,LEVEL IV: ICD-10-PCS | Mod: 25,,, | Performed by: EMERGENCY MEDICINE

## 2022-04-16 PROCEDURE — 10140 PR DRAINAGE OF HEMATOMA/FLUID: ICD-10-PCS | Mod: RT,,, | Performed by: EMERGENCY MEDICINE

## 2022-04-16 PROCEDURE — 86140 C-REACTIVE PROTEIN: CPT | Performed by: EMERGENCY MEDICINE

## 2022-04-16 PROCEDURE — 10140 I&D HMTMA SEROMA/FLUID COLLJ: CPT | Mod: RT,,, | Performed by: EMERGENCY MEDICINE

## 2022-04-16 PROCEDURE — 99284 EMERGENCY DEPT VISIT MOD MDM: CPT | Mod: 25

## 2022-04-16 PROCEDURE — 85652 RBC SED RATE AUTOMATED: CPT | Performed by: EMERGENCY MEDICINE

## 2022-04-16 PROCEDURE — 99284 EMERGENCY DEPT VISIT MOD MDM: CPT | Mod: 25,,, | Performed by: EMERGENCY MEDICINE

## 2022-04-16 PROCEDURE — 80053 COMPREHEN METABOLIC PANEL: CPT | Performed by: EMERGENCY MEDICINE

## 2022-04-16 PROCEDURE — 25000003 PHARM REV CODE 250: Performed by: EMERGENCY MEDICINE

## 2022-04-16 PROCEDURE — 85025 COMPLETE CBC W/AUTO DIFF WBC: CPT | Performed by: EMERGENCY MEDICINE

## 2022-04-16 PROCEDURE — 10060 I&D ABSCESS SIMPLE/SINGLE: CPT | Mod: RT

## 2022-04-16 RX ORDER — LIDOCAINE HYDROCHLORIDE AND EPINEPHRINE 20; 10 MG/ML; UG/ML
10 INJECTION, SOLUTION INFILTRATION; PERINEURAL ONCE
Status: COMPLETED | OUTPATIENT
Start: 2022-04-16 | End: 2022-04-16

## 2022-04-16 RX ORDER — BACITRACIN ZINC 500 [USP'U]/G
1 OINTMENT TOPICAL
Status: COMPLETED | OUTPATIENT
Start: 2022-04-16 | End: 2022-04-16

## 2022-04-16 RX ADMIN — BACITRACIN 1 EACH: 500 OINTMENT TOPICAL at 01:04

## 2022-04-16 RX ADMIN — Medication: at 02:04

## 2022-04-16 RX ADMIN — LIDOCAINE HYDROCHLORIDE,EPINEPHRINE BITARTRATE 10 ML: 20; .01 INJECTION, SOLUTION INFILTRATION; PERINEURAL at 02:04

## 2022-04-16 NOTE — ED PROVIDER NOTES
Encounter Date: 4/16/2022    SCRIBE #1 NOTE: I, Sneha Ny, am scribing for, and in the presence of,  Kya Zacarias MD. I have scribed the following portions of the note - Other sections scribed: HPI, ROS, PE, MDM.       History     Chief Complaint   Patient presents with    Wound Infection     Told to come in for follow up of packing to r leg     Time patient was seen by the provider: 10:53 AM      The patient is a 77 y.o. female with PMHx including: GERD, HTN, rheumatoid arthritis who presents to the ED with a complaint of redness and swelling of right knee. Patient scraped her right knee and noticed increasing redness and swelling. She came to ED 2 days ago for drainage and packing of potential hematoma on the right knee and was told to return for follow up. She says that the redness and swelling has gone down but is still present along with bruising on the right thigh. She also reports that the area feels hot. She has good ROM. She has been taking doxycycline as prescribed. No fever, chills, or nausea.     The history is provided by the patient, the spouse and medical records. No  was used.     Review of patient's allergies indicates:   Allergen Reactions    Iodine      Swelling (throat)^  Unsure of what drug/food triggered the reaction      Adhesive Itching    Penicillins      Past Medical History:   Diagnosis Date    Arthritis     GERD (gastroesophageal reflux disease)     HTN (hypertension)     Premature atrial complexes     Rheumatoid arthritis      Past Surgical History:   Procedure Laterality Date    LUMBAR SPINE SURGERY       Family History   Problem Relation Age of Onset    Breast cancer Neg Hx     Colon cancer Neg Hx     Ovarian cancer Neg Hx      Social History     Tobacco Use    Smoking status: Never Smoker    Smokeless tobacco: Never Used   Substance Use Topics    Alcohol use: Yes     Comment: occ    Drug use: Never     Review of Systems    Constitutional: Negative for chills and fever.   Respiratory: Negative for cough and shortness of breath.    Cardiovascular: Negative for chest pain and leg swelling.   Gastrointestinal: Negative for abdominal pain, nausea and vomiting.   Musculoskeletal: Positive for arthralgias (right knee) and joint swelling (Right knee).   Skin: Positive for color change (Bruising to right knee) and wound (incision to right knee). Negative for rash.   Allergic/Immunologic: Negative for immunocompromised state.   Neurological: Negative for headaches.       Physical Exam     Initial Vitals [04/16/22 1026]   BP Pulse Resp Temp SpO2   135/82 88 18 99.5 °F (37.5 °C) 97 %      MAP       --         Physical Exam    Nursing note and vitals reviewed.  Constitutional: She appears well-developed and well-nourished. She is not diaphoretic. No distress.   HENT:   Head: Normocephalic and atraumatic.   Nose: Nose normal.   Eyes: EOM are normal. Pupils are equal, round, and reactive to light.   Neck: Neck supple.   Normal range of motion.  Cardiovascular: Normal rate and regular rhythm.   Pulmonary/Chest: Breath sounds normal. No respiratory distress. She has no wheezes. She has no rhonchi. She has no rales. She exhibits no tenderness.   Musculoskeletal:         General: Edema present. Normal range of motion.      Cervical back: Normal range of motion and neck supple.     Neurological: She is alert and oriented to person, place, and time. She has normal strength.   Skin: Skin is warm and dry. No rash noted. There is erythema.   Fading ecchymosis over right anterior knee.  8 cm area of erythema over right anterior knee.  1 cm incision over right knee actively draining dark red blood.   Psychiatric: She has a normal mood and affect. Her behavior is normal. Thought content normal.                 ED Course   I & D - Incision and Drainage    Date/Time: 4/16/2022 4:24 PM  Location procedure was performed: [facility] EMERGENCY DEPARTMENT  Performed by:  Kya Zacarias MD  Authorized by: Kya Zacarias MD   Consent Done: Yes  Consent: Verbal consent obtained.  Risks and benefits: risks, benefits and alternatives were discussed  Consent given by: patient  Patient understanding: patient states understanding of the procedure being performed  Imaging studies: imaging studies available  Patient identity confirmed: verbally with patient and name  Type: hematoma  Body area: lower extremity  Location details: right leg  Anesthesia: local infiltration    Anesthesia:  Local Anesthetic: lidocaine 1% with epinephrine  Anesthetic total: 8 mL    Patient sedated: no  Complexity: simple  Drainage: bloody  Drainage amount: moderate  Packing material: 1/4 in gauze  Estimated blood loss (mL): 20  Comments: Incision performed 2 days prior with expulsion of significant amount of dark bloody drainage after removal of packing.  Area cleaned with chlorhexidine. Existing wound was anesthetized with lidocaine 5 ml, however continued to have sanguinous drainage. Hemostasis achieved with 8 ml lidocaine-epinephrine, wound irrigated with 500 ml NS until clear. No purulence. Wound packed with povidone-gauze, bacitracin applied, and gauze applied.         Labs Reviewed   C-REACTIVE PROTEIN - Abnormal; Notable for the following components:       Result Value    CRP 84.2 (*)     All other components within normal limits   CBC W/ AUTO DIFFERENTIAL - Abnormal; Notable for the following components:    RBC 3.65 (*)     Hemoglobin 10.6 (*)     Hematocrit 33.7 (*)     MCHC 31.5 (*)     RDW 17.9 (*)     Immature Granulocytes 1.1 (*)     Gran # (ANC) 8.9 (*)     Immature Grans (Abs) 0.12 (*)     Lymph # 0.9 (*)     Gran % 83.8 (*)     Lymph % 8.3 (*)     All other components within normal limits   COMPREHENSIVE METABOLIC PANEL - Abnormal; Notable for the following components:    Glucose 163 (*)     All other components within normal limits   SEDIMENTATION RATE          Imaging Results          CT  Knee Without Contrast Right (Final result)  Result time 04/16/22 13:19:56    Final result by Alfonso Dickerson MD (04/16/22 13:19:56)                 Impression:      1. Soft tissue injury involving the anterior soft tissues of the knee.  There is disorganized fluid and air noting apparent open injury to the skin surface.  Air within the collection is likely related to skin surface communication however infected collection is not excluded and correlation is needed.  No findings to suggest deep infection.  2. No acute displaced fracture or dislocation of the knee noting degenerative changes and additional findings above.      Electronically signed by: Alfonso Dickerson MD  Date:    04/16/2022  Time:    13:19             Narrative:    EXAMINATION:  CT KNEE WITHOUT CONTRAST RIGHT    CLINICAL HISTORY:  Septic arthritis suspected, knee, xray done;Soft tissue infection suspected, knee, xray done;    TECHNIQUE:  Axial images of the right knee were obtained at 0.625 mm intervals without administration of IV contrast.  Coronal and sagittal reformatted images were reviewed.    COMPARISON:  Radiograph 04/16/2022    FINDINGS:  There is osteopenia.  The patella is intact.  Degenerative changes are noted of the knee.  The distal femur is intact.  The visualized portions of the tibia and fibula are intact.  There is medial compartmental narrowing noting prominent degenerative changes of the medial tibial plateau.  There is right meniscal calcification.  No dislocation.  No radiopaque foreign body.    There is soft tissue edema and disorganized fluid along the anterior aspect of the knee.  Several punctate foci of subcutaneous emphysema are noted throughout the soft tissues and within this collection.  There is open wound communicating with anterior aspect of the skin surface.  Edema and fluid remains anterior to the patellar tendon and quadriceps tendon, no induration within Hoffa's fat pad.                               X-Ray Knee  1 or 2 View Right (Final result)  Result time 04/16/22 12:22:49    Final result by Alfonso Dickerson MD (04/16/22 12:22:49)                 Impression:      1. No acute displaced fracture or dislocation of the knee.      Electronically signed by: Alfonso Dickerson MD  Date:    04/16/2022  Time:    12:22             Narrative:    EXAMINATION:  XR KNEE 1 OR 2 VIEW RIGHT    CLINICAL HISTORY:  Unspecified open wound, right knee, initial encounter    TECHNIQUE:  AP and lateral views of the right knee were performed.    COMPARISON:  None    FINDINGS:  Two views right knee.    There is osteopenia.  Degenerative changes are noted of the knee noting meniscal calcification.  No acute displaced fracture or dislocation of the knee.  No radiopaque foreign body.  There is vascular calcification.                                 Medications   LIDOcaine 2%/EPINEPHrine 1:100,000 injection 10 mL (10 mLs Intradermal Given 4/16/22 1446)   LETS (LIDOcaine-TETRAcaine-EPINEPHrine) gel solution ( Topical (Top) Given 4/16/22 1447)   bacitracin zinc ointment 1 each (1 each Topical (Top) Given 4/16/22 1355)     Medical Decision Making:   History:   Old Medical Records: I decided to obtain old medical records.  Old Records Summarized: records from clinic visits and records from previous admission(s).       <> Summary of Records: valuation after a fall. Fall occurred yesterday. Reports that she tripped while walking into a restaurant, hitting her head on the door and falling to the ground. Patient reports neck pain, right lower leg pain, bruising to the right lower leg. Pain is constant. Pain is worse with weight-bearing. Patient reports pain relief with Tylenol and gabapentin. Patient is also complaining of chest pain. Chest pain present when she woke up this morning. Chest pain is not exertional. Chest pain is worse with deep breathing and movement. Associated shortness of breath    Patient seen in the ED 4/14 for increasing right knee swelling  and redness, per patient's report she had I&D with drainage of hematoma and started on doxycycline 100 mg BID x7 days.   Initial Assessment:   Patient is non-toxic appearing. Ecchymosis appears to be improving compared to prior presentation (see images in PE), however incision from 2 days ago actively draining significant amount of dark red blood. Not pulsatile or purulent. No significant warmth   Differential Diagnosis:   Cellulitis, hematoma, infected hematoma, less likely septic joint as she has normal ROM.  Clinical Tests:   Lab Tests: Ordered and Reviewed  Radiological Study: Ordered and Reviewed  ED Management:  Will numb right knee with lidocaine/epinephrine and re-pack incision.  Will obtain X-ray of right knee.  CT obtained, and reviewed by Ortho. They recommend continuing abx outpt and follow up in clinic.      Stable for d/c, I discussed outpatient follow up and return precautions with pt and answered all questions.    Other:   I have discussed this case with another health care provider.       <> Summary of the Discussion: D/w Ortho who recommend CT with contrast, however pt allergic to contrast.           Scribe Attestation:   Scribe #1: I performed the above scribed service and the documentation accurately describes the services I performed. I attest to the accuracy of the note.        ED Course as of 04/16/22 1653   Sat Apr 16, 2022   1359 WBC: 10.61 [JR]   1416 CRP(!): 84.2 [JR]   1452 Sed Rate: 20 [JR]      ED Course User Index  [JR] Kya Zacarias MD             Clinical Impression:   Final diagnoses:  [S81.001A] Open knee wound, right, initial encounter  [S80.01XD] Traumatic hematoma of right knee, subsequent encounter (Primary)          ED Disposition Condition    Discharge Stable        ED Prescriptions     None        Follow-up Information     Follow up With Specialties Details Why Contact Info    [facility] - Emergency Dept Emergency Medicine Go in 2 days For wound re-check [address]  [facility] [id number]  [phone]           Kya Zacarias MD  05/24/22 5335

## 2022-04-16 NOTE — DISCHARGE INSTRUCTIONS
You were seen in the emergency department for evaluation of wound.  The wound was cleaned and packed.  Please return to the emergency department in 2 days for a re-evaluation.  Continue to take your antibiotics as prescribed.    Return to the emergency department at any time if you experience worsening pain, redness or swelling, worsening bleeding or drainage, fevers or chills, or other concerning symptoms.

## 2022-04-16 NOTE — ED NOTES
The patient was seen in the ER on 4/14/22 for a hematoma on the right knee that was drained. The patient arrived wearing pants (was changed into a gown). Arrived with right knee wrapped in ACE wrap. + swelling noted to right leg. States she was told to come back to er for wound recheck. Pt is currently taking abx. Has cane with her for ambulation. Escorted into intake via wheelchair and needed assistance to get up. Arrived with wound to right knee covered with gauze 4x4 and paper tape

## 2022-04-18 ENCOUNTER — PATIENT OUTREACH (OUTPATIENT)
Dept: EMERGENCY MEDICINE | Facility: HOSPITAL | Age: 77
End: 2022-04-18

## 2022-04-18 ENCOUNTER — HOSPITAL ENCOUNTER (EMERGENCY)
Facility: HOSPITAL | Age: 77
Discharge: HOME | End: 2022-04-18
Attending: EMERGENCY MEDICINE
Payer: MEDICARE

## 2022-04-18 VITALS
BODY MASS INDEX: 31.65 KG/M2 | SYSTOLIC BLOOD PRESSURE: 150 MMHG | TEMPERATURE: 99 F | OXYGEN SATURATION: 99 % | DIASTOLIC BLOOD PRESSURE: 75 MMHG | RESPIRATION RATE: 16 BRPM | HEIGHT: 62 IN | WEIGHT: 172 LBS | HEART RATE: 72 BPM

## 2022-04-18 DIAGNOSIS — L08.9 RIGHT KNEE SKIN INFECTION: Primary | ICD-10-CM

## 2022-04-18 PROBLEM — L03.115 CELLULITIS OF RIGHT LOWER EXTREMITY: Status: ACTIVE | Noted: 2022-04-18

## 2022-04-18 LAB
ALBUMIN SERPL BCP-MCNC: 3.7 G/DL (ref 3.5–5.2)
ALP SERPL-CCNC: 59 U/L (ref 55–135)
ALT SERPL W/O P-5'-P-CCNC: 23 U/L (ref 10–44)
ANION GAP SERPL CALC-SCNC: 12 MMOL/L (ref 8–16)
AST SERPL-CCNC: 18 U/L (ref 10–40)
BASOPHILS # BLD AUTO: 0.03 K/UL (ref 0–0.2)
BASOPHILS NFR BLD: 0.3 % (ref 0–1.9)
BILIRUB SERPL-MCNC: 0.7 MG/DL (ref 0.1–1)
BUN SERPL-MCNC: 25 MG/DL (ref 8–23)
CALCIUM SERPL-MCNC: 9 MG/DL (ref 8.7–10.5)
CHLORIDE SERPL-SCNC: 104 MMOL/L (ref 95–110)
CO2 SERPL-SCNC: 23 MMOL/L (ref 23–29)
CREAT SERPL-MCNC: 0.9 MG/DL (ref 0.5–1.4)
CRP SERPL-MCNC: 42.7 MG/L (ref 0–8.2)
DIFFERENTIAL METHOD: ABNORMAL
EOSINOPHIL # BLD AUTO: 0 K/UL (ref 0–0.5)
EOSINOPHIL NFR BLD: 0.3 % (ref 0–8)
ERYTHROCYTE [DISTWIDTH] IN BLOOD BY AUTOMATED COUNT: 17.5 % (ref 11.5–14.5)
ERYTHROCYTE [SEDIMENTATION RATE] IN BLOOD BY WESTERGREN METHOD: 15 MM/HR (ref 0–36)
EST. GFR  (AFRICAN AMERICAN): >60 ML/MIN/1.73 M^2
EST. GFR  (NON AFRICAN AMERICAN): >60 ML/MIN/1.73 M^2
GLUCOSE SERPL-MCNC: 143 MG/DL (ref 70–110)
GRAM STN SPEC: NORMAL
GRAM STN SPEC: NORMAL
HCT VFR BLD AUTO: 36 % (ref 37–48.5)
HGB BLD-MCNC: 11.1 G/DL (ref 12–16)
IMM GRANULOCYTES # BLD AUTO: 0.05 K/UL (ref 0–0.04)
IMM GRANULOCYTES NFR BLD AUTO: 0.5 % (ref 0–0.5)
LYMPHOCYTES # BLD AUTO: 0.7 K/UL (ref 1–4.8)
LYMPHOCYTES NFR BLD: 6.9 % (ref 18–48)
MCH RBC QN AUTO: 29.1 PG (ref 27–31)
MCHC RBC AUTO-ENTMCNC: 30.8 G/DL (ref 32–36)
MCV RBC AUTO: 94 FL (ref 82–98)
MONOCYTES # BLD AUTO: 0.4 K/UL (ref 0.3–1)
MONOCYTES NFR BLD: 3.6 % (ref 4–15)
NEUTROPHILS # BLD AUTO: 8.5 K/UL (ref 1.8–7.7)
NEUTROPHILS NFR BLD: 88.4 % (ref 38–73)
NRBC BLD-RTO: 0 /100 WBC
PLATELET # BLD AUTO: 307 K/UL (ref 150–450)
PMV BLD AUTO: 9.9 FL (ref 9.2–12.9)
POTASSIUM SERPL-SCNC: 3.7 MMOL/L (ref 3.5–5.1)
PROT SERPL-MCNC: 6.7 G/DL (ref 6–8.4)
RBC # BLD AUTO: 3.82 M/UL (ref 4–5.4)
SODIUM SERPL-SCNC: 139 MMOL/L (ref 136–145)
WBC # BLD AUTO: 9.63 K/UL (ref 3.9–12.7)

## 2022-04-18 PROCEDURE — 87116 MYCOBACTERIA CULTURE: CPT

## 2022-04-18 PROCEDURE — 87102 FUNGUS ISOLATION CULTURE: CPT

## 2022-04-18 PROCEDURE — 86140 C-REACTIVE PROTEIN: CPT

## 2022-04-18 PROCEDURE — 25000003 PHARM REV CODE 250: Performed by: EMERGENCY MEDICINE

## 2022-04-18 PROCEDURE — 87075 CULTR BACTERIA EXCEPT BLOOD: CPT

## 2022-04-18 PROCEDURE — 99283 EMERGENCY DEPT VISIT LOW MDM: CPT

## 2022-04-18 PROCEDURE — 99284 EMERGENCY DEPT VISIT MOD MDM: CPT | Mod: ,,, | Performed by: EMERGENCY MEDICINE

## 2022-04-18 PROCEDURE — 85652 RBC SED RATE AUTOMATED: CPT

## 2022-04-18 PROCEDURE — 87205 SMEAR GRAM STAIN: CPT

## 2022-04-18 PROCEDURE — 99284 PR EMERGENCY DEPT VISIT,LEVEL IV: ICD-10-PCS | Mod: ,,, | Performed by: EMERGENCY MEDICINE

## 2022-04-18 PROCEDURE — 87070 CULTURE OTHR SPECIMN AEROBIC: CPT

## 2022-04-18 PROCEDURE — 85025 COMPLETE CBC W/AUTO DIFF WBC: CPT

## 2022-04-18 PROCEDURE — 80053 COMPREHEN METABOLIC PANEL: CPT

## 2022-04-18 PROCEDURE — 87206 SMEAR FLUORESCENT/ACID STAI: CPT

## 2022-04-18 RX ORDER — CEPHALEXIN 500 MG/1
500 CAPSULE ORAL 4 TIMES DAILY
Qty: 28 CAPSULE | Refills: 0 | Status: SHIPPED | OUTPATIENT
Start: 2022-04-18 | End: 2022-04-20

## 2022-04-18 RX ORDER — DOXYCYCLINE 100 MG/1
100 CAPSULE ORAL 2 TIMES DAILY
Qty: 14 CAPSULE | Refills: 0 | Status: SHIPPED | OUTPATIENT
Start: 2022-04-18 | End: 2022-04-20

## 2022-04-18 RX ORDER — CEPHALEXIN 500 MG/1
500 CAPSULE ORAL
Status: COMPLETED | OUTPATIENT
Start: 2022-04-18 | End: 2022-04-18

## 2022-04-18 RX ADMIN — CEPHALEXIN 500 MG: 500 CAPSULE ORAL at 11:04

## 2022-04-18 NOTE — PROGRESS NOTES
Patient declined ED navigation assessment and denied any needs at this time.         Susana Mccloud, ED Navigator, [facility]  [phone], ext. [phone]

## 2022-04-18 NOTE — HPI
Jocelyne Duncan is a 77 y.o. female with past medical history of GERD, HTN, arthritis, Rheumatoid Arthritis, who presented to the ED with a complaint of right knee pain and swelling. This is a patient who sustained a fall about 2 weeks ago. She was seen in the ED on 4/14 for increasing right knee swelling and redness.  She had an I&D and packing placement then for drainage of a hematoma.  She was seen here again on 4/16 for packing removal, repeat I&D, and repeat packing placement.  She was told to follow-up with her PCP, but the PCP was uncomfortable so the patient returned to the ER for further evaluation.    Orthopedics was consulted for evaluation of the right knee pain due to concerns for superficial infection or septic arthritis.  Patient reports that her pain and redness have improved since being seen 2 days ago.  After the packing was removed, the patient was able to fully range her knee and ambulate with minimal pain.  She denies purulent discharge and says the only fluid draining his clear red fluid.  She has taking doxycycline since 04/14.  Denies fevers, chills, chest pain, shortness of breath, and worsening leg swelling.

## 2022-04-18 NOTE — ASSESSMENT & PLAN NOTE
Jocelyne Duncan is a 77 y.o. female with past medical history of HTN and rheumatoid arthritis now status post 2 I&Ds for hematoma evacuation of her right lower extremity.  Patient reports clinical improvement in her pain and erythema while being able to range her knee and ambulate with minimal difficulty.  No leukocytosis.  ESR and CRP down trending.  Low concern for septic arthritis and prepatellar bursitis.  Patient presentation likely consistent with cellulitis around hematoma evacuation site.  Given continued cellulitis, will add Keflex to the antibiotic regimen.  Cultures taken of the wound at this time to help tailor antibiotic therapy if growth occurs.  Patient will be placed in a knee immobilizer for soft tissue rest of her anterior knee.  Plan to follow-up in Orthopedics Clinic in 1 week for a wound check.    Plan:  - Continue doxycycline; add Keflex; continue antibiotics for a total of 2 weeks  - Soft dressing applied to knee  - Knee immobilizer placed for soft tissue rest  - Follow up in 1 week for a wound check

## 2022-04-18 NOTE — SUBJECTIVE & OBJECTIVE
Past Medical History:   Diagnosis Date    Arthritis     GERD (gastroesophageal reflux disease)     HTN (hypertension)     Premature atrial complexes     Rheumatoid arthritis        Past Surgical History:   Procedure Laterality Date    LUMBAR SPINE SURGERY         Review of patient's allergies indicates:   Allergen Reactions    Iodine      Swelling (throat)^  Unsure of what drug/food triggered the reaction      Adhesive Itching    Penicillins        No current facility-administered medications for this encounter.     Current Outpatient Medications   Medication Sig    amLODIPine (NORVASC) 5 MG tablet Take 5 mg by mouth once daily.     atorvastatin (LIPITOR) 20 MG tablet Take 20 mg by mouth once daily.     BREO ELLIPTA 100-25 mcg/dose diskus inhaler INHALE 1 PUFF INTO THE LUNGS EVERY DAY    cephALEXin (KEFLEX) 500 MG capsule Take 1 capsule (500 mg total) by mouth 4 (four) times daily. for 7 days    diclofenac (VOLTAREN) 75 MG EC tablet Take 75 mg by mouth 2 (two) times daily.    doxycycline (VIBRAMYCIN) 100 MG Cap Take 1 capsule (100 mg total) by mouth 2 (two) times daily. for 7 days    doxycycline (VIBRAMYCIN) 100 MG Cap Take 1 capsule (100 mg total) by mouth 2 (two) times daily. for 7 days    esomeprazole (NEXIUM) 40 MG capsule Take 40 mg by mouth once daily.    folic acid (FOLVITE) 800 MCG Tab Take 800 mcg by mouth once daily.    furosemide (LASIX) 20 MG tablet Take 20 mg by mouth once daily.     lisinopril-hydrochlorothiazide (PRINZIDE,ZESTORETIC) 20-12.5 mg per tablet Take 1 tablet by mouth once daily.     methocarbamoL (ROBAXIN) 500 MG Tab Take 500 mg by mouth 3 (three) times daily as needed.    methotrexate 2.5 MG Tab every 7 days.     nystatin (MYCOSTATIN) cream Apply topically as needed.    predniSONE (DELTASONE) 1 MG tablet Take 1 mg by mouth 2 (two) times daily.     vitamin D3-folic acid 5,000 unit- 1 mg Tab Take 1 tablet by mouth once daily.     Family History    None       Tobacco Use    Smoking status:  "Never Smoker    Smokeless tobacco: Never Used   Substance and Sexual Activity    Alcohol use: Yes     Comment: occ    Drug use: Never    Sexual activity: Not Currently     Partners: Male     Birth control/protection: Post-menopausal     ROS  Constitutional: negative for fevers  Eyes: negative visual changes  ENT: negative for hearing loss  Respiratory: negative for dyspnea  Cardiovascular: negative for chest pain  Gastrointestinal: negative for abdominal pain  Genitourinary: negative for dysuria  Neurological: negative for headaches  Endocrine: negative for temperature intolerance  MSK: positive for right knee redness and drainage    Objective:     Vital Signs (Most Recent):  Temp: 98.8 °F (37.1 °C) (04/18/22 0912)  Pulse: 70 (04/18/22 1126)  Resp: 18 (04/18/22 1126)  BP: (!) 159/77 (04/18/22 1126)  SpO2: 99 % (04/18/22 1126)   Vital Signs (24h Range):  Temp:  [98.8 °F (37.1 °C)] 98.8 °F (37.1 °C)  Pulse:  [70-81] 70  Resp:  [16-18] 18  SpO2:  [97 %-99 %] 99 %  BP: (140-159)/(62-77) 159/77     Weight: 78 kg (172 lb)  Height: 5' 2" (157.5 cm)  Body mass index is 31.46 kg/m².      Ortho/SPM Exam  Gen: NAD, laying comfortably in bed  CV: regular rate  Resp: non-labored respirations    RLE:  1 cm longitudinal incision over inferolateral knee with serosanguineous drainage  Two 1 x 1 cm areas of scab with fibrinous tissue present; no expressible purulence  Erythema surrounding anterior aspect of knee  Appropriately TTP around incision  Compartments soft  ROM knee with minimal pain  SILT Sa/Stevens/DP/SP/T  Motor intact EHL/FHL/TA/Gastroc  2+ DP, 2+ PT            Significant Labs: All pertinent labs within the past 24 hours have been reviewed.  ESR 15  CRP 42.7  WBC 9.63    Significant Imaging: I have reviewed all pertinent imaging results/findings.  X-ray right knee with no acute osseous abnormality; no effusion  "

## 2022-04-18 NOTE — CONSULTS
[facility]  Orthopedics  Consult Note    Patient Name: Jocelyne Duncan  MRN: [id number]  Admission Date: 4/18/2022  Hospital Length of Stay: 0 days  Attending Provider: Magdaleno Pizano MD  Primary Care Provider: ANCELMO Weston    Patient information was obtained from patient and ER records.     Inpatient consult to Orthopedic Surgery  Consult performed by: Thiago Truong MD  Consult ordered by: Magdaleno Pizano MD        Subjective:     Principal Problem:Cellulitis of right lower extremity    Chief Complaint:   Chief Complaint   Patient presents with    Knee Pain     Told to come for recheck has packing to r knee for poss surg        HPI: Jocelyne Duncan is a 77 y.o. female with past medical history of GERD, HTN, arthritis, Rheumatoid Arthritis, who presented to the ED with a complaint of right knee pain and swelling. This is a patient who sustained a fall about 2 weeks ago. She was seen in the ED on 4/14 for increasing right knee swelling and redness.  She had an I&D and packing placement then for drainage of a hematoma.  She was seen here again on 4/16 for packing removal, repeat I&D, and repeat packing placement.  She was told to follow-up with her PCP, but the PCP was uncomfortable so the patient returned to the ER for further evaluation.    Orthopedics was consulted for evaluation of the right knee pain due to concerns for superficial infection or septic arthritis.  Patient reports that her pain and redness have improved since being seen 2 days ago.  After the packing was removed, the patient was able to fully range her knee and ambulate with minimal pain.  She denies purulent discharge and says the only fluid draining his clear red fluid.  She has taking doxycycline since 04/14.  Denies fevers, chills, chest pain, shortness of breath, and worsening leg swelling.      Past Medical History:   Diagnosis Date    Arthritis     GERD (gastroesophageal reflux disease)     HTN  (hypertension)     Premature atrial complexes     Rheumatoid arthritis        Past Surgical History:   Procedure Laterality Date    LUMBAR SPINE SURGERY         Review of patient's allergies indicates:   Allergen Reactions    Iodine      Swelling (throat)^  Unsure of what drug/food triggered the reaction      Adhesive Itching    Penicillins        No current facility-administered medications for this encounter.     Current Outpatient Medications   Medication Sig    amLODIPine (NORVASC) 5 MG tablet Take 5 mg by mouth once daily.     atorvastatin (LIPITOR) 20 MG tablet Take 20 mg by mouth once daily.     BREO ELLIPTA 100-25 mcg/dose diskus inhaler INHALE 1 PUFF INTO THE LUNGS EVERY DAY    cephALEXin (KEFLEX) 500 MG capsule Take 1 capsule (500 mg total) by mouth 4 (four) times daily. for 7 days    diclofenac (VOLTAREN) 75 MG EC tablet Take 75 mg by mouth 2 (two) times daily.    doxycycline (VIBRAMYCIN) 100 MG Cap Take 1 capsule (100 mg total) by mouth 2 (two) times daily. for 7 days    doxycycline (VIBRAMYCIN) 100 MG Cap Take 1 capsule (100 mg total) by mouth 2 (two) times daily. for 7 days    esomeprazole (NEXIUM) 40 MG capsule Take 40 mg by mouth once daily.    folic acid (FOLVITE) 800 MCG Tab Take 800 mcg by mouth once daily.    furosemide (LASIX) 20 MG tablet Take 20 mg by mouth once daily.     lisinopril-hydrochlorothiazide (PRINZIDE,ZESTORETIC) 20-12.5 mg per tablet Take 1 tablet by mouth once daily.     methocarbamoL (ROBAXIN) 500 MG Tab Take 500 mg by mouth 3 (three) times daily as needed.    methotrexate 2.5 MG Tab every 7 days.     nystatin (MYCOSTATIN) cream Apply topically as needed.    predniSONE (DELTASONE) 1 MG tablet Take 1 mg by mouth 2 (two) times daily.     vitamin D3-folic acid 5,000 unit- 1 mg Tab Take 1 tablet by mouth once daily.     Family History    None       Tobacco Use    Smoking status: Never Smoker    Smokeless tobacco: Never Used   Substance and Sexual Activity     "Alcohol use: Yes     Comment: occ    Drug use: Never    Sexual activity: Not Currently     Partners: Male     Birth control/protection: Post-menopausal     ROS  Constitutional: negative for fevers  Eyes: negative visual changes  ENT: negative for hearing loss  Respiratory: negative for dyspnea  Cardiovascular: negative for chest pain  Gastrointestinal: negative for abdominal pain  Genitourinary: negative for dysuria  Neurological: negative for headaches  Endocrine: negative for temperature intolerance  MSK: positive for right knee redness and drainage    Objective:     Vital Signs (Most Recent):  Temp: 98.8 °F (37.1 °C) (04/18/22 0912)  Pulse: 70 (04/18/22 1126)  Resp: 18 (04/18/22 1126)  BP: (!) 159/77 (04/18/22 1126)  SpO2: 99 % (04/18/22 1126)   Vital Signs (24h Range):  Temp:  [98.8 °F (37.1 °C)] 98.8 °F (37.1 °C)  Pulse:  [70-81] 70  Resp:  [16-18] 18  SpO2:  [97 %-99 %] 99 %  BP: (140-159)/(62-77) 159/77     Weight: 78 kg (172 lb)  Height: 5' 2" (157.5 cm)  Body mass index is 31.46 kg/m².      Ortho/SPM Exam  Gen: NAD, laying comfortably in bed  CV: regular rate  Resp: non-labored respirations    RLE:  1 cm longitudinal incision over inferolateral knee with serosanguineous drainage  Two 1 x 1 cm areas of scab with fibrinous tissue present; no expressible purulence  Erythema surrounding anterior aspect of knee  Appropriately TTP around incision  No joint line tenderness  Compartments soft  ROM knee with minimal pain  SILT Sa/Stevens/DP/SP/T  Motor intact EHL/FHL/TA/Gastroc  2+ DP, 2+ PT            Significant Labs: All pertinent labs within the past 24 hours have been reviewed.  ESR 15  CRP 42.7  WBC 9.63    Significant Imaging: I have reviewed all pertinent imaging results/findings.  X-ray right knee with no acute osseous abnormality; no effusion    Assessment/Plan:     * Cellulitis of right lower extremity  Jocelyne Duncan is a 77 y.o. female with past medical history of HTN and rheumatoid arthritis now status " post 2 I&Ds for hematoma evacuation of her right lower extremity.  Patient reports clinical improvement in her pain and erythema while being able to range her knee and ambulate with minimal difficulty.  No leukocytosis.  ESR and CRP down trending.  Low concern for septic arthritis and prepatellar bursitis.  Patient presentation likely consistent with cellulitis around hematoma evacuation site.  Given continued cellulitis, will add Keflex to the antibiotic regimen.  Cultures taken of the wound at this time to help tailor antibiotic therapy if growth occurs.  Patient will be placed in a knee immobilizer for soft tissue rest of her anterior knee.  Plan to follow-up in Orthopedics Clinic in 1 week for a wound check.    Plan:  - Continue doxycycline; add Keflex; continue antibiotics for a total of 2 weeks  - Soft dressing applied to knee  - Knee immobilizer placed for soft tissue rest  - Follow up in 1 week for a wound check        Thiago Truong MD  Orthopedics  [facility] - [facility] Dept

## 2022-04-18 NOTE — ED NOTES
Pt had fall end of March, injuring right knee, developed infection. Currently on doxycycline. Right knee is red, hot to touch, edematous, with serosanguineous drainage from opening to lateral knee. Reports chills at home.

## 2022-04-18 NOTE — ED PROVIDER NOTES
Encounter Date: 4/18/2022    SCRIBE #1 NOTE: ILiz, am scribing for, and in the presence of,  Magdaleno Pizano MD. I have scribed the following portions of the note - Other sections scribed: HPI ROS PE.       History     Chief Complaint   Patient presents with    Knee Pain     Told to come for recheck has packing to r knee for poss surg     Time patient was seen by the provider: 9:38 AM      The patient is a 77 y.o. female with past medical history of GERD, HTN, arthritis, Rheumatoid Arthritis, who presents to the ED with a complaint of right knee pain and swelling. This is a patient who sustained a fall about 3 weeks ago. She was not seen in the ED until 1 week later at [facility]. She was then seen in the ED on 4/14, for increasing right knee swelling and redness. Underwent I&D with drainage of hematoma. She was seen here again on 4/16, for draining dark red blood. Reports that there is still drainage present from the wound with significant swelling and redness. Patient is on Doxycycline. Denies nausea, vomiting, diarrhea, fever, cough, SOB, chest pain, abdominal pain, dysuria. A ten point review of systems was completed and is negative except as documented above.  Patient denies any other acute medical complaint. The patients available PMH, PSH, Social History, medications, allergies, and triage vital signs were reviewed just prior to their medical evaluation.      The history is provided by the patient and medical records. No  was used.     Review of patient's allergies indicates:   Allergen Reactions    Iodine      Swelling (throat)^  Unsure of what drug/food triggered the reaction      Adhesive Itching    Penicillins      Past Medical History:   Diagnosis Date    Arthritis     GERD (gastroesophageal reflux disease)     HTN (hypertension)     Premature atrial complexes     Rheumatoid arthritis      Past Surgical History:   Procedure Laterality Date    LUMBAR SPINE  SURGERY       Family History   Problem Relation Age of Onset    Breast cancer Neg Hx     Colon cancer Neg Hx     Ovarian cancer Neg Hx      Social History     Tobacco Use    Smoking status: Never Smoker    Smokeless tobacco: Never Used   Substance Use Topics    Alcohol use: Yes     Comment: occ    Drug use: Never     Review of Systems   Constitutional: Negative for fever.   HENT: Negative for sore throat.    Eyes: Negative for visual disturbance.   Respiratory: Negative for cough and shortness of breath.    Cardiovascular: Negative for chest pain.   Gastrointestinal: Negative for abdominal pain, diarrhea, nausea and vomiting.   Genitourinary: Negative for dysuria.   Musculoskeletal: Positive for arthralgias (R knee) and joint swelling (R knee). Negative for neck pain.   Skin: Positive for color change and wound. Negative for rash.   Allergic/Immunologic: Negative for immunocompromised state.   Neurological: Negative for syncope.   Psychiatric/Behavioral: Negative for confusion.       Physical Exam     Initial Vitals [04/18/22 0912]   BP Pulse Resp Temp SpO2   (!) 140/62 81 16 98.8 °F (37.1 °C) 97 %      MAP       --         Physical Exam    Nursing note and vitals reviewed.  Constitutional: She appears well-developed and well-nourished. She is not diaphoretic. No distress.   HENT:   Head: Normocephalic and atraumatic.   Eyes: Conjunctivae are normal. Right eye exhibits no discharge. Left eye exhibits no discharge.   Neck: Neck supple.   Normal range of motion.  Cardiovascular: Normal rate, regular rhythm, normal heart sounds and intact distal pulses. Exam reveals no gallop and no friction rub.    No murmur heard.  Pulmonary/Chest: Breath sounds normal. No respiratory distress. She has no wheezes. She has no rhonchi. She has no rales.   Abdominal: Abdomen is soft. She exhibits no distension. There is no abdominal tenderness. There is no rebound and no guarding.   Musculoskeletal:         General: Tenderness  present. No edema. Normal range of motion.      Cervical back: Normal range of motion and neck supple.      Right foot: Normal pulse.      Comments: Good pulse to right foot.     Neurological: She is alert and oriented to person, place, and time. She has normal strength. GCS score is 15. GCS eye subscore is 4. GCS verbal subscore is 5. GCS motor subscore is 6.   Skin: Skin is warm and dry. No rash noted. There is erythema.   Cellulitis to the right knee with active draining from previous incision.   Psychiatric: She has a normal mood and affect. Her behavior is normal. Judgment and thought content normal.         ED Course   Procedures  Labs Reviewed   C-REACTIVE PROTEIN - Abnormal; Notable for the following components:       Result Value    CRP 42.7 (*)     All other components within normal limits   CBC W/ AUTO DIFFERENTIAL - Abnormal; Notable for the following components:    RBC 3.82 (*)     Hemoglobin 11.1 (*)     Hematocrit 36.0 (*)     MCHC 30.8 (*)     RDW 17.5 (*)     Gran # (ANC) 8.5 (*)     Immature Grans (Abs) 0.05 (*)     Lymph # 0.7 (*)     Gran % 88.4 (*)     Lymph % 6.9 (*)     Mono % 3.6 (*)     All other components within normal limits   COMPREHENSIVE METABOLIC PANEL - Abnormal; Notable for the following components:    Glucose 143 (*)     BUN 25 (*)     All other components within normal limits   GRAM STAIN   CULTURE, ANAEROBIC   CULTURE, AEROBIC  (SPECIFY SOURCE)   AFB CULTURE & SMEAR   CULTURE, FUNGUS   SEDIMENTATION RATE          Imaging Results    None          Medications   cephALEXin capsule 500 mg (500 mg Oral Given 4/18/22 1124)     Medical Decision Making:   History:   I obtained history from: someone other than patient.       <> Summary of History:  assists HPI  Old Medical Records: I decided to obtain old medical records.  Old Records Summarized: records from clinic visits.       <> Summary of Records: Photos, ED note  Clinical Tests:   Lab Tests: Reviewed and Ordered  ED  Management:  For 77-year-old female presents with persistent cellulitis over the right knee.  Vitals with hypertension.  Physical exam as above.  Discussed with orthopedics who evaluated bedside.  They doubt septic joint.  Labs are improving.  No indication for arthrocentesis or washout at this time.  Will extend and expand her antibiotic coverage.  She will follow up in orthopedics clinic for wound check.  Patient will return to ED for worsening symptoms, inability to eat/drink, fever greater than 100.4, or any other concerns.  Did bedside teaching with return precautions.  All questions answered.  The patient acknowledges understanding.  Gave verbal discharge instructions.  Other:   I have discussed this case with another health care provider.       <> Summary of the Discussion: Orthopedic Surgery          Scribe Attestation:   Scribe #1: I performed the above scribed service and the documentation accurately describes the services I performed. I attest to the accuracy of the note.                 Clinical Impression:   Final diagnoses:  [L08.9] Right knee skin infection (Primary)          ED Disposition Condition    Discharge Stable        ED Prescriptions     Medication Sig Dispense Start Date End Date Auth. Provider    cephALEXin (KEFLEX) 500 MG capsule Take 1 capsule (500 mg total) by mouth 4 (four) times daily. for 7 days 28 capsule 4/18/2022 4/25/2022 Magdaleno Pizano MD    doxycycline (VIBRAMYCIN) 100 MG Cap Take 1 capsule (100 mg total) by mouth 2 (two) times daily. for 7 days 14 capsule 4/18/2022 4/25/2022 Magdaleno Pizano MD        Follow-up Information     Follow up With Specialties Details Why Contact Info Additional Information    [facility] Emergency Medicine  Return to ED for worsening symptoms, inability to eat/drink, fever greater than 100.4, or any other concerns. [address]  [facility] [address] [phone]     [facility]    [address]  [facility] [address] [phone] [facility] - Main Building, 5th Floor Please park in [facility] and take Atrium elevator           Magdaleno Pizano MD  04/19/22 0912

## 2022-04-20 ENCOUNTER — OFFICE VISIT (OUTPATIENT)
Dept: ORTHOPEDICS | Facility: CLINIC | Age: 77
End: 2022-04-20
Payer: MEDICARE

## 2022-04-20 VITALS — WEIGHT: 171.94 LBS | TEMPERATURE: 98 F | BODY MASS INDEX: 31.64 KG/M2 | HEIGHT: 62 IN

## 2022-04-20 DIAGNOSIS — L08.9 RIGHT KNEE SKIN INFECTION: ICD-10-CM

## 2022-04-20 PROCEDURE — 99204 OFFICE O/P NEW MOD 45 MIN: CPT | Mod: S$GLB,,, | Performed by: PHYSICIAN ASSISTANT

## 2022-04-20 PROCEDURE — 3288F PR FALLS RISK ASSESSMENT DOCUMENTED: ICD-10-PCS | Mod: CPTII,S$GLB,, | Performed by: PHYSICIAN ASSISTANT

## 2022-04-20 PROCEDURE — 1125F PR PAIN SEVERITY QUANTIFIED, PAIN PRESENT: ICD-10-PCS | Mod: CPTII,S$GLB,, | Performed by: PHYSICIAN ASSISTANT

## 2022-04-20 PROCEDURE — 1159F MED LIST DOCD IN RCRD: CPT | Mod: CPTII,S$GLB,, | Performed by: PHYSICIAN ASSISTANT

## 2022-04-20 PROCEDURE — 99999 PR PBB SHADOW E&M-EST. PATIENT-LVL III: ICD-10-PCS | Mod: PBBFAC,,, | Performed by: PHYSICIAN ASSISTANT

## 2022-04-20 PROCEDURE — 1101F PT FALLS ASSESS-DOCD LE1/YR: CPT | Mod: CPTII,S$GLB,, | Performed by: PHYSICIAN ASSISTANT

## 2022-04-20 PROCEDURE — 99999 PR PBB SHADOW E&M-EST. PATIENT-LVL III: CPT | Mod: PBBFAC,,, | Performed by: PHYSICIAN ASSISTANT

## 2022-04-20 PROCEDURE — 1125F AMNT PAIN NOTED PAIN PRSNT: CPT | Mod: CPTII,S$GLB,, | Performed by: PHYSICIAN ASSISTANT

## 2022-04-20 PROCEDURE — 1159F PR MEDICATION LIST DOCUMENTED IN MEDICAL RECORD: ICD-10-PCS | Mod: CPTII,S$GLB,, | Performed by: PHYSICIAN ASSISTANT

## 2022-04-20 PROCEDURE — 1101F PR PT FALLS ASSESS DOC 0-1 FALLS W/OUT INJ PAST YR: ICD-10-PCS | Mod: CPTII,S$GLB,, | Performed by: PHYSICIAN ASSISTANT

## 2022-04-20 PROCEDURE — 3288F FALL RISK ASSESSMENT DOCD: CPT | Mod: CPTII,S$GLB,, | Performed by: PHYSICIAN ASSISTANT

## 2022-04-20 PROCEDURE — 99204 PR OFFICE/OUTPT VISIT, NEW, LEVL IV, 45-59 MIN: ICD-10-PCS | Mod: S$GLB,,, | Performed by: PHYSICIAN ASSISTANT

## 2022-04-20 RX ORDER — CLINDAMYCIN HYDROCHLORIDE 300 MG/1
300 CAPSULE ORAL EVERY 8 HOURS
Qty: 30 CAPSULE | Refills: 0 | Status: SHIPPED | OUTPATIENT
Start: 2022-04-20 | End: 2022-04-30

## 2022-04-21 LAB — BACTERIA SPEC AEROBE CULT: NO GROWTH

## 2022-04-22 NOTE — PROGRESS NOTES
Subjective:      Patient ID: Jocelyne Duncan is a 77 y.o. female.    Chief Complaint: Pain of the Right Knee    HPI    Jocelyne Duncan is a 77 y.o. female with past medical history of GERD, HTN, arthritis, Rheumatoid Arthritis, who presented to the ED on 04/18/22 with a complaint of right knee pain and swelling. This is a patient who sustained a fall about 2 weeks ago. She was seen in the ED on 4/14 for increasing right knee swelling and redness.  She had an I&D and packing placement then for drainage of a hematoma.  She was seen again on 4/16 for packing removal, repeat I&D, and repeat packing placement.  She was told to follow-up with her PCP, but the PCP was uncomfortable so the patient returned to the ER for further evaluation.     Orthopedics was consulted 04/18/22 for evaluation of the right knee pain due to concerns for superficial infection or septic arthritis.  Patient reports that her pain and redness have improved since being seen 2 days ago.  After the packing was removed, the patient was able to fully range her knee and ambulate with minimal pain.  She denies purulent discharge and says the only fluid draining his clear red fluid.  She has taking doxycycline since 04/14 and Keflex x 1 day.  She has been compliant with knee brace in extension.   Denies fevers, chills, chest pain, shortness of breath, and worsening leg swelling.    Review of Systems   Constitutional: Negative for chills and fever.   Cardiovascular: Negative for chest pain.   Respiratory: Negative for cough and shortness of breath.    Skin: Negative for color change, dry skin, itching, nail changes, poor wound healing and rash.   Musculoskeletal:        Right knee wound   Neurological: Negative for dizziness.   Psychiatric/Behavioral: Negative for altered mental status. The patient is not nervous/anxious.    All other systems reviewed and are negative.        Objective:      General    Constitutional: She is oriented to person, place,  and time. She appears well-developed and well-nourished. No distress.   HENT:   Head: Atraumatic.   Eyes: Conjunctivae are normal.   Cardiovascular: Normal rate.    Pulmonary/Chest: Effort normal.   Neurological: She is alert and oriented to person, place, and time.   Psychiatric: She has a normal mood and affect. Her behavior is normal.                   Assessment:       Encounter Diagnosis   Name Primary?    Right knee skin infection           Plan:       Discussed plan with the patient. At this time she will do daily dressing changes keeping compression on the area. She is to remain in the knee brace weight bearing as tolerated in brace. RICE. Antibiotic regime changed from Keflex and Doxycycline due to side effects to Clindamycin. Patient is to return to clinic in 1 week. If her symptoms worsen she is to go to the ED.

## 2022-04-25 LAB — BACTERIA SPEC ANAEROBE CULT: NORMAL

## 2022-04-27 ENCOUNTER — TELEPHONE (OUTPATIENT)
Dept: ORTHOPEDICS | Facility: CLINIC | Age: 77
End: 2022-04-27
Payer: MEDICARE

## 2022-04-27 ENCOUNTER — OFFICE VISIT (OUTPATIENT)
Dept: ORTHOPEDICS | Facility: CLINIC | Age: 77
End: 2022-04-27
Payer: MEDICARE

## 2022-04-27 VITALS — HEIGHT: 62 IN | WEIGHT: 171.94 LBS | TEMPERATURE: 97 F | BODY MASS INDEX: 31.64 KG/M2

## 2022-04-27 DIAGNOSIS — L08.9 RIGHT KNEE SKIN INFECTION: Primary | ICD-10-CM

## 2022-04-27 PROCEDURE — 1159F PR MEDICATION LIST DOCUMENTED IN MEDICAL RECORD: ICD-10-PCS | Mod: CPTII,S$GLB,, | Performed by: PHYSICIAN ASSISTANT

## 2022-04-27 PROCEDURE — 99213 PR OFFICE/OUTPT VISIT, EST, LEVL III, 20-29 MIN: ICD-10-PCS | Mod: S$GLB,,, | Performed by: PHYSICIAN ASSISTANT

## 2022-04-27 PROCEDURE — 1159F MED LIST DOCD IN RCRD: CPT | Mod: CPTII,S$GLB,, | Performed by: PHYSICIAN ASSISTANT

## 2022-04-27 PROCEDURE — 1125F PR PAIN SEVERITY QUANTIFIED, PAIN PRESENT: ICD-10-PCS | Mod: CPTII,S$GLB,, | Performed by: PHYSICIAN ASSISTANT

## 2022-04-27 PROCEDURE — 99213 OFFICE O/P EST LOW 20 MIN: CPT | Mod: S$GLB,,, | Performed by: PHYSICIAN ASSISTANT

## 2022-04-27 PROCEDURE — 1125F AMNT PAIN NOTED PAIN PRSNT: CPT | Mod: CPTII,S$GLB,, | Performed by: PHYSICIAN ASSISTANT

## 2022-04-27 PROCEDURE — 99999 PR PBB SHADOW E&M-EST. PATIENT-LVL III: CPT | Mod: PBBFAC,,, | Performed by: PHYSICIAN ASSISTANT

## 2022-04-27 PROCEDURE — 99999 PR PBB SHADOW E&M-EST. PATIENT-LVL III: ICD-10-PCS | Mod: PBBFAC,,, | Performed by: PHYSICIAN ASSISTANT

## 2022-04-27 NOTE — TELEPHONE ENCOUNTER
----- Message from Al Gray sent at 4/27/2022  1:52 PM CDT -----  Contact: NAYE ROSALES [[id number]]  Name of Who is Calling : NAYE ROSALES [[id number]]         What is the request in detail : Patient is running late for the scheduled appointment and is in route patient states he/she will be ( 15min) late          ## PLEASE CONTACT IF PATIENT NEEDS TO RESCHEDULE##                           Can the clinic reply by [facility] :          What Number to Call Back :  [phone] (mobile)

## 2022-04-27 NOTE — PROGRESS NOTES
Subjective:      Patient ID: Jocelyne Duncan is a 77 y.o. female.    Chief Complaint: No chief complaint on file.    HPI    oJcelyne Duncan is a 77 y.o. female with past medical history of GERD, HTN, arthritis, Rheumatoid Arthritis, who presented to the ED on 04/18/22 with a complaint of right knee pain and swelling. This is a patient who sustained a fall about 2 weeks ago. She was seen in the ED on 4/14 for increasing right knee swelling and redness.  She had an I&D and packing placement then for drainage of a hematoma.  She was seen again on 4/16 for packing removal, repeat I&D, and repeat packing placement.  She was told to follow-up with her PCP, but the PCP was uncomfortable so the patient returned to the ER for further evaluation.     Orthopedics was consulted 04/18/22 for evaluation of the right knee pain due to concerns for superficial infection or septic arthritis.  Patient reports that her pain and redness have improved since being seen 2 days ago.  After the packing was removed, the patient was able to fully range her knee and ambulate with minimal pain.  She denies purulent discharge and says the only fluid draining his clear red fluid.  She has taking doxycycline since 04/14 and Keflex x 1 day.  She has been compliant with knee brace in extension.   Denies fevers, chills, chest pain, shortness of breath, and worsening leg swelling.      04/27/22: Over all doing better. She has been taking the antibiotics and has continued improvement in her knee. She has been compliant with the knee brace. Denied fever or chills or drainage from incision.     Review of Systems   Constitutional: Negative for chills and fever.   Cardiovascular: Negative for chest pain.   Respiratory: Negative for cough and shortness of breath.    Skin: Negative for color change, dry skin, itching, nail changes, poor wound healing and rash.   Musculoskeletal:        Right knee wound   Neurological: Negative for dizziness.    Psychiatric/Behavioral: Negative for altered mental status. The patient is not nervous/anxious.    All other systems reviewed and are negative.        Objective:      General    Constitutional: She is oriented to person, place, and time. She appears well-developed and well-nourished. No distress.   HENT:   Head: Atraumatic.   Eyes: Conjunctivae are normal.   Cardiovascular: Normal rate.    Pulmonary/Chest: Effort normal.   Neurological: She is alert and oriented to person, place, and time.   Psychiatric: She has a normal mood and affect. Her behavior is normal.                       Assessment:       Encounter Diagnosis   Name Primary?    Right knee skin infection Yes          Plan:       Discussed plan with the patient.  - she may wash the area with antibacterial soap in the shower. Will not submerge until the incision is completely healed  -Patient was advised to monitor wound closely and multiple times daily for any problems. Call clinic immediately or report to ED for immediate medical attention for any complications including reopening of wound, drainage, purulence, redness, streaking, odor, pain out of proportion, fever, chills, etc.   - will continue antibiotics until completed.   - ok to d./c knee brace weight bearing as tolerated, ROMAT, activity as tolerated.   - will return to clinic if any return of symptoms. .

## 2022-05-18 LAB — FUNGUS SPEC CULT: NORMAL

## 2022-06-06 LAB
ACID FAST MOD KINY STN SPEC: NORMAL
MYCOBACTERIUM SPEC QL CULT: NORMAL

## 2022-06-14 ENCOUNTER — HOSPITAL ENCOUNTER (EMERGENCY)
Facility: HOSPITAL | Age: 77
Discharge: HOME | End: 2022-06-15
Attending: EMERGENCY MEDICINE
Payer: MEDICARE

## 2022-06-14 DIAGNOSIS — J06.9 UPPER RESPIRATORY TRACT INFECTION, UNSPECIFIED TYPE: ICD-10-CM

## 2022-06-14 DIAGNOSIS — R06.02 SHORTNESS OF BREATH: ICD-10-CM

## 2022-06-14 DIAGNOSIS — R06.02 SHORT OF BREATH ON EXERTION: ICD-10-CM

## 2022-06-14 DIAGNOSIS — R05.9 COUGH: Primary | ICD-10-CM

## 2022-06-14 LAB
ALBUMIN SERPL BCP-MCNC: 3.8 G/DL (ref 3.5–5.2)
ALP SERPL-CCNC: 72 U/L (ref 55–135)
ALT SERPL W/O P-5'-P-CCNC: 15 U/L (ref 10–44)
ANION GAP SERPL CALC-SCNC: 15 MMOL/L (ref 8–16)
AST SERPL-CCNC: 17 U/L (ref 10–40)
BASOPHILS # BLD AUTO: 0.02 K/UL (ref 0–0.2)
BASOPHILS NFR BLD: 0.3 % (ref 0–1.9)
BILIRUB SERPL-MCNC: 0.3 MG/DL (ref 0.1–1)
BNP SERPL-MCNC: 77 PG/ML (ref 0–99)
BUN SERPL-MCNC: 25 MG/DL (ref 8–23)
CALCIUM SERPL-MCNC: 9 MG/DL (ref 8.7–10.5)
CHLORIDE SERPL-SCNC: 98 MMOL/L (ref 95–110)
CO2 SERPL-SCNC: 22 MMOL/L (ref 23–29)
CREAT SERPL-MCNC: 1 MG/DL (ref 0.5–1.4)
CTP QC/QA: YES
CTP QC/QA: YES
DIFFERENTIAL METHOD: ABNORMAL
EOSINOPHIL # BLD AUTO: 0 K/UL (ref 0–0.5)
EOSINOPHIL NFR BLD: 0.5 % (ref 0–8)
ERYTHROCYTE [DISTWIDTH] IN BLOOD BY AUTOMATED COUNT: 16 % (ref 11.5–14.5)
EST. GFR  (AFRICAN AMERICAN): >60 ML/MIN/1.73 M^2
EST. GFR  (NON AFRICAN AMERICAN): 54.9 ML/MIN/1.73 M^2
GLUCOSE SERPL-MCNC: 176 MG/DL (ref 70–110)
HCT VFR BLD AUTO: 37.4 % (ref 37–48.5)
HGB BLD-MCNC: 11.8 G/DL (ref 12–16)
IMM GRANULOCYTES # BLD AUTO: 0.03 K/UL (ref 0–0.04)
IMM GRANULOCYTES NFR BLD AUTO: 0.5 % (ref 0–0.5)
LYMPHOCYTES # BLD AUTO: 1 K/UL (ref 1–4.8)
LYMPHOCYTES NFR BLD: 16.1 % (ref 18–48)
MCH RBC QN AUTO: 29.2 PG (ref 27–31)
MCHC RBC AUTO-ENTMCNC: 31.6 G/DL (ref 32–36)
MCV RBC AUTO: 93 FL (ref 82–98)
MONOCYTES # BLD AUTO: 0.5 K/UL (ref 0.3–1)
MONOCYTES NFR BLD: 8 % (ref 4–15)
NEUTROPHILS # BLD AUTO: 4.5 K/UL (ref 1.8–7.7)
NEUTROPHILS NFR BLD: 74.6 % (ref 38–73)
NRBC BLD-RTO: 0 /100 WBC
PLATELET # BLD AUTO: 263 K/UL (ref 150–450)
PMV BLD AUTO: 9.8 FL (ref 9.2–12.9)
POC MOLECULAR INFLUENZA A AGN: NEGATIVE
POC MOLECULAR INFLUENZA B AGN: NEGATIVE
POTASSIUM SERPL-SCNC: 4 MMOL/L (ref 3.5–5.1)
PROT SERPL-MCNC: 6.9 G/DL (ref 6–8.4)
RBC # BLD AUTO: 4.04 M/UL (ref 4–5.4)
SARS-COV-2 RDRP RESP QL NAA+PROBE: NEGATIVE
SODIUM SERPL-SCNC: 135 MMOL/L (ref 136–145)
TROPONIN I SERPL DL<=0.01 NG/ML-MCNC: <0.006 NG/ML (ref 0–0.03)
WBC # BLD AUTO: 6.01 K/UL (ref 3.9–12.7)

## 2022-06-14 PROCEDURE — 25000242 PHARM REV CODE 250 ALT 637 W/ HCPCS: Performed by: PHYSICIAN ASSISTANT

## 2022-06-14 PROCEDURE — 94640 AIRWAY INHALATION TREATMENT: CPT

## 2022-06-14 PROCEDURE — 99284 PR EMERGENCY DEPT VISIT,LEVEL IV: ICD-10-PCS | Mod: CS,,, | Performed by: PHYSICIAN ASSISTANT

## 2022-06-14 PROCEDURE — 84484 ASSAY OF TROPONIN QUANT: CPT | Performed by: PHYSICIAN ASSISTANT

## 2022-06-14 PROCEDURE — 99284 EMERGENCY DEPT VISIT MOD MDM: CPT | Mod: CS,,, | Performed by: PHYSICIAN ASSISTANT

## 2022-06-14 PROCEDURE — 94761 N-INVAS EAR/PLS OXIMETRY MLT: CPT

## 2022-06-14 PROCEDURE — 99900035 HC TECH TIME PER 15 MIN (STAT)

## 2022-06-14 PROCEDURE — 93010 ELECTROCARDIOGRAM REPORT: CPT | Mod: ,,, | Performed by: INTERNAL MEDICINE

## 2022-06-14 PROCEDURE — 94760 N-INVAS EAR/PLS OXIMETRY 1: CPT

## 2022-06-14 PROCEDURE — 99900031 HC PATIENT EDUCATION (STAT)

## 2022-06-14 PROCEDURE — 93010 EKG 12-LEAD: ICD-10-PCS | Mod: ,,, | Performed by: INTERNAL MEDICINE

## 2022-06-14 PROCEDURE — U0002 COVID-19 LAB TEST NON-CDC: HCPCS | Performed by: PHYSICIAN ASSISTANT

## 2022-06-14 PROCEDURE — 99285 EMERGENCY DEPT VISIT HI MDM: CPT | Mod: 25

## 2022-06-14 PROCEDURE — 80053 COMPREHEN METABOLIC PANEL: CPT | Performed by: PHYSICIAN ASSISTANT

## 2022-06-14 PROCEDURE — 93005 ELECTROCARDIOGRAM TRACING: CPT

## 2022-06-14 PROCEDURE — 87502 INFLUENZA DNA AMP PROBE: CPT

## 2022-06-14 PROCEDURE — 83880 ASSAY OF NATRIURETIC PEPTIDE: CPT | Performed by: PHYSICIAN ASSISTANT

## 2022-06-14 PROCEDURE — 85025 COMPLETE CBC W/AUTO DIFF WBC: CPT | Performed by: PHYSICIAN ASSISTANT

## 2022-06-14 PROCEDURE — 86803 HEPATITIS C AB TEST: CPT | Performed by: EMERGENCY MEDICINE

## 2022-06-14 RX ORDER — FLUTICASONE PROPIONATE 50 MCG
1 SPRAY, SUSPENSION (ML) NASAL 2 TIMES DAILY PRN
Qty: 15 G | Refills: 0 | Status: SHIPPED | OUTPATIENT
Start: 2022-06-14

## 2022-06-14 RX ORDER — IPRATROPIUM BROMIDE AND ALBUTEROL SULFATE 2.5; .5 MG/3ML; MG/3ML
3 SOLUTION RESPIRATORY (INHALATION)
Status: COMPLETED | OUTPATIENT
Start: 2022-06-14 | End: 2022-06-14

## 2022-06-14 RX ORDER — BENZONATATE 100 MG/1
100 CAPSULE ORAL 3 TIMES DAILY PRN
Qty: 20 CAPSULE | Refills: 0 | Status: SHIPPED | OUTPATIENT
Start: 2022-06-14 | End: 2022-06-24 | Stop reason: SDUPTHER

## 2022-06-14 RX ADMIN — IPRATROPIUM BROMIDE AND ALBUTEROL SULFATE 3 ML: 2.5; .5 SOLUTION RESPIRATORY (INHALATION) at 10:06

## 2022-06-15 VITALS
TEMPERATURE: 98 F | RESPIRATION RATE: 18 BRPM | SYSTOLIC BLOOD PRESSURE: 146 MMHG | HEIGHT: 61 IN | WEIGHT: 170 LBS | BODY MASS INDEX: 32.1 KG/M2 | DIASTOLIC BLOOD PRESSURE: 82 MMHG | OXYGEN SATURATION: 99 % | HEART RATE: 77 BPM

## 2022-06-15 LAB — HCV AB SERPL QL IA: NEGATIVE

## 2022-06-15 NOTE — DISCHARGE INSTRUCTIONS
Please follow-up with pulmonologist.  Continue your inhaler as prescribed.  Take cough medication as prescribed.  Return to the ER if her symptoms persist or worsen.

## 2022-06-15 NOTE — ED PROVIDER NOTES
Encounter Date: 6/14/2022       History     Chief Complaint   Patient presents with    Shortness of Breath     Pt reports she has hx of asthma and she has been feeling sob. Pt reports increased use of at home inhaler.      77 year old female with history of rheumatoid arthritis on methotrexate and prednisone, hypertension, GERD, asthma presents the ER for evaluation of shortness of breath.  Patient reports symptoms have been ongoing since Saturday.  She states she does intermittently feel short of breath.  She has been using her albuterol inhaler and Breo with only slight alleviation of her symptoms.  She denies any associated chest tightness or pain.  She reports hearing wheezing intermittently.  She has had a runny nose which she states is secondary to her seasonal allergies.  She has however noticed some worsening nasal congestion and minimal cough that is slightly productive.  No fevers or chills at home.  Does not use supplemental O2 at home.  Denies any cardiac history including stent placement.  Does not have a history of CHF.  Does not use blood thinners.    The history is provided by the patient.     Review of patient's allergies indicates:   Allergen Reactions    Iodine      Swelling (throat)^  Unsure of what drug/food triggered the reaction      Adhesive Itching    Penicillins      Past Medical History:   Diagnosis Date    Arthritis     GERD (gastroesophageal reflux disease)     HTN (hypertension)     Premature atrial complexes     Rheumatoid arthritis      Past Surgical History:   Procedure Laterality Date    LUMBAR SPINE SURGERY       Family History   Problem Relation Age of Onset    Breast cancer Neg Hx     Colon cancer Neg Hx     Ovarian cancer Neg Hx      Social History     Tobacco Use    Smoking status: Never Smoker    Smokeless tobacco: Never Used   Substance Use Topics    Alcohol use: Yes     Comment: occ    Drug use: Never     Review of Systems   Constitutional: Negative for  chills and fever.   HENT: Positive for congestion and postnasal drip.    Eyes: Negative for visual disturbance.   Respiratory: Positive for cough and shortness of breath.    Cardiovascular: Negative for chest pain and palpitations.   Gastrointestinal: Negative for abdominal pain, nausea and vomiting.   Genitourinary: Negative for dysuria and flank pain.   Musculoskeletal: Negative for myalgias.   Skin: Negative for rash.   Allergic/Immunologic: Negative for immunocompromised state.   Neurological: Negative for weakness and numbness.   Hematological: Does not bruise/bleed easily.   Psychiatric/Behavioral: Negative for confusion.       Physical Exam     Initial Vitals [06/14/22 2021]   BP Pulse Resp Temp SpO2   (!) 156/59 78 20 98.3 °F (36.8 °C) 97 %      MAP       --         Physical Exam    Vitals reviewed.  Constitutional: She appears well-developed and well-nourished. She is not diaphoretic. No distress.   HENT:   Head: Normocephalic and atraumatic.   Nose: Mucosal edema (Bilateral nares) present.   Mouth/Throat: Oropharynx is clear and moist.   Eyes: Conjunctivae and EOM are normal.   Neck: Neck supple.   Cardiovascular: Normal rate, regular rhythm, normal heart sounds and intact distal pulses.   Pulmonary/Chest: No respiratory distress. She has decreased breath sounds.   Musculoskeletal:         General: Normal range of motion.      Cervical back: Neck supple.     Neurological: She is alert and oriented to person, place, and time.   Skin: Skin is warm.         ED Course   Procedures  Labs Reviewed   COMPREHENSIVE METABOLIC PANEL - Abnormal; Notable for the following components:       Result Value    Sodium 135 (*)     CO2 22 (*)     Glucose 176 (*)     BUN 25 (*)     eGFR if non  54.9 (*)     All other components within normal limits   CBC W/ AUTO DIFFERENTIAL - Abnormal; Notable for the following components:    Hemoglobin 11.8 (*)     MCHC 31.6 (*)     RDW 16.0 (*)     Gran % 74.6 (*)     Lymph  % 16.1 (*)     All other components within normal limits   TROPONIN I   B-TYPE NATRIURETIC PEPTIDE   HEPATITIS C ANTIBODY   SARS-COV-2 RDRP GENE    Narrative:     This test utilizes isothermal nucleic acid amplification   technology to detect the SARS-CoV-2 RdRp nucleic acid segment.   The analytical sensitivity (limit of detection) is 125 genome   equivalents/mL.   A POSITIVE result implies infection with the SARS-CoV-2 virus;   the patient is presumed to be contagious.     A NEGATIVE result means that SARS-CoV-2 nucleic acids are not   present above the limit of detection. A NEGATIVE result should be   treated as presumptive. It does not rule out the possibility of   COVID-19 and should not be the sole basis for treatment decisions.   If COVID-19 is strongly suspected based on clinical and exposure   history, re-testing using an alternate molecular assay should be   considered.   This test is only for use under the Food and Drug   Administration s Emergency Use Authorization (EUA).   Commercial kits are provided by [employer].   Performance characteristics of the EUA have been independently   verified by [facility] Department of   Pathology and Laboratory Medicine.   _________________________________________________________________   The authorized Fact Sheet for Healthcare Providers and the authorized Fact   Sheet for Patients of the ID NOW COVID-19 are available on the FDA   website:     https://www.fda.gov/media/337665/download  https://www.fda.gov/media/216980/download          POCT INFLUENZA A/B MOLECULAR          Imaging Results          X-Ray Chest AP Portable (Final result)  Result time 06/14/22 23:38:46    Final result by Rajat Coleman MD (06/14/22 23:38:46)                 Impression:      There is no radiographic evidence for acute intrathoracic process.      Electronically signed by: Rajat Coleman  Date:    06/14/2022  Time:    23:38             Narrative:    EXAMINATION:  XR  CHEST AP PORTABLE    CLINICAL HISTORY:  Shortness of breath    TECHNIQUE:  Single frontal view of the chest was performed.    COMPARISON:  Chest radiograph August 1, 2019    FINDINGS:  Single portable chest view is submitted.  The cardiomediastinal silhouette appears appropriate.  There is no evidence for confluent infiltrate or consolidation, significant pleural effusion or pneumothorax.  Nodule at the right lung base peripherally is thought to represent a granuloma, and appears stable.  The osseous structures demonstrate mild chronic change.                                 Medications   albuterol-ipratropium 2.5 mg-0.5 mg/3 mL nebulizer solution 3 mL (3 mLs Nebulization Given 6/14/22 2220)           APC / Resident Notes:   Patient seen in the ER promptly upon arrival.  She is afebrile, no acute distress.  Physical examination reveals slightly diminished breath sounds bilaterally.  No wheezing, rhonchi or rales.  Abdomen soft, nondistended, nontender.  Patient is otherwise able to speak in complete full sentences without difficulty.  O2 saturation of 97% on room air.  She was placed on the cardiac monitor.  IV access established, labs ordered.  Patient will be given DuoNeb treatment.  COVID and flu test obtained.    Last echo 2/28/22: wnl.      Lab work today shows normal white count of 6.0.  Hemoglobin is stable.  Chemistries unremarkable.  Influenza COVID test negative.  Cardiac workup essentially unremarkable.  X-ray of chest is unremarkable for acute pathology.    On reassessment patient is resting comfortably.  Today's workup was fairly unremarkable.  Possible mild asthma versus questionable COPD exacerbation as patient does not have a full diagnosis yet.  Will advise her to follow-up with the pulmonologist to obtain PFT for diagnosis.  She was however given strict return precautions ED.  Will advise patient to take over-the-counter cough and cold medication for her URI symptoms.  Patient is otherwise stable  for discharge and close follow-up at this time.    Disclaimer: This note has been generated using voice-recognition software. There may be typographical errors that have been missed during proof-reading.                   Clinical Impression:   Final diagnoses:  [R06.02] Shortness of breath  [R06.02] Short of breath on exertion  [R05.9] Cough (Primary)  [J06.9] Upper respiratory tract infection, unspecified type          ED Disposition Condition    Discharge Stable        ED Prescriptions     Medication Sig Dispense Start Date End Date Auth. Provider    fluticasone propionate (FLONASE) 50 mcg/actuation nasal spray 1 spray (50 mcg total) by Each Nostril route 2 (two) times daily as needed for Rhinitis. 15 g 6/14/2022  Lili Thomson PA-C    benzonatate (TESSALON) 100 MG capsule Take 1 capsule (100 mg total) by mouth 3 (three) times daily as needed for Cough. 20 capsule 6/14/2022 6/24/2022 Lili Thomson PA-C        Follow-up Information     Follow up With Specialties Details Why Contact Info Additional Information    [facility]   [address]  [place] [address]  [phone]       [facility]   [address]  [facility] [address] [phone] Main Building, 9th Floor Please park in [facility] and use Clinic elevator           Lili Thomson PA-C  06/15/22 0001

## 2022-06-15 NOTE — ED TRIAGE NOTES
Past Medical History:   Diagnosis Date    Arthritis     GERD (gastroesophageal reflux disease)     HTN (hypertension)     Premature atrial complexes     Rheumatoid arthritis       Pt with above pmh presents with c/o asthma exacerbation. She reports last week she started needing her albuterol and not having any relief, feeling tightness in her chest, denies ever needing to be hospitalized for asthma. She denies and cp, f/c, occ sob. She is able to speak in complete sentences.

## 2022-06-24 ENCOUNTER — HOSPITAL ENCOUNTER (EMERGENCY)
Facility: HOSPITAL | Age: 77
Discharge: HOME | End: 2022-06-24
Attending: EMERGENCY MEDICINE
Payer: MEDICARE

## 2022-06-24 VITALS
DIASTOLIC BLOOD PRESSURE: 71 MMHG | OXYGEN SATURATION: 100 % | BODY MASS INDEX: 34.36 KG/M2 | WEIGHT: 175 LBS | TEMPERATURE: 98 F | SYSTOLIC BLOOD PRESSURE: 153 MMHG | HEART RATE: 69 BPM | RESPIRATION RATE: 16 BRPM | HEIGHT: 60 IN

## 2022-06-24 DIAGNOSIS — R05.9 COUGH: Primary | ICD-10-CM

## 2022-06-24 LAB
CTP QC/QA: YES
SARS-COV-2 RDRP RESP QL NAA+PROBE: NEGATIVE

## 2022-06-24 PROCEDURE — U0002 COVID-19 LAB TEST NON-CDC: HCPCS | Performed by: EMERGENCY MEDICINE

## 2022-06-24 PROCEDURE — 99284 EMERGENCY DEPT VISIT MOD MDM: CPT | Mod: CR,CS,, | Performed by: EMERGENCY MEDICINE

## 2022-06-24 PROCEDURE — 99283 EMERGENCY DEPT VISIT LOW MDM: CPT | Mod: 25

## 2022-06-24 PROCEDURE — 99284 PR EMERGENCY DEPT VISIT,LEVEL IV: ICD-10-PCS | Mod: CR,CS,, | Performed by: EMERGENCY MEDICINE

## 2022-06-24 RX ORDER — BENZONATATE 100 MG/1
100 CAPSULE ORAL 3 TIMES DAILY PRN
Qty: 20 CAPSULE | Refills: 0 | Status: SHIPPED | OUTPATIENT
Start: 2022-06-24 | End: 2022-07-04

## 2022-06-24 NOTE — ED NOTES
Discharge home with family, spouse also a patient, states understanding to follow up as directed. Ambulates out of ED without difficulty.

## 2022-06-24 NOTE — ED PROVIDER NOTES
Encounter Date: 6/24/2022    SCRIBE #1 NOTE: I, Nelia Corralesandrae, am scribing for, and in the presence of,  Bandar Landon MD. I have scribed the entire note.       History     Chief Complaint   Patient presents with    COVID-19 Concerns     77 y.o. female  presents with a chief complaint of COVID-19 concerns onset today. She notes her spouse tested positive for COVID-19 in the ED today and wanted to be checked. She experiences a slight cough, but she is otherwise fine. Patient reports no other identifying, alleviating, or exacerbating factors and denies fever,  or any other associated symptoms at this time.            The history is provided by the patient and medical records. No  was used.     Review of patient's allergies indicates:   Allergen Reactions    Iodine      Swelling (throat)^  Unsure of what drug/food triggered the reaction      Adhesive Itching    Penicillins      Past Medical History:   Diagnosis Date    Arthritis     GERD (gastroesophageal reflux disease)     HTN (hypertension)     Premature atrial complexes     Rheumatoid arthritis      Past Surgical History:   Procedure Laterality Date    LUMBAR SPINE SURGERY       Family History   Problem Relation Age of Onset    Breast cancer Neg Hx     Colon cancer Neg Hx     Ovarian cancer Neg Hx      Social History     Tobacco Use    Smoking status: Never Smoker    Smokeless tobacco: Never Used   Substance Use Topics    Alcohol use: Yes     Comment: occ    Drug use: Never     Review of Systems   Constitutional: Negative for chills and fever.   HENT: Negative for nosebleeds.    Eyes: Negative for visual disturbance.   Respiratory: Positive for cough. Negative for shortness of breath.    Cardiovascular: Negative for chest pain.   Gastrointestinal: Negative for vomiting.   Genitourinary: Negative for frequency.   Musculoskeletal: Negative for joint swelling.   Skin: Negative for rash.   Neurological: Negative for numbness.    Hematological: Does not bruise/bleed easily.   Psychiatric/Behavioral: Negative for confusion.       Physical Exam     Initial Vitals [06/24/22 1515]   BP Pulse Resp Temp SpO2   (!) 153/71 69 16 98.1 °F (36.7 °C) 100 %      MAP       --         Physical Exam    Constitutional: She appears well-developed and well-nourished.   HENT:   Head: Normocephalic and atraumatic.   Eyes: Conjunctivae, EOM and lids are normal. Pupils are equal, round, and reactive to light.   Neck: Trachea normal.   Normal range of motion.   Full passive range of motion without pain.     Pulmonary/Chest: Breath sounds normal.   Musculoskeletal:         General: Normal range of motion.      Cervical back: Full passive range of motion without pain and normal range of motion.     Neurological: She is alert.   Skin: Skin is intact.   Psychiatric: She has a normal mood and affect. Her speech is normal and behavior is normal. Judgment and thought content normal.         ED Course   Procedures  Labs Reviewed   SARS-COV-2 RDRP GENE    Narrative:     This test utilizes isothermal nucleic acid amplification   technology to detect the SARS-CoV-2 RdRp nucleic acid segment.   The analytical sensitivity (limit of detection) is 125 genome   equivalents/mL.   A POSITIVE result implies infection with the SARS-CoV-2 virus;   the patient is presumed to be contagious.     A NEGATIVE result means that SARS-CoV-2 nucleic acids are not   present above the limit of detection. A NEGATIVE result should be   treated as presumptive. It does not rule out the possibility of   COVID-19 and should not be the sole basis for treatment decisions.   If COVID-19 is strongly suspected based on clinical and exposure   history, re-testing using an alternate molecular assay should be   considered.   This test is only for use under the Food and Drug   Administration s Emergency Use Authorization (EUA).   Commercial kits are provided by [employer].   Performance characteristics  of the EUA have been independently   verified by [facility] Department of   Pathology and Laboratory Medicine.   _________________________________________________________________   The authorized Fact Sheet for Healthcare Providers and the authorized Fact   Sheet for Patients of the ID NOW COVID-19 are available on the FDA   website:     https://www.fda.gov/media/267719/download  https://www.fda.gov/media/306016/download                  Imaging Results    None          Medications - No data to display  Medical Decision Making:   History:   Old Medical Records: I decided to obtain old medical records.  Initial Assessment:   77 year old female presents to the ED for COVID-19 concerns. She is well appearing with covid epoure. I will conduct a COVID-19 screening     Clinical Tests:   Lab Tests: Reviewed and Ordered  ED Management:  3:53 PM  Patients' COVID-19 screening was negative.           Scribe Attestation:   Scribe #1: I performed the above scribed service and the documentation accurately describes the services I performed. I attest to the accuracy of the note.                 Clinical Impression:   Final diagnoses:  [R05.9] Cough (Primary)          ED Disposition Condition    Discharge Stable        ED Prescriptions     Medication Sig Dispense Start Date End Date Auth. Provider    benzonatate (TESSALON) 100 MG capsule Take 1 capsule (100 mg total) by mouth 3 (three) times daily as needed for Cough. 20 capsule 6/24/2022 7/4/2022 Bandar Landon MD    benzonatate (TESSALON) 100 MG capsule Take 1 capsule (100 mg total) by mouth 3 (three) times daily as needed for Cough. 20 capsule 6/24/2022 7/4/2022 Bandar Landon MD        Follow-up Information     Follow up With Specialties Details Why Contact Info    [facility] Schedule an appointment as soon as possible for a visit   [address] [place] [address]  [phone]      [facility] - Emergency Dept Emergency Medicine  If  symptoms worsen [address]  [facility] [address]  [phone]           Bandar Landon MD  06/24/22 0527

## 2022-06-24 NOTE — ED NOTES
"Patient identifiers verified and correct for Jocelyne Trusty  C/C: Pt states "I just want to get a covid swab"  APPEARANCE: awake and alert in no acute distress.  SKIN: warm, dry and intact. No breakdown or bruising.  MUSCULOSKELETAL: Patient moving all extremities spontaneously, no obvious swelling or deformities noted. Ambulates independently.  RESPIRATORY: Denies shortness of breath. Respirations unlabored.   CARDIAC: Denies CP, 2+ distal pulses; no peripheral edema  ABDOMEN: soft, non-tender, and non-distended, Denies N/V  : voids spontaneously, denies difficulty  Neurologic: AAO x 4; follows commands equal strength in all extremities; denies numbness/tingling. Denies dizziness   "

## 2022-06-29 ENCOUNTER — TELEPHONE (OUTPATIENT)
Dept: PULMONOLOGY | Facility: CLINIC | Age: 77
End: 2022-06-29
Payer: MEDICARE

## 2022-06-29 NOTE — TELEPHONE ENCOUNTER
----- Message from Michelle Haas sent at 6/29/2022  1:22 PM CDT -----  Contact: pt  Pt calling to schedule appt , nothing in epic . Please call       Confirmed patient's contact info below:  Contact Name: Jocelyne Duncan  Phone Number: [phone]

## 2022-06-29 NOTE — TELEPHONE ENCOUNTER
Spoke with patient, informed her that I have received her message. Patient states that she is having Shortness Of Breath and wants to visit with a Pulmonologist here at [facility]. I verbalized to patient that I understand and advised patient that I can schedule her appointment with Dr Coffman on 8/24/22 for 3:00 pm and also place patient on wait list. Patient verbalized that she understand and accepted the appointment.

## 2022-08-24 ENCOUNTER — OFFICE VISIT (OUTPATIENT)
Dept: PULMONOLOGY | Facility: CLINIC | Age: 77
End: 2022-08-24
Payer: MEDICARE

## 2022-08-24 VITALS
WEIGHT: 170.44 LBS | HEART RATE: 75 BPM | BODY MASS INDEX: 33.46 KG/M2 | SYSTOLIC BLOOD PRESSURE: 139 MMHG | HEIGHT: 60 IN | DIASTOLIC BLOOD PRESSURE: 76 MMHG | OXYGEN SATURATION: 97 %

## 2022-08-24 DIAGNOSIS — J84.9 INTERSTITIAL PULMONARY DISEASE, UNSPECIFIED: ICD-10-CM

## 2022-08-24 DIAGNOSIS — R06.02 SHORTNESS OF BREATH: Primary | ICD-10-CM

## 2022-08-24 DIAGNOSIS — M06.9 RHEUMATOID ARTHRITIS, INVOLVING UNSPECIFIED SITE, UNSPECIFIED WHETHER RHEUMATOID FACTOR PRESENT: ICD-10-CM

## 2022-08-24 PROCEDURE — 99204 PR OFFICE/OUTPT VISIT, NEW, LEVL IV, 45-59 MIN: ICD-10-PCS | Mod: S$GLB,,, | Performed by: INTERNAL MEDICINE

## 2022-08-24 PROCEDURE — 1100F PR PT FALLS ASSESS DOC 2+ FALLS/FALL W/INJURY/YR: ICD-10-PCS | Mod: CPTII,S$GLB,, | Performed by: INTERNAL MEDICINE

## 2022-08-24 PROCEDURE — 3075F PR MOST RECENT SYSTOLIC BLOOD PRESS GE 130-139MM HG: ICD-10-PCS | Mod: CPTII,S$GLB,, | Performed by: INTERNAL MEDICINE

## 2022-08-24 PROCEDURE — 3078F DIAST BP <80 MM HG: CPT | Mod: CPTII,S$GLB,, | Performed by: INTERNAL MEDICINE

## 2022-08-24 PROCEDURE — 1159F PR MEDICATION LIST DOCUMENTED IN MEDICAL RECORD: ICD-10-PCS | Mod: CPTII,S$GLB,, | Performed by: INTERNAL MEDICINE

## 2022-08-24 PROCEDURE — 3288F PR FALLS RISK ASSESSMENT DOCUMENTED: ICD-10-PCS | Mod: CPTII,S$GLB,, | Performed by: INTERNAL MEDICINE

## 2022-08-24 PROCEDURE — 1126F PR PAIN SEVERITY QUANTIFIED, NO PAIN PRESENT: ICD-10-PCS | Mod: CPTII,S$GLB,, | Performed by: INTERNAL MEDICINE

## 2022-08-24 PROCEDURE — 3078F PR MOST RECENT DIASTOLIC BLOOD PRESSURE < 80 MM HG: ICD-10-PCS | Mod: CPTII,S$GLB,, | Performed by: INTERNAL MEDICINE

## 2022-08-24 PROCEDURE — 1100F PTFALLS ASSESS-DOCD GE2>/YR: CPT | Mod: CPTII,S$GLB,, | Performed by: INTERNAL MEDICINE

## 2022-08-24 PROCEDURE — 1159F MED LIST DOCD IN RCRD: CPT | Mod: CPTII,S$GLB,, | Performed by: INTERNAL MEDICINE

## 2022-08-24 PROCEDURE — 99999 PR PBB SHADOW E&M-EST. PATIENT-LVL IV: CPT | Mod: PBBFAC,,, | Performed by: INTERNAL MEDICINE

## 2022-08-24 PROCEDURE — 3075F SYST BP GE 130 - 139MM HG: CPT | Mod: CPTII,S$GLB,, | Performed by: INTERNAL MEDICINE

## 2022-08-24 PROCEDURE — 1126F AMNT PAIN NOTED NONE PRSNT: CPT | Mod: CPTII,S$GLB,, | Performed by: INTERNAL MEDICINE

## 2022-08-24 PROCEDURE — 99999 PR PBB SHADOW E&M-EST. PATIENT-LVL IV: ICD-10-PCS | Mod: PBBFAC,,, | Performed by: INTERNAL MEDICINE

## 2022-08-24 PROCEDURE — 3288F FALL RISK ASSESSMENT DOCD: CPT | Mod: CPTII,S$GLB,, | Performed by: INTERNAL MEDICINE

## 2022-08-24 PROCEDURE — 99204 OFFICE O/P NEW MOD 45 MIN: CPT | Mod: S$GLB,,, | Performed by: INTERNAL MEDICINE

## 2022-08-24 RX ORDER — ALBUTEROL SULFATE 90 UG/1
2 AEROSOL, METERED RESPIRATORY (INHALATION) EVERY 6 HOURS PRN
COMMUNITY
Start: 2022-06-29 | End: 2024-01-24 | Stop reason: SDUPTHER

## 2022-08-24 RX ORDER — GABAPENTIN 100 MG/1
100 CAPSULE ORAL 3 TIMES DAILY
COMMUNITY
Start: 2022-07-14

## 2022-08-24 RX ORDER — PREDNISONE 1 MG/1
2 TABLET ORAL EVERY MORNING
COMMUNITY
Start: 2022-04-05

## 2022-08-24 RX ORDER — BENZONATATE 100 MG/1
100 CAPSULE ORAL 3 TIMES DAILY PRN
COMMUNITY
Start: 2022-06-15 | End: 2023-10-22

## 2022-08-24 NOTE — PROGRESS NOTES
Subjective:     Reason for visit: shortness of breath     Patient ID:  Jocelyne Duncan is a 77 y.o. female    Interval History:  76 yo with RA and previously told she had asthma that presents for evaluation of her shortness of breath. Her symptoms have been present for some time and she was recently started on breo which seems to help a lot. She has issues with exertion that cause shortness of breath but has also started noticing some shortness of breath at rest. IT has improved since starting the breo but is still present. She also notes a strange tightness in her chest that is intermittent and typically gets better without any interventions.     Review of Systems   Constitutional: Negative for fever, malaise/fatigue and weight loss.   HENT: Negative for congestion, ear discharge, ear pain and nosebleeds.    Eyes: Negative for blurred vision, pain and discharge.   Respiratory: Positive for shortness of breath.    Cardiovascular: Positive for chest pain, palpitations and orthopnea.   Gastrointestinal: Positive for heartburn. Negative for abdominal pain, constipation and nausea.   Genitourinary: Negative.    Musculoskeletal: Positive for back pain, falls and joint pain. Negative for myalgias and neck pain.   Skin: Negative for itching and rash.   Neurological: Negative for dizziness, tremors, sensory change and weakness.   Endo/Heme/Allergies: Negative for environmental allergies. Bruises/bleeds easily.   Psychiatric/Behavioral: Negative.         Objective:     Vitals:    08/24/22 1400   BP: 139/76   BP Location: Left arm   Patient Position: Sitting   Pulse: 75   SpO2: 97%   Weight: 77.3 kg (170 lb 6.7 oz)   Height: 5' (1.524 m)         Physical Exam  Vitals and nursing note reviewed.   Constitutional:       General: She is not in acute distress.     Appearance: She is not diaphoretic.   HENT:      Head: Normocephalic and atraumatic.      Right Ear: External ear normal.      Left Ear: External ear normal.   Eyes:       Conjunctiva/sclera: Conjunctivae normal.      Pupils: Pupils are equal, round, and reactive to light.   Neck:      Trachea: No tracheal deviation.   Cardiovascular:      Rate and Rhythm: Normal rate and regular rhythm.      Heart sounds: Normal heart sounds. No murmur heard.  Pulmonary:      Effort: Pulmonary effort is normal. No respiratory distress.      Breath sounds: No stridor. Rales present. No wheezing.   Abdominal:      General: Bowel sounds are normal. There is no distension.      Palpations: Abdomen is soft.      Tenderness: There is no abdominal tenderness.   Musculoskeletal:         General: Deformity (arthritis of both hands ) present. Normal range of motion.      Cervical back: Normal range of motion and neck supple.   Skin:     General: Skin is warm and dry.      Findings: No erythema.   Neurological:      Mental Status: She is alert and oriented to person, place, and time.      Gait: Gait is intact.   Psychiatric:         Mood and Affect: Mood and affect normal.         Cognition and Memory: Memory normal.         Judgment: Judgment normal.                Pertinent Studies Reviewed and Interpreted:     Pulmonary Function Tests:   Restriction on PFTs today     6 Minute Walk Tests:       Echocardiograms:   ordered    Assessment:     1. Shortness of breath  Echo   2. Interstitial pulmonary disease, unspecified  Echo    CT Chest Without Contrast   3. Rheumatoid arthritis, involving unspecified site, unspecified whether rheumatoid factor present         Current Outpatient Medications   Medication Instructions    albuterol (PROVENTIL/VENTOLIN HFA) 90 mcg/actuation inhaler SMARTSI Puff(s) By Mouth Every 6 Hours PRN    amLODIPine (NORVASC) 5 mg, Oral, Daily    atorvastatin (LIPITOR) 20 mg, Oral, Daily    benzonatate (TESSALON) 100 mg, Oral, 3 times daily PRN    BREO ELLIPTA 100-25 mcg/dose diskus inhaler INHALE 1 PUFF INTO THE LUNGS EVERY DAY    diclofenac (VOLTAREN) 75 mg, Oral, 2 times daily     esomeprazole (NEXIUM) 40 mg, Oral, Daily    fluticasone propionate (FLONASE) 50 mcg, Each Nostril, 2 times daily PRN    folic acid (FOLVITE) 800 mcg, Oral, Daily    furosemide (LASIX) 20 mg, Oral, Daily    gabapentin (NEURONTIN) 300 mg, Oral, 3 times daily    lisinopril-hydrochlorothiazide (PRINZIDE,ZESTORETIC) 20-12.5 mg per tablet 1 tablet, Oral, Daily    methocarbamoL (ROBAXIN) 500 mg, Oral, 3 times daily PRN    methotrexate 2.5 MG Tab Every 7 days    nystatin (MYCOSTATIN) cream Topical (Top), As needed (PRN)    predniSONE (DELTASONE) 5 mg, Oral    vitamin D3-folic acid 5,000 unit- 1 mg Tab 1 tablet, Oral, Daily      Jocelyne MANUEL Duncan had no medications administered during this visit.     Orders Placed This Encounter   Procedures    CT Chest Without Contrast     RA on methotrexate- restriction on PFT     Standing Status:   Future     Standing Expiration Date:   8/24/2023     Scheduling Instructions:      ILD protocol     Order Specific Question:   May the Radiologist modify the order per protocol to meet the clinical needs of the patient?     Answer:   Yes    Echo     Standing Status:   Future     Standing Expiration Date:   8/24/2023     Order Specific Question:   Release to patient     Answer:   Immediate      Plan:       Problem List Items Addressed This Visit        Pulmonary    Shortness of breath - Primary    Current Assessment & Plan     Previously told that she has asthma and is being treated with breo which is helping  - continue using breo and albuterol since it is helping  - restriction on PFTs and given RA and mtx use- concern for ILD that is causing her symptoms  - CT chest ordered  - echo ordered to rule out PH             Relevant Orders    Echo       Immunology/Multi System    Rheumatoid arthritis    Current Assessment & Plan     Sees Rheumatology and currently on mtx but she doesn't feel that it is helping.   - has a follow up in a few weeks, will plan to reach out to her  rheumatologist if it appears this could be a lung manifestation of her autoimmune disease             Other Visit Diagnoses     Interstitial pulmonary disease, unspecified        Relevant Orders    Echo    CT Chest Without Contrast         Emily Coffman M.D.  Pulmonary/Critical Care

## 2022-08-24 NOTE — ASSESSMENT & PLAN NOTE
Previously told that she has asthma and is being treated with breo which is helping  - continue using breo and albuterol since it is helping  - restriction on PFTs and given RA and mtx use- concern for ILD that is causing her symptoms  - CT chest ordered  - echo ordered to rule out PH

## 2022-08-24 NOTE — ASSESSMENT & PLAN NOTE
Sees Rheumatology and currently on mtx but she doesn't feel that it is helping.   - has a follow up in a few weeks, will plan to reach out to her rheumatologist if it appears this could be a lung manifestation of her autoimmune disease

## 2022-09-02 ENCOUNTER — HOSPITAL ENCOUNTER (OUTPATIENT)
Dept: RADIOLOGY | Facility: HOSPITAL | Age: 77
Discharge: HOME | End: 2022-09-02
Attending: INTERNAL MEDICINE
Payer: MEDICARE

## 2022-09-02 ENCOUNTER — HOSPITAL ENCOUNTER (OUTPATIENT)
Dept: CARDIOLOGY | Facility: HOSPITAL | Age: 77
Discharge: HOME | End: 2022-09-02
Attending: INTERNAL MEDICINE
Payer: MEDICARE

## 2022-09-02 VITALS
SYSTOLIC BLOOD PRESSURE: 140 MMHG | HEART RATE: 75 BPM | HEIGHT: 60 IN | DIASTOLIC BLOOD PRESSURE: 70 MMHG | WEIGHT: 170 LBS | BODY MASS INDEX: 33.38 KG/M2

## 2022-09-02 DIAGNOSIS — J84.9 INTERSTITIAL PULMONARY DISEASE, UNSPECIFIED: ICD-10-CM

## 2022-09-02 DIAGNOSIS — R06.02 SHORTNESS OF BREATH: ICD-10-CM

## 2022-09-02 LAB
ASCENDING AORTA: 3.14 CM
AV INDEX (PROSTH): 0.82
AV MEAN GRADIENT: 5 MMHG
AV PEAK GRADIENT: 13 MMHG
AV VALVE AREA: 2.85 CM2
AV VELOCITY RATIO: 0.74
BSA FOR ECHO PROCEDURE: 1.81 M2
CV ECHO LV RWT: 0.39 CM
DOP CALC AO PEAK VEL: 1.78 M/S
DOP CALC AO VTI: 33.1 CM
DOP CALC LVOT AREA: 3.5 CM2
DOP CALC LVOT DIAMETER: 2.1 CM
DOP CALC LVOT PEAK VEL: 1.32 M/S
DOP CALC LVOT STROKE VOLUME: 94.4 CM3
DOP CALCLVOT PEAK VEL VTI: 27.27 CM
E/E' RATIO: 16.55 M/S
ECHO LV POSTERIOR WALL: 0.91 CM (ref 0.6–1.1)
EJECTION FRACTION: 60 %
FRACTIONAL SHORTENING: 35 % (ref 28–44)
INTERVENTRICULAR SEPTUM: 0.79 CM (ref 0.6–1.1)
IVRT: 97.05 MSEC
LA MAJOR: 5.3 CM
LA MINOR: 5.11 CM
LA WIDTH: 4.22 CM
LEFT ATRIUM SIZE: 4.01 CM
LEFT ATRIUM VOLUME INDEX MOD: 43.5 ML/M2
LEFT ATRIUM VOLUME INDEX: 43 ML/M2
LEFT ATRIUM VOLUME MOD: 75.76 CM3
LEFT ATRIUM VOLUME: 74.84 CM3
LEFT INTERNAL DIMENSION IN SYSTOLE: 3.05 CM (ref 2.1–4)
LEFT VENTRICLE DIASTOLIC VOLUME INDEX: 58.37 ML/M2
LEFT VENTRICLE DIASTOLIC VOLUME: 101.56 ML
LEFT VENTRICLE MASS INDEX: 76 G/M2
LEFT VENTRICLE SYSTOLIC VOLUME INDEX: 21 ML/M2
LEFT VENTRICLE SYSTOLIC VOLUME: 36.54 ML
LEFT VENTRICULAR INTERNAL DIMENSION IN DIASTOLE: 4.68 CM (ref 3.5–6)
LEFT VENTRICULAR MASS: 131.38 G
LV LATERAL E/E' RATIO: 15.17 M/S
LV SEPTAL E/E' RATIO: 18.2 M/S
MV PEAK E VEL: 0.91 M/S
PISA TR MAX VEL: 2.68 M/S
RA MAJOR: 4.43 CM
RA PRESSURE: 3 MMHG
RA WIDTH: 3.72 CM
RIGHT VENTRICULAR END-DIASTOLIC DIMENSION: 3 CM
RV TISSUE DOPPLER FREE WALL SYSTOLIC VELOCITY 1 (APICAL 4 CHAMBER VIEW): 13.85 CM/S
SINUS: 3.25 CM
STJ: 2.85 CM
TDI LATERAL: 0.06 M/S
TDI SEPTAL: 0.05 M/S
TDI: 0.06 M/S
TR MAX PG: 29 MMHG
TRICUSPID ANNULAR PLANE SYSTOLIC EXCURSION: 2.02 CM
TV REST PULMONARY ARTERY PRESSURE: 32 MMHG

## 2022-09-02 PROCEDURE — 93306 TTE W/DOPPLER COMPLETE: CPT

## 2022-09-02 PROCEDURE — 93306 TTE W/DOPPLER COMPLETE: CPT | Mod: 26,,, | Performed by: INTERNAL MEDICINE

## 2022-09-02 PROCEDURE — 71250 CT THORAX DX C-: CPT | Mod: TC

## 2022-09-02 PROCEDURE — 71250 CT CHEST WITHOUT CONTRAST: ICD-10-PCS | Mod: 26,,, | Performed by: RADIOLOGY

## 2022-09-02 PROCEDURE — 93306 ECHO (CUPID ONLY): ICD-10-PCS | Mod: 26,,, | Performed by: INTERNAL MEDICINE

## 2022-09-02 PROCEDURE — 71250 CT THORAX DX C-: CPT | Mod: 26,,, | Performed by: RADIOLOGY

## 2022-10-19 ENCOUNTER — OFFICE VISIT (OUTPATIENT)
Dept: PULMONOLOGY | Facility: CLINIC | Age: 77
End: 2022-10-19
Payer: MEDICARE

## 2022-10-19 VITALS
WEIGHT: 166.69 LBS | SYSTOLIC BLOOD PRESSURE: 138 MMHG | BODY MASS INDEX: 32.73 KG/M2 | DIASTOLIC BLOOD PRESSURE: 70 MMHG | HEART RATE: 66 BPM | OXYGEN SATURATION: 96 % | HEIGHT: 60 IN

## 2022-10-19 DIAGNOSIS — I27.20 PULMONARY HYPERTENSION: ICD-10-CM

## 2022-10-19 DIAGNOSIS — K21.9 GASTROESOPHAGEAL REFLUX DISEASE WITHOUT ESOPHAGITIS: ICD-10-CM

## 2022-10-19 DIAGNOSIS — R06.02 SHORTNESS OF BREATH: ICD-10-CM

## 2022-10-19 PROCEDURE — 3078F PR MOST RECENT DIASTOLIC BLOOD PRESSURE < 80 MM HG: ICD-10-PCS | Mod: CPTII,S$GLB,, | Performed by: INTERNAL MEDICINE

## 2022-10-19 PROCEDURE — 99999 PR PBB SHADOW E&M-EST. PATIENT-LVL IV: ICD-10-PCS | Mod: PBBFAC,,, | Performed by: INTERNAL MEDICINE

## 2022-10-19 PROCEDURE — 1159F MED LIST DOCD IN RCRD: CPT | Mod: CPTII,S$GLB,, | Performed by: INTERNAL MEDICINE

## 2022-10-19 PROCEDURE — 99213 PR OFFICE/OUTPT VISIT, EST, LEVL III, 20-29 MIN: ICD-10-PCS | Mod: S$GLB,,, | Performed by: INTERNAL MEDICINE

## 2022-10-19 PROCEDURE — 99999 PR PBB SHADOW E&M-EST. PATIENT-LVL IV: CPT | Mod: PBBFAC,,, | Performed by: INTERNAL MEDICINE

## 2022-10-19 PROCEDURE — 1126F PR PAIN SEVERITY QUANTIFIED, NO PAIN PRESENT: ICD-10-PCS | Mod: CPTII,S$GLB,, | Performed by: INTERNAL MEDICINE

## 2022-10-19 PROCEDURE — 3075F PR MOST RECENT SYSTOLIC BLOOD PRESS GE 130-139MM HG: ICD-10-PCS | Mod: CPTII,S$GLB,, | Performed by: INTERNAL MEDICINE

## 2022-10-19 PROCEDURE — 3288F FALL RISK ASSESSMENT DOCD: CPT | Mod: CPTII,S$GLB,, | Performed by: INTERNAL MEDICINE

## 2022-10-19 PROCEDURE — 3075F SYST BP GE 130 - 139MM HG: CPT | Mod: CPTII,S$GLB,, | Performed by: INTERNAL MEDICINE

## 2022-10-19 PROCEDURE — 99213 OFFICE O/P EST LOW 20 MIN: CPT | Mod: S$GLB,,, | Performed by: INTERNAL MEDICINE

## 2022-10-19 PROCEDURE — 1159F PR MEDICATION LIST DOCUMENTED IN MEDICAL RECORD: ICD-10-PCS | Mod: CPTII,S$GLB,, | Performed by: INTERNAL MEDICINE

## 2022-10-19 PROCEDURE — 3078F DIAST BP <80 MM HG: CPT | Mod: CPTII,S$GLB,, | Performed by: INTERNAL MEDICINE

## 2022-10-19 PROCEDURE — 1126F AMNT PAIN NOTED NONE PRSNT: CPT | Mod: CPTII,S$GLB,, | Performed by: INTERNAL MEDICINE

## 2022-10-19 PROCEDURE — 3288F PR FALLS RISK ASSESSMENT DOCUMENTED: ICD-10-PCS | Mod: CPTII,S$GLB,, | Performed by: INTERNAL MEDICINE

## 2022-10-19 PROCEDURE — 1101F PT FALLS ASSESS-DOCD LE1/YR: CPT | Mod: CPTII,S$GLB,, | Performed by: INTERNAL MEDICINE

## 2022-10-19 PROCEDURE — 1101F PR PT FALLS ASSESS DOC 0-1 FALLS W/OUT INJ PAST YR: ICD-10-PCS | Mod: CPTII,S$GLB,, | Performed by: INTERNAL MEDICINE

## 2022-10-19 RX ORDER — ASPIRIN 81 MG/1
81 TABLET ORAL DAILY
COMMUNITY
Start: 2022-08-23 | End: 2023-04-20

## 2022-10-19 RX ORDER — FLUTICASONE FUROATE AND VILANTEROL TRIFENATATE 200; 25 UG/1; UG/1
1 POWDER RESPIRATORY (INHALATION) DAILY
COMMUNITY
Start: 2022-10-02

## 2022-10-19 RX ORDER — TRIAMCINOLONE ACETONIDE 1 MG/G
CREAM TOPICAL 2 TIMES DAILY
COMMUNITY
Start: 2022-08-23 | End: 2023-10-22

## 2022-10-19 RX ORDER — LEFLUNOMIDE 20 MG/1
20 TABLET ORAL DAILY
COMMUNITY
Start: 2022-09-22

## 2022-10-19 RX ORDER — TRIAMCINOLONE ACETONIDE 1 MG/G
PASTE DENTAL
COMMUNITY
Start: 2022-09-09 | End: 2023-10-22

## 2022-10-19 RX ORDER — CLOBETASOL PROPIONATE 0.05 MG/G
GEL TOPICAL DAILY PRN
COMMUNITY
Start: 2022-09-20 | End: 2023-10-22

## 2022-10-19 NOTE — ASSESSMENT & PLAN NOTE
Previously told that she has asthma and is being treated with breo which is helping  - continue using breo and albuterol since it is helping  - restriction on PFTs and given RA and mtx use-   - CT chest with possible aspiration changes and one small nodule, will continue to monitor with yearly CT  - echo showed stable PASP- elevated but follows with cardiology

## 2022-10-19 NOTE — PROGRESS NOTES
Subjective:     Reason for visit: shortness of breath     Patient ID:  Jocelyne Duncan is a 77 y.o. female    Initial History:  76 yo with RA and previously told she had asthma that presents for evaluation of her shortness of breath. Her symptoms have been present for some time and she was recently started on breo which seems to help a lot. She has issues with exertion that cause shortness of breath but has also started noticing some shortness of breath at rest. IT has improved since starting the breo but is still present. She also notes a strange tightness in her chest that is intermittent and typically gets better without any interventions.     Interval History 10/19/22:   Ms. Casanova is doing ok and feels that the breo is definitely helping her asthma symptoms. She does still have occasional episodes of difficulty catching her breath when laying on her left side at times. She does overall feel better than she had before. She does have a new cough for the last few days that started with the weather change.     Review of Systems   Constitutional:  Negative for fever, malaise/fatigue and weight loss.   HENT:  Positive for congestion. Negative for ear discharge, ear pain and nosebleeds.    Eyes:  Negative for blurred vision, pain and discharge.   Respiratory:  Positive for cough. Negative for shortness of breath.    Cardiovascular:  Negative for chest pain, palpitations and orthopnea.   Gastrointestinal:  Positive for heartburn. Negative for abdominal pain, constipation and nausea.   Genitourinary: Negative.    Musculoskeletal:  Positive for back pain and joint pain. Negative for falls, myalgias and neck pain.   Skin:  Negative for itching and rash.   Neurological:  Negative for dizziness, tremors, sensory change and weakness.   Endo/Heme/Allergies:  Negative for environmental allergies. Does not bruise/bleed easily.   Psychiatric/Behavioral: Negative.        Objective:     Vitals:    10/19/22 1524   BP: 138/70   BP  Location: Right arm   Patient Position: Sitting   Pulse: 66   SpO2: 96%   Weight: 75.6 kg (166 lb 10.7 oz)   Height: 5' (1.524 m)         Physical Exam  Vitals and nursing note reviewed.   Constitutional:       General: She is not in acute distress.     Appearance: She is not diaphoretic.   HENT:      Head: Normocephalic and atraumatic.      Right Ear: External ear normal.      Left Ear: External ear normal.   Eyes:      Conjunctiva/sclera: Conjunctivae normal.      Pupils: Pupils are equal, round, and reactive to light.   Neck:      Trachea: No tracheal deviation.   Cardiovascular:      Rate and Rhythm: Normal rate and regular rhythm.      Heart sounds: Normal heart sounds. No murmur heard.  Pulmonary:      Effort: Pulmonary effort is normal. No respiratory distress.      Breath sounds: No stridor. No wheezing or rales.   Abdominal:      General: Bowel sounds are normal. There is no distension.      Palpations: Abdomen is soft.      Tenderness: There is no abdominal tenderness.   Musculoskeletal:         General: Deformity (arthritis of both hands ) present. Normal range of motion.      Cervical back: Normal range of motion and neck supple.   Skin:     General: Skin is warm and dry.      Findings: No erythema.   Neurological:      Mental Status: She is alert and oriented to person, place, and time.      Gait: Gait is intact.   Psychiatric:         Mood and Affect: Mood and affect normal.         Cognition and Memory: Memory normal.         Judgment: Judgment normal.        Discussed CT and echo findings with patient and her .       Results for orders placed during the hospital encounter of 09/02/22    Echo    Interpretation Summary  · The left ventricle is normal in size with normal systolic function.  · The estimated ejection fraction is 60%.  · Moderate left atrial enlargement.  · Left ventricular diastolic dysfunction.  · Normal right ventricular size with normal right ventricular systolic function.  ·  Mild tricuspid regurgitation.  · Normal central venous pressure (3 mmHg).  · The estimated PA systolic pressure is 32 mmHg.  · Trivial pericardial effusion. Under the atria.      Assessment:     1. Shortness of breath        2. Gastroesophageal reflux disease without esophagitis        3. Pulmonary hypertension            Current Outpatient Medications   Medication Instructions    albuterol (PROVENTIL/VENTOLIN HFA) 90 mcg/actuation inhaler SMARTSI Puff(s) By Mouth Every 6 Hours PRN    amLODIPine (NORVASC) 5 mg, Oral, Daily    aspirin (ECOTRIN) 81 mg, Oral, Daily    atorvastatin (LIPITOR) 20 mg, Oral, Daily    benzonatate (TESSALON) 100 mg, Oral, 3 times daily PRN    BREO ELLIPTA 200-25 mcg/dose DsDv diskus inhaler 1 puff, Daily    clobetasoL (TEMOVATE) 0.05 % Gel Daily PRN    diclofenac (VOLTAREN) 75 mg, Oral, 2 times daily    esomeprazole (NEXIUM) 40 mg, Oral, Daily    fluticasone propionate (FLONASE) 50 mcg, Each Nostril, 2 times daily PRN    folic acid (FOLVITE) 800 mcg, Oral, Daily    furosemide (LASIX) 20 mg, Oral, Daily    gabapentin (NEURONTIN) 300 mg, Oral, 3 times daily    leflunomide (ARAVA) 20 mg, Oral, Daily    lisinopril-hydrochlorothiazide (PRINZIDE,ZESTORETIC) 20-12.5 mg per tablet 1 tablet, Oral, Daily    methocarbamoL (ROBAXIN) 500 mg, Oral, 3 times daily PRN    methotrexate 2.5 MG Tab Every 7 days    nystatin (MYCOSTATIN) cream Topical (Top), As needed (PRN)    predniSONE (DELTASONE) 5 mg, Oral    SITagliptin (JANUVIA) 50 MG Tab Oral, Daily    triamcinolone acetonide 0.1% (KENALOG) 0.1 % cream 2 times daily, apply to affected area    triamcinolone acetonide 0.1% (KENALOG) 0.1 % paste SMARTSIG:TO TEETH    vitamin D3-folic acid 5,000 unit- 1 mg Tab 1 tablet, Oral, Daily      Jocelyne MANUEL Duncan had no medications administered during this visit.     No orders of the defined types were placed in this encounter.     Plan:       Problem List Items Addressed This Visit          Pulmonary    Shortness of  breath    Current Assessment & Plan     Previously told that she has asthma and is being treated with breo which is helping  - continue using breo and albuterol since it is helping  - restriction on PFTs and given RA and mtx use-   - CT chest with possible aspiration changes and one small nodule, will continue to monitor with yearly CT  - echo showed stable PASP- elevated but follows with cardiology            Pulmonary hypertension    Current Assessment & Plan     Stable on last few echos- follows with cardiology - not group 3 could be group 2 from HFpEF            GI    GERD (gastroesophageal reflux disease)    Current Assessment & Plan     Continue medication and precautions            Emily Coffman M.D.  Pulmonary/Critical Care

## 2022-10-27 ENCOUNTER — OFFICE VISIT (OUTPATIENT)
Dept: CARDIOLOGY | Facility: CLINIC | Age: 77
End: 2022-10-27
Payer: MEDICARE

## 2022-10-27 VITALS
DIASTOLIC BLOOD PRESSURE: 78 MMHG | HEIGHT: 60 IN | SYSTOLIC BLOOD PRESSURE: 137 MMHG | WEIGHT: 166.69 LBS | RESPIRATION RATE: 20 BRPM | HEART RATE: 78 BPM | BODY MASS INDEX: 32.73 KG/M2

## 2022-10-27 DIAGNOSIS — E78.49 OTHER HYPERLIPIDEMIA: ICD-10-CM

## 2022-10-27 DIAGNOSIS — I49.1 PREMATURE ATRIAL COMPLEXES: ICD-10-CM

## 2022-10-27 DIAGNOSIS — I10 PRIMARY HYPERTENSION: Primary | ICD-10-CM

## 2022-10-27 PROCEDURE — 99999 PR PBB SHADOW E&M-EST. PATIENT-LVL IV: CPT | Mod: PBBFAC,,, | Performed by: INTERNAL MEDICINE

## 2022-10-27 PROCEDURE — 99204 PR OFFICE/OUTPT VISIT, NEW, LEVL IV, 45-59 MIN: ICD-10-PCS | Mod: S$GLB,,, | Performed by: INTERNAL MEDICINE

## 2022-10-27 PROCEDURE — 1159F MED LIST DOCD IN RCRD: CPT | Mod: CPTII,S$GLB,, | Performed by: INTERNAL MEDICINE

## 2022-10-27 PROCEDURE — 1160F PR REVIEW ALL MEDS BY PRESCRIBER/CLIN PHARMACIST DOCUMENTED: ICD-10-PCS | Mod: CPTII,S$GLB,, | Performed by: INTERNAL MEDICINE

## 2022-10-27 PROCEDURE — 3078F DIAST BP <80 MM HG: CPT | Mod: CPTII,S$GLB,, | Performed by: INTERNAL MEDICINE

## 2022-10-27 PROCEDURE — 3078F PR MOST RECENT DIASTOLIC BLOOD PRESSURE < 80 MM HG: ICD-10-PCS | Mod: CPTII,S$GLB,, | Performed by: INTERNAL MEDICINE

## 2022-10-27 PROCEDURE — 99204 OFFICE O/P NEW MOD 45 MIN: CPT | Mod: S$GLB,,, | Performed by: INTERNAL MEDICINE

## 2022-10-27 PROCEDURE — 1159F PR MEDICATION LIST DOCUMENTED IN MEDICAL RECORD: ICD-10-PCS | Mod: CPTII,S$GLB,, | Performed by: INTERNAL MEDICINE

## 2022-10-27 PROCEDURE — 99999 PR PBB SHADOW E&M-EST. PATIENT-LVL IV: ICD-10-PCS | Mod: PBBFAC,,, | Performed by: INTERNAL MEDICINE

## 2022-10-27 PROCEDURE — 1126F AMNT PAIN NOTED NONE PRSNT: CPT | Mod: CPTII,S$GLB,, | Performed by: INTERNAL MEDICINE

## 2022-10-27 PROCEDURE — 3288F FALL RISK ASSESSMENT DOCD: CPT | Mod: CPTII,S$GLB,, | Performed by: INTERNAL MEDICINE

## 2022-10-27 PROCEDURE — 1101F PT FALLS ASSESS-DOCD LE1/YR: CPT | Mod: CPTII,S$GLB,, | Performed by: INTERNAL MEDICINE

## 2022-10-27 PROCEDURE — 1126F PR PAIN SEVERITY QUANTIFIED, NO PAIN PRESENT: ICD-10-PCS | Mod: CPTII,S$GLB,, | Performed by: INTERNAL MEDICINE

## 2022-10-27 PROCEDURE — 1101F PR PT FALLS ASSESS DOC 0-1 FALLS W/OUT INJ PAST YR: ICD-10-PCS | Mod: CPTII,S$GLB,, | Performed by: INTERNAL MEDICINE

## 2022-10-27 PROCEDURE — 1160F RVW MEDS BY RX/DR IN RCRD: CPT | Mod: CPTII,S$GLB,, | Performed by: INTERNAL MEDICINE

## 2022-10-27 PROCEDURE — 3075F SYST BP GE 130 - 139MM HG: CPT | Mod: CPTII,S$GLB,, | Performed by: INTERNAL MEDICINE

## 2022-10-27 PROCEDURE — 3288F PR FALLS RISK ASSESSMENT DOCUMENTED: ICD-10-PCS | Mod: CPTII,S$GLB,, | Performed by: INTERNAL MEDICINE

## 2022-10-27 PROCEDURE — 3075F PR MOST RECENT SYSTOLIC BLOOD PRESS GE 130-139MM HG: ICD-10-PCS | Mod: CPTII,S$GLB,, | Performed by: INTERNAL MEDICINE

## 2022-10-27 NOTE — PROGRESS NOTES
HISTORY:    77-year-old female with a history of PACs, hypertension, hyperlipidemia, rheumatoid arthritis, and asthma presenting for initial evaluation by me.  The patient usually follows with Dr. Martell.    The patient denies any chest pain. No SOB at rest. MILLER that is chronic and improved on inhalers. Tolerates below meds. Does notc heck Bps at home.     No dedicated exercise. Limited by cLBP. Uses a cane for balance. Does basic ADLs.     The patient denies any previous history of myocardial infarction, coronary artery disease, peripheral arterial disease, stroke, congestive heart failure, or cardiomyopathy.      MEDICATIONS:      Current Outpatient Medications:     albuterol (PROVENTIL/VENTOLIN HFA) 90 mcg/actuation inhaler, SMARTSI Puff(s) By Mouth Every 6 Hours PRN, Disp: , Rfl:     amLODIPine (NORVASC) 5 MG tablet, Take 5 mg by mouth once daily. , Disp: , Rfl:     aspirin (ECOTRIN) 81 MG EC tablet, Take 81 mg by mouth once daily., Disp: , Rfl:     atorvastatin (LIPITOR) 20 MG tablet, Take 20 mg by mouth once daily. , Disp: , Rfl:     benzonatate (TESSALON) 100 MG capsule, Take 100 mg by mouth 3 (three) times daily as needed., Disp: , Rfl:     BREO ELLIPTA 200-25 mcg/dose DsDv diskus inhaler, 1 puff once daily., Disp: , Rfl:     clobetasoL (TEMOVATE) 0.05 % Gel, daily as needed., Disp: , Rfl:     diclofenac (VOLTAREN) 75 MG EC tablet, Take 75 mg by mouth 2 (two) times daily., Disp: , Rfl:     esomeprazole (NEXIUM) 40 MG capsule, Take 40 mg by mouth once daily., Disp: , Rfl:     fluticasone propionate (FLONASE) 50 mcg/actuation nasal spray, 1 spray (50 mcg total) by Each Nostril route 2 (two) times daily as needed for Rhinitis., Disp: 15 g, Rfl: 0    furosemide (LASIX) 20 MG tablet, Take 20 mg by mouth once daily. , Disp: , Rfl:     gabapentin (NEURONTIN) 300 MG capsule, Take 300 mg by mouth 3 (three) times daily., Disp: , Rfl:     leflunomide (ARAVA) 20 MG Tab, Take 20 mg by mouth once daily., Disp: ,  Rfl:     lisinopril-hydrochlorothiazide (PRINZIDE,ZESTORETIC) 20-12.5 mg per tablet, Take 1 tablet by mouth once daily. , Disp: , Rfl:     methocarbamoL (ROBAXIN) 500 MG Tab, Take 500 mg by mouth 3 (three) times daily as needed., Disp: , Rfl:     methotrexate 2.5 MG Tab, every 7 days. , Disp: , Rfl:     nystatin (MYCOSTATIN) cream, Apply topically as needed., Disp: , Rfl:     predniSONE (DELTASONE) 2.5 MG tablet, Take 5 mg by mouth., Disp: , Rfl:     SITagliptin (JANUVIA) 50 MG Tab, Take by mouth once daily., Disp: , Rfl:     triamcinolone acetonide 0.1% (KENALOG) 0.1 % cream, 2 (two) times daily. apply to affected area, Disp: , Rfl:     triamcinolone acetonide 0.1% (KENALOG) 0.1 % paste, SMARTSIG:TO TEETH, Disp: , Rfl:     vitamin D3-folic acid 5,000 unit- 1 mg Tab, Take 1 tablet by mouth once daily., Disp: , Rfl:     folic acid (FOLVITE) 800 MCG Tab, Take 800 mcg by mouth once daily., Disp: , Rfl:       PHYSICAL EXAM:    Vitals:    10/27/22 1528   BP: 137/78   Pulse: 78   Resp: 20       NAD, A+Ox3.  No jvd, no bruit.  RRR nml s1,s2. No murmurs.  CTA B no wheezes or crackles.  2+ B radial and 1+ B DP/PT. No edema.    LABS/STUDIES (imaging reviewed during clinic visit):    June 2022 hemoglobin 11.8 with MCV 93.  Creatinine 1.0 with BUN of 25.  Albumin 3.8.  Troponin of BMP normal.    ECG June 2022 demonstrates an ectopic atrial rhythm with no Q-waves or ST changes.  Poor R-wave progression secondary to body habitus.    TTE September 2022 demonstrates normal LV size and function with EF 60%.  CVP 3.      ASSESSMENT & PLAN:    1. Primary hypertension    2. Premature atrial complexes    3. Other hyperlipidemia            Tolerates amlodipine 5x1, lisinopril 20x1, and hctz 12.5x1. Bps okay today. Encourage pt to start a home log as they have trended up previously. Tolerating atorvastatin therapy. No lipid panel available to me. Chronic MILLER with a nml TTE. No c/o palpitations. Ectopic atrial rhythm and h/o PACs noted.  Monitor for now. PASPs acceptable on last TTE.    Follow-up with Dr. Martell.       Ruthie Heath MD

## 2023-04-20 ENCOUNTER — OFFICE VISIT (OUTPATIENT)
Dept: CARDIOLOGY | Facility: CLINIC | Age: 78
End: 2023-04-20
Payer: MEDICARE

## 2023-04-20 VITALS
BODY MASS INDEX: 31.68 KG/M2 | HEIGHT: 60 IN | HEART RATE: 77 BPM | OXYGEN SATURATION: 96 % | WEIGHT: 161.38 LBS | DIASTOLIC BLOOD PRESSURE: 65 MMHG | SYSTOLIC BLOOD PRESSURE: 134 MMHG

## 2023-04-20 DIAGNOSIS — I10 PRIMARY HYPERTENSION: ICD-10-CM

## 2023-04-20 DIAGNOSIS — R06.02 SHORTNESS OF BREATH: ICD-10-CM

## 2023-04-20 DIAGNOSIS — M06.9 RHEUMATOID ARTHRITIS, INVOLVING UNSPECIFIED SITE, UNSPECIFIED WHETHER RHEUMATOID FACTOR PRESENT: ICD-10-CM

## 2023-04-20 DIAGNOSIS — E78.49 OTHER HYPERLIPIDEMIA: Primary | ICD-10-CM

## 2023-04-20 DIAGNOSIS — R23.3 ECCHYMOSES, SPONTANEOUS: ICD-10-CM

## 2023-04-20 PROCEDURE — 1101F PR PT FALLS ASSESS DOC 0-1 FALLS W/OUT INJ PAST YR: ICD-10-PCS | Mod: CPTII,S$GLB,, | Performed by: INTERNAL MEDICINE

## 2023-04-20 PROCEDURE — 99999 PR PBB SHADOW E&M-EST. PATIENT-LVL V: CPT | Mod: PBBFAC,,, | Performed by: INTERNAL MEDICINE

## 2023-04-20 PROCEDURE — 3075F PR MOST RECENT SYSTOLIC BLOOD PRESS GE 130-139MM HG: ICD-10-PCS | Mod: CPTII,S$GLB,, | Performed by: INTERNAL MEDICINE

## 2023-04-20 PROCEDURE — 3288F PR FALLS RISK ASSESSMENT DOCUMENTED: ICD-10-PCS | Mod: CPTII,S$GLB,, | Performed by: INTERNAL MEDICINE

## 2023-04-20 PROCEDURE — 3078F DIAST BP <80 MM HG: CPT | Mod: CPTII,S$GLB,, | Performed by: INTERNAL MEDICINE

## 2023-04-20 PROCEDURE — 1101F PT FALLS ASSESS-DOCD LE1/YR: CPT | Mod: CPTII,S$GLB,, | Performed by: INTERNAL MEDICINE

## 2023-04-20 PROCEDURE — 1126F PR PAIN SEVERITY QUANTIFIED, NO PAIN PRESENT: ICD-10-PCS | Mod: CPTII,S$GLB,, | Performed by: INTERNAL MEDICINE

## 2023-04-20 PROCEDURE — 3288F FALL RISK ASSESSMENT DOCD: CPT | Mod: CPTII,S$GLB,, | Performed by: INTERNAL MEDICINE

## 2023-04-20 PROCEDURE — 3075F SYST BP GE 130 - 139MM HG: CPT | Mod: CPTII,S$GLB,, | Performed by: INTERNAL MEDICINE

## 2023-04-20 PROCEDURE — 99214 OFFICE O/P EST MOD 30 MIN: CPT | Mod: S$GLB,,, | Performed by: INTERNAL MEDICINE

## 2023-04-20 PROCEDURE — 3078F PR MOST RECENT DIASTOLIC BLOOD PRESSURE < 80 MM HG: ICD-10-PCS | Mod: CPTII,S$GLB,, | Performed by: INTERNAL MEDICINE

## 2023-04-20 PROCEDURE — 99999 PR PBB SHADOW E&M-EST. PATIENT-LVL V: ICD-10-PCS | Mod: PBBFAC,,, | Performed by: INTERNAL MEDICINE

## 2023-04-20 PROCEDURE — 1126F AMNT PAIN NOTED NONE PRSNT: CPT | Mod: CPTII,S$GLB,, | Performed by: INTERNAL MEDICINE

## 2023-04-20 PROCEDURE — 1159F PR MEDICATION LIST DOCUMENTED IN MEDICAL RECORD: ICD-10-PCS | Mod: CPTII,S$GLB,, | Performed by: INTERNAL MEDICINE

## 2023-04-20 PROCEDURE — 1159F MED LIST DOCD IN RCRD: CPT | Mod: CPTII,S$GLB,, | Performed by: INTERNAL MEDICINE

## 2023-04-20 PROCEDURE — 99214 PR OFFICE/OUTPT VISIT, EST, LEVL IV, 30-39 MIN: ICD-10-PCS | Mod: S$GLB,,, | Performed by: INTERNAL MEDICINE

## 2023-04-21 NOTE — PROGRESS NOTES
Subjective:   Chief Complaint: Establish Care, Follow-up, and Shortness of Breath  Last Clinic Visit: 2/28/2022      History of Present Illness: Jocelyne Duncan is a 78 y.o. lady [place]-speaking from [place], but speaking English fluently, with hypertension, hyperlipidemia, rheumatoid arthritis on DMARD/steroid, in PACs, who presents to follow-up with cardiology.    Interval History:  For her shortness of breath we obtained PFTs which showed only mild restriction and mildly decreased DLCO.  Echocardiogram without significantly elevated PA pressures, no significant systolic or diastolic dysfunction. Ultimately no clear etiology of shortness of breath. We prescribed some Breo with slight help.  Symptoms overall stable in the course of the past year.  Checking blood pressure at home 110/73.  Denies any lightheadedness.  Continues to have significant polyarthritis secondary to rheumatoid and following with rheumatology for this.  Comes into clinic today in wheelchair accompanied by . She also reports some spontaneous ecchymoses in her legs.     2/28/2022  We recommended PFTs when we saw her last given methotrexate use and rheumatoid arthritis diagnosis, but not yet obtained.  She was minimally symptomatic from her PACs at that time, not interested in therapy.  Does endorse some mild lower extremity edema.  We refilled her inhaled corticosteroid last time which she has been using, reports that her shortness of breath has worsened some and ask about increasing the medication, but she has not yet seen anyone in the pulmonary department, or addressed with PCP.  Does endorse some difficulty sleeping, no profound weight gain.  Outside rheumatologist.  Recent labs with normal renal function, grossly normal electrolytes, and no significant anemia.    5/24/2021  When we saw her last she was having significant shortness of breath, multifactorial etiology, we obtained an echocardiogram, she had preserved EF, some  aortic sclerosis,but no significant stenosis.  She had significant PACs, but was not interested in management at that time for these.  With her history of rheumatoid arthritis, and methotrexate use, recommended discussing PFTs with Rheumatology, not yet performed.    2020    She recently saw her PCP who noted irregular heartbeats, and referred to Cardiology.  She has a history of PACs longstanding, extensive workup at outside hospital, without any evidence of AFib a flutter, and no evidence of congestive heart failure or underlying structural heart disease.  She saw Cardiology here last year for similar issues.  She denies any change in her palpitations, irregular, at rest, no associated chest pain or shortness of breath with palpitations, typically happen once a week, and resolved spontaneously.  No sustained periods of tachycardia, no focal neurological deficits.  She has a new complaint of worsening shortness of breath over the course the past year PCP is also ordered a chest x-ray, occasional wheezing, no clear orthopnea, no clear weight gain, no PND.  She does however have some difficulty with sleeping.  She reports last echocardiogram was many years ago, unclear if was done at outside facility.   is from [place], he also would like to be referred to us for issues it sounds like with atrial fibrillation and DVTs.  She is not currently on Lasix.  Questionable diagnosis of COPD.  Palpitations are not severe enough to where she would want any medication or procedural therapy for them.    Dx:  Hypertension  Hyperlipidemia  Osteoarthritis  Rheumatoid arthritis on DMARD/steroid  PACs    Medications:  Outpatient Encounter Medications as of 2023   Medication Sig Dispense Refill    albuterol (PROVENTIL/VENTOLIN HFA) 90 mcg/actuation inhaler SMARTSI Puff(s) By Mouth Every 6 Hours PRN      amLODIPine (NORVASC) 5 MG tablet Take 5 mg by mouth once daily.       atorvastatin (LIPITOR) 20 MG tablet Take 20  mg by mouth once daily.       benzonatate (TESSALON) 100 MG capsule Take 100 mg by mouth 3 (three) times daily as needed.      BREO ELLIPTA 200-25 mcg/dose DsDv diskus inhaler 1 puff once daily.      clobetasoL (TEMOVATE) 0.05 % Gel daily as needed.      diclofenac (VOLTAREN) 75 MG EC tablet Take 75 mg by mouth 2 (two) times daily.      esomeprazole (NEXIUM) 40 MG capsule Take 40 mg by mouth once daily.      fluticasone propionate (FLONASE) 50 mcg/actuation nasal spray 1 spray (50 mcg total) by Each Nostril route 2 (two) times daily as needed for Rhinitis. 15 g 0    folic acid (FOLVITE) 800 MCG Tab Take 800 mcg by mouth once daily.      furosemide (LASIX) 20 MG tablet Take 20 mg by mouth once daily.       gabapentin (NEURONTIN) 300 MG capsule Take 300 mg by mouth 3 (three) times daily.      leflunomide (ARAVA) 20 MG Tab Take 20 mg by mouth once daily.      lisinopril-hydrochlorothiazide (PRINZIDE,ZESTORETIC) 20-12.5 mg per tablet Take 1 tablet by mouth once daily.       methocarbamoL (ROBAXIN) 500 MG Tab Take 500 mg by mouth 3 (three) times daily as needed.      methotrexate 2.5 MG Tab every 7 days.       nystatin (MYCOSTATIN) cream Apply topically as needed.      predniSONE (DELTASONE) 2.5 MG tablet Take 5 mg by mouth.      SITagliptin (JANUVIA) 50 MG Tab Take by mouth once daily.      triamcinolone acetonide 0.1% (KENALOG) 0.1 % cream 2 (two) times daily. apply to affected area      triamcinolone acetonide 0.1% (KENALOG) 0.1 % paste SMARTSIG:TO TEETH      vitamin D3-folic acid 5,000 unit- 1 mg Tab Take 1 tablet by mouth once daily.      [DISCONTINUED] aspirin (ECOTRIN) 81 MG EC tablet Take 81 mg by mouth once daily.       No facility-administered encounter medications on file as of 4/20/2023.     Social History:  Jocelyne reports that she has never smoked. She has never used smokeless tobacco. She reports current alcohol use. She reports that she does not use drugs.    Objective:   /65 (BP Location: Left arm,  Patient Position: Sitting, BP Method: Large (Automatic))   Pulse 77   Ht 5' (1.524 m)   Wt 73.2 kg (161 lb 6 oz)   LMP  (LMP Unknown)   SpO2 96%   BMI 31.52 kg/m²     Physical Exam  Constitutional:       General: She is not in acute distress.     Appearance: She is not diaphoretic.   HENT:      Head: Normocephalic and atraumatic.      Mouth/Throat:      Pharynx: No oropharyngeal exudate.   Eyes:      General: No scleral icterus.  Neck:      Thyroid: No thyromegaly.      Vascular: No JVD.      Trachea: No tracheal deviation.   Cardiovascular:      Rate and Rhythm: Normal rate.      Heart sounds: Murmur (One/6 systolic) heard.     No friction rub. No gallop.      Comments: Occasional ectopic beat, otherwise regular R-R interval.  Pulmonary:      Effort: Pulmonary effort is normal. No respiratory distress.      Breath sounds: Normal breath sounds. No wheezing or rales.   Chest:      Chest wall: No tenderness.   Abdominal:      General: There is no distension.      Palpations: Abdomen is soft.      Tenderness: There is no abdominal tenderness. There is no guarding or rebound.   Musculoskeletal:         General: Normal range of motion.   Skin:     General: Skin is warm and dry.      Findings: No erythema.   Neurological:      Mental Status: She is alert and oriented to person, place, and time.   Psychiatric:         Mood and Affect: Affect normal.     EKG:  My independent visualization of most recent EKG is normal sinus rhythm with PACs    TTE:  09/02/2022  The left ventricle is normal in size with normal systolic function.  The estimated ejection fraction is 60%.  Moderate left atrial enlargement.  Left ventricular diastolic dysfunction.  Normal right ventricular size with normal right ventricular systolic function.  Mild tricuspid regurgitation.  Normal central venous pressure (3 mmHg).  The estimated PA systolic pressure is 32 mmHg.  Trivial pericardial effusion. Under the atria.     12/11/2020  The left  ventricle is normal in size with normal systolic function. The estimated ejection fraction is 65%.  Grade I diastolic dysfunction.  Mild right ventricular enlargement with low normal right ventricular systolic function.  Aortic annular calcification. Aortic sclerosis without stenosis.  Mild to moderate tricuspid regurgitation.  The estimated PA systolic pressure is 39 mmHg.  Normal central venous pressure (3 mmHg).     Lipids:       Renal:  Recent Labs   Lab 06/14/22  2201   Creatinine 1.0   Potassium 4.0   CO2 22 L   BUN 25 H       Liver:  Recent Labs   Lab 06/14/22  2201   AST 17   ALT 15       PFTs   3/14/22  Spirometry is normal. Lung volumes show mild restriction is present. DLCO is mildly decreased.    Assessment:     1. Other hyperlipidemia    2. Primary hypertension    3. Ecchymoses, spontaneous    4. Shortness of breath    5. Rheumatoid arthritis, involving unspecified site, unspecified whether rheumatoid factor present      Plan:   1. Other hyperlipidemia  Due for follow up lipids, will obtain with [facility], continue statin.  - Lipid Panel; Future  - Lipid Panel    2. Primary hypertension  BP well controlled continue current meds.   - Comprehensive Metabolic Panel; Future  - Comprehensive Metabolic Panel    3. Ecchymoses, spontaneous  Will check coags and platelets.  Not currently taking ASA.  - CBC Auto Differential; Future  - Protime-INR; Future  - APTT; Future  - CBC Auto Differential  - Protime-INR  - APTT    4. Shortness of breath  Suspect secondary to significant arthritis, immobility, no obvious cardiac or pulmonary cause over the past year and relatively stable.    5. Rheumatoid arthritis, involving unspecified site, unspecified whether rheumatoid factor present      Follow up in 1 yr      Parish Martell MD [facility]

## 2023-08-09 ENCOUNTER — TELEPHONE (OUTPATIENT)
Dept: PULMONOLOGY | Facility: CLINIC | Age: 78
End: 2023-08-09
Payer: MEDICARE

## 2023-08-09 NOTE — TELEPHONE ENCOUNTER
----- Message from Carloz Nazario sent at 8/9/2023  1:11 PM CDT -----  Regarding: appt  Contact: [phone]  Pt is calling to reschedule appt 8/9 due to not feeling good. Pt would like an appt rs but no available dates. Please call for scheduling

## 2023-08-09 NOTE — TELEPHONE ENCOUNTER
Spoke with patient Ms[facility] in regards to follow up appointment with .  Patient will contact  the office the week of August 21,2023 to be scheduled for  next opening clinic week around September 6,2023.

## 2023-08-14 ENCOUNTER — TELEPHONE (OUTPATIENT)
Dept: PULMONOLOGY | Facility: CLINIC | Age: 78
End: 2023-08-14
Payer: MEDICARE

## 2023-08-14 NOTE — TELEPHONE ENCOUNTER
----- Message from Yao Ceron sent at 8/14/2023  2:12 PM CDT -----  Regarding: Appt  Contact: @[phone]  Patient is calling to reschedule a appt she missed last week ....... No available dates.... Please call and advise @[phone] (patient states she cannot receive voicemail)

## 2023-08-14 NOTE — TELEPHONE ENCOUNTER
MIGUEL ANGELM Advising patient  to give me a call back in regards to rescheduling follow up visit with .  My name and office number was provided to receive a call back.

## 2023-09-27 ENCOUNTER — TELEPHONE (OUTPATIENT)
Dept: PULMONOLOGY | Facility: CLINIC | Age: 78
End: 2023-09-27
Payer: MEDICARE

## 2023-09-27 NOTE — TELEPHONE ENCOUNTER
I spoke with Ms Trusty in regards to scheduling her follow up visit. Appointment made on 11/29/23 at 10:30 AM with Dr Coffman. Patient confirmed and verbalized understanding.

## 2023-09-27 NOTE — TELEPHONE ENCOUNTER
----- Message from Jennifer Redmond sent at 9/27/2023  2:45 PM CDT -----  Regarding: Appt Access  Contact: [phone]  SCHEDULING/REQUEST    Appt Type: EP    Date/Time Preference: 10/2023    Treating Provider:  Emily Coffman    Caller Name:  NAYE ROSALES [[id number]]    Contact Preference:  [phone] (requests not to leave voicemail, her phone doesn't accept vm)    Comments/Notes: Pt missed last appt in 08/2023 due to illness and was told she would be r/s and never received an appt.  Please call.

## 2023-10-21 ENCOUNTER — HOSPITAL ENCOUNTER (OUTPATIENT)
Facility: HOSPITAL | Age: 78
Discharge: HOME | End: 2023-10-22
Attending: STUDENT IN AN ORGANIZED HEALTH CARE EDUCATION/TRAINING PROGRAM | Admitting: HOSPITALIST
Payer: MEDICARE

## 2023-10-21 DIAGNOSIS — R07.9 CHEST PAIN: ICD-10-CM

## 2023-10-21 DIAGNOSIS — N17.9 AKI (ACUTE KIDNEY INJURY): ICD-10-CM

## 2023-10-21 PROBLEM — A09 INFECTIOUS DIARRHEA: Status: ACTIVE | Noted: 2023-10-21

## 2023-10-21 PROBLEM — E78.49 OTHER HYPERLIPIDEMIA: Chronic | Status: ACTIVE | Noted: 2022-10-27

## 2023-10-21 PROBLEM — E87.20 METABOLIC ACIDOSIS: Status: ACTIVE | Noted: 2023-10-21

## 2023-10-21 PROBLEM — D84.9 IMMUNOSUPPRESSED STATUS: Chronic | Status: ACTIVE | Noted: 2023-10-21

## 2023-10-21 LAB
ALBUMIN SERPL BCP-MCNC: 3.3 G/DL (ref 3.5–5.2)
ALP SERPL-CCNC: 49 U/L (ref 55–135)
ALT SERPL W/O P-5'-P-CCNC: 12 U/L (ref 10–44)
ANION GAP SERPL CALC-SCNC: 10 MMOL/L (ref 8–16)
AST SERPL-CCNC: 19 U/L (ref 10–40)
BACTERIA #/AREA URNS AUTO: ABNORMAL /HPF
BASOPHILS # BLD AUTO: 0.05 K/UL (ref 0–0.2)
BASOPHILS NFR BLD: 0.5 % (ref 0–1.9)
BILIRUB SERPL-MCNC: 0.4 MG/DL (ref 0.1–1)
BILIRUB UR QL STRIP: NEGATIVE
BUN SERPL-MCNC: 24 MG/DL (ref 8–23)
CALCIUM SERPL-MCNC: 8.9 MG/DL (ref 8.7–10.5)
CHLORIDE SERPL-SCNC: 111 MMOL/L (ref 95–110)
CLARITY UR REFRACT.AUTO: ABNORMAL
CO2 SERPL-SCNC: 19 MMOL/L (ref 23–29)
COLOR UR AUTO: YELLOW
CREAT SERPL-MCNC: 1.5 MG/DL (ref 0.5–1.4)
DIFFERENTIAL METHOD: ABNORMAL
EOSINOPHIL # BLD AUTO: 0.1 K/UL (ref 0–0.5)
EOSINOPHIL NFR BLD: 0.7 % (ref 0–8)
ERYTHROCYTE [DISTWIDTH] IN BLOOD BY AUTOMATED COUNT: 15.2 % (ref 11.5–14.5)
EST. GFR  (NO RACE VARIABLE): 35.7 ML/MIN/1.73 M^2
GLUCOSE SERPL-MCNC: 165 MG/DL (ref 70–110)
GLUCOSE UR QL STRIP: NEGATIVE
HCT VFR BLD AUTO: 35.8 % (ref 37–48.5)
HCV AB SERPL QL IA: NORMAL
HGB BLD-MCNC: 11.6 G/DL (ref 12–16)
HGB UR QL STRIP: NEGATIVE
HIV 1+2 AB+HIV1 P24 AG SERPL QL IA: NORMAL
HYALINE CASTS UR QL AUTO: 14 /LPF
IMM GRANULOCYTES # BLD AUTO: 0.07 K/UL (ref 0–0.04)
IMM GRANULOCYTES NFR BLD AUTO: 0.7 % (ref 0–0.5)
KETONES UR QL STRIP: NEGATIVE
LEUKOCYTE ESTERASE UR QL STRIP: ABNORMAL
LIPASE SERPL-CCNC: 70 U/L (ref 4–60)
LYMPHOCYTES # BLD AUTO: 1.2 K/UL (ref 1–4.8)
LYMPHOCYTES NFR BLD: 12.1 % (ref 18–48)
MCH RBC QN AUTO: 30.4 PG (ref 27–31)
MCHC RBC AUTO-ENTMCNC: 32.4 G/DL (ref 32–36)
MCV RBC AUTO: 94 FL (ref 82–98)
MICROSCOPIC COMMENT: ABNORMAL
MONOCYTES # BLD AUTO: 0.8 K/UL (ref 0.3–1)
MONOCYTES NFR BLD: 8.5 % (ref 4–15)
NEUTROPHILS # BLD AUTO: 7.5 K/UL (ref 1.8–7.7)
NEUTROPHILS NFR BLD: 77.5 % (ref 38–73)
NITRITE UR QL STRIP: NEGATIVE
NRBC BLD-RTO: 0 /100 WBC
PH UR STRIP: 5 [PH] (ref 5–8)
PLATELET # BLD AUTO: 305 K/UL (ref 150–450)
PMV BLD AUTO: 10.6 FL (ref 9.2–12.9)
POTASSIUM SERPL-SCNC: 3.8 MMOL/L (ref 3.5–5.1)
PROT SERPL-MCNC: 6.8 G/DL (ref 6–8.4)
PROT UR QL STRIP: ABNORMAL
RBC # BLD AUTO: 3.82 M/UL (ref 4–5.4)
RBC #/AREA URNS AUTO: 0 /HPF (ref 0–4)
SODIUM SERPL-SCNC: 140 MMOL/L (ref 136–145)
SP GR UR STRIP: 1.02 (ref 1–1.03)
SQUAMOUS #/AREA URNS AUTO: 4 /HPF
URN SPEC COLLECT METH UR: ABNORMAL
WBC # BLD AUTO: 9.7 K/UL (ref 3.9–12.7)
WBC #/AREA URNS AUTO: 4 /HPF (ref 0–5)

## 2023-10-21 PROCEDURE — 25000003 PHARM REV CODE 250

## 2023-10-21 PROCEDURE — 85025 COMPLETE CBC W/AUTO DIFF WBC: CPT

## 2023-10-21 PROCEDURE — 63600175 PHARM REV CODE 636 W HCPCS

## 2023-10-21 PROCEDURE — 94761 N-INVAS EAR/PLS OXIMETRY MLT: CPT

## 2023-10-21 PROCEDURE — 80053 COMPREHEN METABOLIC PANEL: CPT

## 2023-10-21 PROCEDURE — 96365 THER/PROPH/DIAG IV INF INIT: CPT

## 2023-10-21 PROCEDURE — 99284 EMERGENCY DEPT VISIT MOD MDM: CPT

## 2023-10-21 PROCEDURE — 86803 HEPATITIS C AB TEST: CPT | Performed by: PHYSICIAN ASSISTANT

## 2023-10-21 PROCEDURE — 83690 ASSAY OF LIPASE: CPT

## 2023-10-21 PROCEDURE — 81001 URINALYSIS AUTO W/SCOPE: CPT

## 2023-10-21 PROCEDURE — 87389 HIV-1 AG W/HIV-1&-2 AB AG IA: CPT | Performed by: PHYSICIAN ASSISTANT

## 2023-10-21 PROCEDURE — 82272 OCCULT BLD FECES 1-3 TESTS: CPT

## 2023-10-21 RX ORDER — SODIUM CHLORIDE, SODIUM LACTATE, POTASSIUM CHLORIDE, CALCIUM CHLORIDE 600; 310; 30; 20 MG/100ML; MG/100ML; MG/100ML; MG/100ML
INJECTION, SOLUTION INTRAVENOUS CONTINUOUS
Status: ACTIVE | OUTPATIENT
Start: 2023-10-22 | End: 2023-10-22

## 2023-10-21 RX ADMIN — CEFAZOLIN 2000 MG: 2 INJECTION, POWDER, FOR SOLUTION INTRAMUSCULAR; INTRAVENOUS at 11:10

## 2023-10-21 RX ADMIN — SODIUM CHLORIDE 1000 ML: 9 INJECTION, SOLUTION INTRAVENOUS at 11:10

## 2023-10-22 VITALS
BODY MASS INDEX: 31.44 KG/M2 | OXYGEN SATURATION: 94 % | RESPIRATION RATE: 16 BRPM | HEART RATE: 88 BPM | WEIGHT: 161 LBS | DIASTOLIC BLOOD PRESSURE: 68 MMHG | SYSTOLIC BLOOD PRESSURE: 112 MMHG | TEMPERATURE: 99 F

## 2023-10-22 LAB
ANION GAP SERPL CALC-SCNC: 10 MMOL/L (ref 8–16)
ANION GAP SERPL CALC-SCNC: 12 MMOL/L (ref 8–16)
BUN SERPL-MCNC: 13 MG/DL (ref 8–23)
BUN SERPL-MCNC: 18 MG/DL (ref 8–23)
C DIFF GDH STL QL: NEGATIVE
C DIFF TOX A+B STL QL IA: NEGATIVE
CALCIUM SERPL-MCNC: 8 MG/DL (ref 8.7–10.5)
CALCIUM SERPL-MCNC: 8 MG/DL (ref 8.7–10.5)
CHLORIDE SERPL-SCNC: 112 MMOL/L (ref 95–110)
CHLORIDE SERPL-SCNC: 114 MMOL/L (ref 95–110)
CO2 SERPL-SCNC: 20 MMOL/L (ref 23–29)
CO2 SERPL-SCNC: 21 MMOL/L (ref 23–29)
CREAT SERPL-MCNC: 0.9 MG/DL (ref 0.5–1.4)
CREAT SERPL-MCNC: 1.1 MG/DL (ref 0.5–1.4)
EST. GFR  (NO RACE VARIABLE): 51.8 ML/MIN/1.73 M^2
EST. GFR  (NO RACE VARIABLE): >60 ML/MIN/1.73 M^2
GLUCOSE SERPL-MCNC: 123 MG/DL (ref 70–110)
GLUCOSE SERPL-MCNC: 88 MG/DL (ref 70–110)
POTASSIUM SERPL-SCNC: 3.2 MMOL/L (ref 3.5–5.1)
POTASSIUM SERPL-SCNC: 3.7 MMOL/L (ref 3.5–5.1)
SODIUM SERPL-SCNC: 144 MMOL/L (ref 136–145)
SODIUM SERPL-SCNC: 145 MMOL/L (ref 136–145)
WBC #/AREA STL HPF: NORMAL /[HPF]

## 2023-10-22 PROCEDURE — 96367 TX/PROPH/DG ADDL SEQ IV INF: CPT

## 2023-10-22 PROCEDURE — 87045 FECES CULTURE AEROBIC BACT: CPT

## 2023-10-22 PROCEDURE — 87338 HPYLORI STOOL AG IA: CPT

## 2023-10-22 PROCEDURE — 89055 LEUKOCYTE ASSESSMENT FECAL: CPT

## 2023-10-22 PROCEDURE — 80048 BASIC METABOLIC PNL TOTAL CA: CPT | Mod: 91

## 2023-10-22 PROCEDURE — 63600175 PHARM REV CODE 636 W HCPCS

## 2023-10-22 PROCEDURE — 96366 THER/PROPH/DIAG IV INF ADDON: CPT

## 2023-10-22 PROCEDURE — 87427 SHIGA-LIKE TOXIN AG IA: CPT | Mod: 59

## 2023-10-22 PROCEDURE — G0378 HOSPITAL OBSERVATION PER HR: HCPCS

## 2023-10-22 PROCEDURE — 87046 STOOL CULTR AEROBIC BACT EA: CPT

## 2023-10-22 PROCEDURE — 82653 EL-1 FECAL QUANTITATIVE: CPT

## 2023-10-22 PROCEDURE — 87449 NOS EACH ORGANISM AG IA: CPT

## 2023-10-22 PROCEDURE — 96361 HYDRATE IV INFUSION ADD-ON: CPT

## 2023-10-22 PROCEDURE — 82705 FATS/LIPIDS FECES QUAL: CPT

## 2023-10-22 PROCEDURE — 25000003 PHARM REV CODE 250

## 2023-10-22 PROCEDURE — 87425 ROTAVIRUS AG IA: CPT

## 2023-10-22 PROCEDURE — 87449 NOS EACH ORGANISM AG IA: CPT | Mod: 91

## 2023-10-22 PROCEDURE — 63600175 PHARM REV CODE 636 W HCPCS: Performed by: STUDENT IN AN ORGANIZED HEALTH CARE EDUCATION/TRAINING PROGRAM

## 2023-10-22 PROCEDURE — 80048 BASIC METABOLIC PNL TOTAL CA: CPT | Performed by: STUDENT IN AN ORGANIZED HEALTH CARE EDUCATION/TRAINING PROGRAM

## 2023-10-22 RX ORDER — ENOXAPARIN SODIUM 100 MG/ML
30 INJECTION SUBCUTANEOUS EVERY 24 HOURS
Status: DISCONTINUED | OUTPATIENT
Start: 2023-10-22 | End: 2023-10-22 | Stop reason: HOSPADM

## 2023-10-22 RX ORDER — NALOXONE HCL 0.4 MG/ML
0.02 VIAL (ML) INJECTION
Status: DISCONTINUED | OUTPATIENT
Start: 2023-10-22 | End: 2023-10-22 | Stop reason: HOSPADM

## 2023-10-22 RX ORDER — ACETAMINOPHEN 325 MG/1
TABLET ORAL
COMMUNITY

## 2023-10-22 RX ORDER — ONDANSETRON 8 MG/1
8 TABLET, ORALLY DISINTEGRATING ORAL EVERY 8 HOURS PRN
Status: DISCONTINUED | OUTPATIENT
Start: 2023-10-22 | End: 2023-10-22 | Stop reason: HOSPADM

## 2023-10-22 RX ORDER — SODIUM CHLORIDE 0.9 % (FLUSH) 0.9 %
1-10 SYRINGE (ML) INJECTION EVERY 12 HOURS PRN
Status: DISCONTINUED | OUTPATIENT
Start: 2023-10-22 | End: 2023-10-22 | Stop reason: HOSPADM

## 2023-10-22 RX ORDER — DICLOFENAC SODIUM 10 MG/G
GEL TOPICAL
COMMUNITY

## 2023-10-22 RX ORDER — MAGNESIUM SULFATE HEPTAHYDRATE 40 MG/ML
2 INJECTION, SOLUTION INTRAVENOUS ONCE
Status: COMPLETED | OUTPATIENT
Start: 2023-10-22 | End: 2023-10-22

## 2023-10-22 RX ORDER — MAG HYDROX/ALUMINUM HYD/SIMETH 200-200-20
30 SUSPENSION, ORAL (FINAL DOSE FORM) ORAL 4 TIMES DAILY PRN
Status: DISCONTINUED | OUTPATIENT
Start: 2023-10-22 | End: 2023-10-22 | Stop reason: HOSPADM

## 2023-10-22 RX ORDER — SIMETHICONE 80 MG
1 TABLET,CHEWABLE ORAL 4 TIMES DAILY PRN
Status: DISCONTINUED | OUTPATIENT
Start: 2023-10-22 | End: 2023-10-22 | Stop reason: HOSPADM

## 2023-10-22 RX ORDER — ACETAMINOPHEN 325 MG/1
650 TABLET ORAL EVERY 8 HOURS PRN
Status: DISCONTINUED | OUTPATIENT
Start: 2023-10-22 | End: 2023-10-22 | Stop reason: HOSPADM

## 2023-10-22 RX ORDER — LOPERAMIDE HYDROCHLORIDE 2 MG/1
2 CAPSULE ORAL 4 TIMES DAILY
Status: DISCONTINUED | OUTPATIENT
Start: 2023-10-22 | End: 2023-10-22

## 2023-10-22 RX ORDER — TALC
6 POWDER (GRAM) TOPICAL NIGHTLY PRN
Status: DISCONTINUED | OUTPATIENT
Start: 2023-10-22 | End: 2023-10-22 | Stop reason: HOSPADM

## 2023-10-22 RX ORDER — LOPERAMIDE HYDROCHLORIDE 2 MG/1
2 CAPSULE ORAL 4 TIMES DAILY PRN
Qty: 40 CAPSULE | Refills: 0 | Status: SHIPPED | OUTPATIENT
Start: 2023-10-22 | End: 2023-11-01

## 2023-10-22 RX ORDER — POLYETHYLENE GLYCOL 3350 17 G/17G
17 POWDER, FOR SOLUTION ORAL DAILY PRN
Status: DISCONTINUED | OUTPATIENT
Start: 2023-10-22 | End: 2023-10-22 | Stop reason: HOSPADM

## 2023-10-22 RX ORDER — ACETAMINOPHEN 325 MG/1
650 TABLET ORAL EVERY 4 HOURS PRN
Status: DISCONTINUED | OUTPATIENT
Start: 2023-10-22 | End: 2023-10-22 | Stop reason: HOSPADM

## 2023-10-22 RX ORDER — POTASSIUM CHLORIDE 20 MEQ/1
40 TABLET, EXTENDED RELEASE ORAL ONCE
Status: COMPLETED | OUTPATIENT
Start: 2023-10-22 | End: 2023-10-22

## 2023-10-22 RX ORDER — LOPERAMIDE HYDROCHLORIDE 2 MG/1
2 CAPSULE ORAL 4 TIMES DAILY
Status: DISCONTINUED | OUTPATIENT
Start: 2023-10-22 | End: 2023-10-22 | Stop reason: HOSPADM

## 2023-10-22 RX ADMIN — SODIUM CHLORIDE, POTASSIUM CHLORIDE, SODIUM LACTATE AND CALCIUM CHLORIDE: 600; 310; 30; 20 INJECTION, SOLUTION INTRAVENOUS at 01:10

## 2023-10-22 RX ADMIN — SODIUM CHLORIDE, POTASSIUM CHLORIDE, SODIUM LACTATE AND CALCIUM CHLORIDE: 600; 310; 30; 20 INJECTION, SOLUTION INTRAVENOUS at 07:10

## 2023-10-22 RX ADMIN — LOPERAMIDE HYDROCHLORIDE 2 MG: 2 CAPSULE ORAL at 03:10

## 2023-10-22 RX ADMIN — LOPERAMIDE HYDROCHLORIDE 2 MG: 2 CAPSULE ORAL at 05:10

## 2023-10-22 RX ADMIN — POTASSIUM CHLORIDE 40 MEQ: 1500 TABLET, EXTENDED RELEASE ORAL at 07:10

## 2023-10-22 RX ADMIN — MAGNESIUM SULFATE HEPTAHYDRATE 2 G: 40 INJECTION, SOLUTION INTRAVENOUS at 07:10

## 2023-10-22 NOTE — HOSPITAL COURSE
Ms. Duncan was placed in observation for ADDISON in the setting of acute diarrheal illness. C. Diff negative. Remaining stool studies pending. On my evaluation this morning, her ADDISON has resolved, her diarrhea is improving, and she is eager to discharge home. All questions were answered. Patient acknowledged understanding of discharge instructions and feels safe to discharge home. Patient was discharged on 10/22/2023 in stable condition with PCP and GI follow-up. Education regarding condition provided and return precautions given.

## 2023-10-22 NOTE — PHARMACY MED REC
"Admission Medication History     The home medication history was taken by Tony Mosqueda.    You may go to "Admission" then "Reconcile Home Medications" tabs to review and/or act upon these items.     The home medication list has been updated by the Pharmacy department.   Please read ALL comments highlighted in yellow.   Please address this information as you see fit.    Feel free to contact us if you have any questions or require assistance.      The medications listed below were removed from the home medication list. Please reorder if appropriate:  Patient reports no longer taking the following medication(s):  BENZONATATE 100 MG  CLOBETASOL 0.05 % GEL  FOLIC ACID 800 MCG   METHOCARBAMOL 500 MG  TRIAMCINOLONE ACETONIDE 0.1 % CREAM  TRIAMCINOLONE ACETONIDE 0.1 % PASTE    Medications listed below were obtained from: Patient  Current Outpatient Medications on File Prior to Encounter   Medication Sig    acetaminophen (TYLENOL) 325 MG tablet   Take 1 tablet by mouth every morning and 1 to 2 tablets every night at bedtime.    albuterol (PROVENTIL/VENTOLIN HFA) 90 mcg/actuation inhaler   Inhale 2 puffs into the lungs every 6 (six) hours as needed for Wheezing or Shortness of Breath.    amLODIPine (NORVASC) 5 MG tablet   Take 5 mg by mouth once daily.     atorvastatin (LIPITOR) 20 MG tablet   Take 20 mg by mouth once daily.     BREO ELLIPTA 200-25 mcg/dose DsDv diskus inhaler   Inhale 1 puff into the lungs once daily.    calcium carbonate/vitamin D3 (VITAMIN D-3 ORAL)   Take 1 capsule by mouth once daily.    diclofenac sodium (VOLTAREN) 1 % Gel   Apply 2 g topically 1 to 3 times daily as needed for pain.    esomeprazole (NEXIUM) 40 MG capsule   Take 40 mg by mouth once daily.    fluticasone propionate (FLONASE) 50 mcg/actuation nasal spray   1 spray (50 mcg total) by Each Nostril route 2 (two) times daily as needed for Rhinitis.    furosemide (LASIX) 20 MG tablet   Take 20 mg by mouth every other day.    gabapentin " (NEURONTIN) 100 MG capsule   Take 100 mg by mouth 3 (three) times daily.    Leflunomide (ARAVA) 20 MG Tab   Take 20 mg by mouth once daily.    lisinopril-hydrochlorothiazide (PRINZIDE,ZESTORETIC) 20-12.5 mg per tablet   Take 1 tablet by mouth once daily.     nystatin (MYCOSTATIN) cream   Apply topically as needed for fungal infection.      predniSONE (DELTASONE) 1 MG tablet   Take 2 mg by mouth every morning.    SITagliptin (JANUVIA) 50 MG Tab   Take 1 tablet by mouth once daily.     Potential issues to be addressed PRIOR TO DISCHARGE  Patient reported not taking the following medications: (METHOTREXATE). These medications remain on the home medication list. Please address accordingly.           Tony Mosqueda  EXT [phone]                  .

## 2023-10-22 NOTE — ED PROVIDER NOTES
"Encounter Date: 10/21/2023       History     Chief Complaint   Patient presents with    Facial Swelling     C/o facial    Oral Swelling     C/o oral and face swelling starting around 6pm tonight. Pt reports this has happened before, but doesn't remember why. Reports "feeling like shes choking". Tolerating secretions in triage, denies SOB     78-year-old female with a past medical history of GERD, HTN, PACs and RA presents to the ED complaining of multiple episodes of nonbloody diarrhea since Wednesday and intermittent allergic reactions.  Patient states that she had a UTI the end of September and was prescribed Macrobid.  She had episodes of diarrhea with the Macrobid and she called her doctor and she was then prescribed ciprofloxacin.  She is currently in the middle for ciprofloxacin course and has reported of greater than 10 episodes of nonbloody diarrhea since Wednesday.  She also notes of intermittent allergic reactions resulting in left-sided facial swelling and right-sided facial swelling last week with the most recent episode causing tongue swelling with difficulty swallowing earlier today.  She does not know the inciting factor, however, she states that they have all resolved with Benadryl.  Currently she does not report of any tongue swelling or difficulty swallowing.  No other complaints this time.    The history is provided by the patient and medical records.     Review of patient's allergies indicates:   Allergen Reactions    Iodine      Swelling (throat)^  Unsure of what drug/food triggered the reaction      Adhesive Itching    Penicillins      Past Medical History:   Diagnosis Date    Arthritis     GERD (gastroesophageal reflux disease)     HTN (hypertension)     Premature atrial complexes     Rheumatoid arthritis      Past Surgical History:   Procedure Laterality Date    LUMBAR SPINE SURGERY       Family History   Problem Relation Age of Onset    Breast cancer Neg Hx     Colon cancer Neg Hx     " Ovarian cancer Neg Hx      Social History     Tobacco Use    Smoking status: Never    Smokeless tobacco: Never   Substance Use Topics    Alcohol use: Yes     Comment: occ    Drug use: Never     Review of Systems    Physical Exam     Initial Vitals [10/21/23 2035]   BP Pulse Resp Temp SpO2   139/60 87 19 97.9 °F (36.6 °C) (!) 94 %      MAP       --         Physical Exam    Nursing note and vitals reviewed.  Constitutional: Vital signs are normal. She appears well-developed and well-nourished. She is not diaphoretic. No distress.   HENT:   Head: Normocephalic and atraumatic.   Right Ear: External ear normal.   Left Ear: External ear normal.   Normal posterior oropharynx.   Eyes: EOM are normal. Right eye exhibits no discharge. Left eye exhibits no discharge.   Neck: Trachea normal. Neck supple. No thyroid mass present.   Normal range of motion.  Cardiovascular:  Normal rate, regular rhythm, normal heart sounds and intact distal pulses.     Exam reveals no gallop and no friction rub.       No murmur heard.  Pulmonary/Chest: Breath sounds normal. No respiratory distress. She has no wheezes. She has no rhonchi. She has no rales.   Abdominal: Abdomen is soft. Bowel sounds are normal. She exhibits no distension. There is no abdominal tenderness. There is no rebound and no guarding.   Musculoskeletal:         General: No tenderness or edema. Normal range of motion.      Cervical back: Normal range of motion and neck supple.     Neurological: She is alert and oriented to person, place, and time. She has normal strength. No cranial nerve deficit or sensory deficit. GCS score is 15. GCS eye subscore is 4. GCS verbal subscore is 5. GCS motor subscore is 6.   Skin: Skin is warm and dry. Capillary refill takes less than 2 seconds. No rash noted.   Psychiatric: She has a normal mood and affect.         ED Course   Procedures  Labs Reviewed   CBC W/ AUTO DIFFERENTIAL - Abnormal; Notable for the following components:       Result  Value    RBC 3.82 (*)     Hemoglobin 11.6 (*)     Hematocrit 35.8 (*)     RDW 15.2 (*)     Immature Granulocytes 0.7 (*)     Immature Grans (Abs) 0.07 (*)     Gran % 77.5 (*)     Lymph % 12.1 (*)     All other components within normal limits   COMPREHENSIVE METABOLIC PANEL - Abnormal; Notable for the following components:    Chloride 111 (*)     CO2 19 (*)     Glucose 165 (*)     BUN 24 (*)     Creatinine 1.5 (*)     Albumin 3.3 (*)     Alkaline Phosphatase 49 (*)     eGFR 35.7 (*)     All other components within normal limits   URINALYSIS, REFLEX TO URINE CULTURE - Abnormal; Notable for the following components:    Appearance, UA Hazy (*)     Protein, UA Trace (*)     Leukocytes, UA 1+ (*)     All other components within normal limits    Narrative:     Specimen Source->Urine   LIPASE - Abnormal; Notable for the following components:    Lipase 70 (*)     All other components within normal limits   URINALYSIS MICROSCOPIC - Abnormal; Notable for the following components:    Hyaline Casts, UA 14 (*)     All other components within normal limits    Narrative:     Specimen Source->Urine   CLOSTRIDIUM DIFFICILE   CULTURE, STOOL   HIV 1 / 2 ANTIBODY    Narrative:     Release to patient->Immediate   HEPATITIS C ANTIBODY    Narrative:     Release to patient->Immediate   H. PYLORI ANTIGEN, STOOL   PANCREATIC ELASTASE, FECAL   FECAL FAT, QUALITATIVE   OCCULT BLOOD X 1, STOOL   WBC, STOOL   ROTAVIRUS ANTIGEN, STOOL   CALPROTECTIN, STOOL   GIARDIA/CRYPTOSPORIDIUM (EIA)   STOOL EXAM-OVA,CYSTS,PARASITES     EKG Readings: (Independently Interpreted)   109 beats per minute.  Normal axis.  PACs noted.  No obvious ST segment or T-wave ischemic changes noted.       Imaging Results    None          Medications   sodium chloride 0.9% bolus 1,000 mL 1,000 mL (1,000 mLs Intravenous New Bag 10/21/23 5983)   ceFAZolin 2,000 mg in dextrose 5 % in water (D5W) 50 mL IVPB (MB+) (2,000 mg Intravenous New Bag 10/21/23 2333)   lactated ringers  infusion (has no administration in time range)     Medical Decision Making  78-year-old female who appears to be in NAD presents the ED with family bedside complaining of multiple episodes of nonbloody diarrhea and allergic reactions.  ABC's intact.  Initial triage vital signs are within normal limits.    Differential diagnosis includes but is not limited to Clostridium difficile, electrolyte abnormality, anemia, dehydration, ADDISON, inflammatory diarrhea, medication adverse effect.    Amount and/or Complexity of Data Reviewed  Labs: ordered. Decision-making details documented in ED Course.               ED Course as of 10/21/23 2351   Sat Oct 21, 2023   2235 CBC auto differential(!)  No leukocytosis.  Mild anemia - no signs of active hemorrhage at this time. [BG]   2235 Comprehensive metabolic panel(!)  Decrease in bicarb likely secondary to dehydration.  Elevated creatinine points to an ADDISON.  I will give the patient IV fluids. [BG]   2236 Lipase(!): 70 [BG]   2321 Urinalysis, Reflex to Urine Culture Urine, Clean Catch(!)  No UTI. [BG]   2341 I explained the lab results to the patient.  I explained to the patient that she has an ADDISON and I am also concerned about a Clostridium difficile infection due to her recent antibiotic use.  I told her I will likely admitted to the hospital with and she understands and agrees with the plan. [BG]      ED Course User Index  [BG] Davonte Allen MD                    Clinical Impression:   Final diagnoses:  [N17.9] ADDISON (acute kidney injury)        ED Disposition Condition    Observation                 Davonte Allen MD  Resident  10/21/23 4701

## 2023-10-22 NOTE — ED TRIAGE NOTES
77 y/o F presents to ER with c/c diarrhea and facial swelling. Pt states that she has had diarrhea for several weeks, but was able to control it last week and it started back up on Wednesday. Pt describes diarrhea as dark green, uncontrollable, and occurs several times a day. Pt endorses L side facial swelling while eating something today, and now has difficulty swallowing. Pt denies c/p, nausea, fevers and chills.

## 2023-10-22 NOTE — ASSESSMENT & PLAN NOTE
Presents with ongoing acute diarrhea, and she is at high risk for C Diff/infectious diarrhea given her immunocompromised status and multiple recent courses of antibiotics. Stool studies pending, and will treat accordingly. If C diff negative, can likely start Imodium. May benefit from outpatient GI follow-up if workup negative given the somewhat chronic nature of the diarrhea.

## 2023-10-22 NOTE — H&P
"[facility] - Emergency Dept  [facility]  History & Physical    Patient Name: Jocelyne Duncan  MRN: [id number]  Patient Class: OP- Observation  Admission Date: 10/21/2023  Attending Physician: Godwin Mccormick MD   Primary Care Provider: Natali Barboza FNP         Patient information was obtained from patient and ER records.     Subjective:     Principal Problem:ADDISON (acute kidney injury)    Chief Complaint:   Chief Complaint   Patient presents with    Facial Swelling     C/o facial    Oral Swelling     C/o oral and face swelling starting around 6pm tonight. Pt reports this has happened before, but doesn't remember why. Reports "feeling like shes choking". Tolerating secretions in triage, denies SOB        HPI: Jocelyne Duncan is a 78 y.o. female with RA on chronic Prednisone, HTN, HLD, anemia who presents today with diarrhea.     She reports diarrhea over the past 3-4 weeks, which comes and goes. Prior to this, she had normal, regular BMs. She has taken Macrobid and Cipro given by her rheumatologist for "UTI." She was evaluated 10/9 in the [facility] ED and given IVF then discharged home. Diarrhea overall resolved, however on Wednesday it returned and was worse than before. She describes multiple watery, non-bloody BMs per day, exacerbated with any PO intake. She has had some nausea but no vomiting, abdominal pain, dysuria, or fever. She also reports some intermittent tongue and face swelling resolved with Benadryl. Given her ongoing diarrhea, she presented to the ED.       Past Medical History:   Diagnosis Date    Arthritis     GERD (gastroesophageal reflux disease)     HTN (hypertension)     Premature atrial complexes     Rheumatoid arthritis        Past Surgical History:   Procedure Laterality Date    LUMBAR SPINE SURGERY         Review of patient's allergies indicates:   Allergen Reactions    Iodine      Swelling (throat)^  Unsure of what drug/food triggered the reaction      Adhesive Itching    " Penicillins        No current facility-administered medications on file prior to encounter.     Current Outpatient Medications on File Prior to Encounter   Medication Sig    albuterol (PROVENTIL/VENTOLIN HFA) 90 mcg/actuation inhaler SMARTSI Puff(s) By Mouth Every 6 Hours PRN    amLODIPine (NORVASC) 5 MG tablet Take 5 mg by mouth once daily.     atorvastatin (LIPITOR) 20 MG tablet Take 20 mg by mouth once daily.     benzonatate (TESSALON) 100 MG capsule Take 100 mg by mouth 3 (three) times daily as needed.    BREO ELLIPTA 200-25 mcg/dose DsDv diskus inhaler 1 puff once daily.    clobetasoL (TEMOVATE) 0.05 % Gel daily as needed.    diclofenac (VOLTAREN) 75 MG EC tablet Take 75 mg by mouth 2 (two) times daily.    esomeprazole (NEXIUM) 40 MG capsule Take 40 mg by mouth once daily.    fluticasone propionate (FLONASE) 50 mcg/actuation nasal spray 1 spray (50 mcg total) by Each Nostril route 2 (two) times daily as needed for Rhinitis.    folic acid (FOLVITE) 800 MCG Tab Take 800 mcg by mouth once daily.    furosemide (LASIX) 20 MG tablet Take 20 mg by mouth once daily.     gabapentin (NEURONTIN) 300 MG capsule Take 300 mg by mouth 3 (three) times daily.    leflunomide (ARAVA) 20 MG Tab Take 20 mg by mouth once daily.    lisinopril-hydrochlorothiazide (PRINZIDE,ZESTORETIC) 20-12.5 mg per tablet Take 1 tablet by mouth once daily.     methocarbamoL (ROBAXIN) 500 MG Tab Take 500 mg by mouth 3 (three) times daily as needed.    methotrexate 2.5 MG Tab every 7 days.     nystatin (MYCOSTATIN) cream Apply topically as needed.    predniSONE (DELTASONE) 2.5 MG tablet Take 5 mg by mouth.    SITagliptin (JANUVIA) 50 MG Tab Take by mouth once daily.    triamcinolone acetonide 0.1% (KENALOG) 0.1 % cream 2 (two) times daily. apply to affected area    triamcinolone acetonide 0.1% (KENALOG) 0.1 % paste SMARTSIG:TO TEETH    vitamin D3-folic acid 5,000 unit- 1 mg Tab Take 1 tablet by mouth once daily.     Family  History    None       Tobacco Use    Smoking status: Never    Smokeless tobacco: Never   Substance and Sexual Activity    Alcohol use: Yes     Comment: occ    Drug use: Never    Sexual activity: Not Currently     Partners: Male     Birth control/protection: Post-menopausal     Review of Systems   All other systems reviewed and are negative.    Objective:     Vital Signs (Most Recent):  Temp: 98.6 °F (37 °C) (10/21/23 2105)  Pulse: 86 (10/21/23 2301)  Resp: 20 (10/21/23 2301)  BP: 126/74 (10/21/23 2301)  SpO2: 95 % (10/21/23 2301) Vital Signs (24h Range):  Temp:  [97.9 °F (36.6 °C)-98.6 °F (37 °C)] 98.6 °F (37 °C)  Pulse:  [79-91] 86  Resp:  [19-22] 20  SpO2:  [94 %-96 %] 95 %  BP: (126-153)/() 126/74     Weight: 73 kg (161 lb)  Body mass index is 31.44 kg/m².     Physical Exam  Vitals and nursing note reviewed.   Constitutional:       General: She is not in acute distress.     Appearance: She is well-developed. She is not diaphoretic.   HENT:      Head: Normocephalic and atraumatic.   Eyes:      General: No scleral icterus.     Conjunctiva/sclera: Conjunctivae normal.   Neck:      Vascular: No JVD.   Cardiovascular:      Rate and Rhythm: Normal rate and regular rhythm.   Pulmonary:      Effort: Pulmonary effort is normal. No respiratory distress.   Abdominal:      General: There is no distension.      Comments: Hyperactive bowel sounds   Musculoskeletal:      Right lower leg: Edema present.      Left lower leg: Edema present.   Skin:     Coloration: Skin is not jaundiced or pale.   Neurological:      Mental Status: She is alert and oriented to person, place, and time.      Motor: No abnormal muscle tone.   Psychiatric:         Mood and Affect: Mood normal.         Behavior: Behavior normal.                Significant Labs: All pertinent labs within the past 24 hours have been reviewed.  CBC:   Recent Labs   Lab 10/21/23  2139   WBC 9.70   HGB 11.6*   HCT 35.8*        CMP:   Recent Labs   Lab  10/21/23  2139      K 3.8   *   CO2 19*   *   BUN 24*   CREATININE 1.5*   CALCIUM 8.9   PROT 6.8   ALBUMIN 3.3*   BILITOT 0.4   ALKPHOS 49*   AST 19   ALT 12   ANIONGAP 10       Significant Imaging: I have reviewed all pertinent imaging results/findings within the past 24 hours.    Decision made to admit     Assessment/Plan:     * ADDISON (acute kidney injury)  Cr 1.5 on presentation, and previous baseline appears to be around 1.0. Suspect due to volume depletion from diarrhea In the setting of chronic diuretic and ACEi use.  Estimated Creatinine Clearance: 28 mL/min (A) (based on SCr of 1.5 mg/dL (H)). according to latest data. Will give IVF repletion and maintenance. Monitor urine output and serial BMP and adjust therapy as needed. Avoid nephrotoxins and renally dose meds for GFR listed above.    Infectious diarrhea  Presents with ongoing acute diarrhea, and she is at high risk for C Diff/infectious diarrhea given her immunocompromised status and multiple recent courses of antibiotics. Stool studies pending, and will treat accordingly. If C diff negative, can likely start Imodium. May benefit from outpatient GI follow-up if workup negative given the somewhat chronic nature of the diarrhea.     Metabolic acidosis  Likely due to GI losses and ADDISON. Monitor on subsequent labs.       Immunosuppressed status  Due to RA and Prednisone use. Workup of diarrhea as below.         Other hyperlipidemia  Hold home statin in the setting of ADDISON. Resume when improved.       Essential hypertension  Hold home antihypertensives in the setting of diarrhea and ADDISON. Resume as warranted.       Rheumatoid arthritis  Continue home Prednisone. Reports that she is no longer on MTX.       VTE Risk Mitigation (From admission, onward)         Ordered     enoxaparin injection 30 mg  Daily         10/22/23 0034     IP VTE HIGH RISK PATIENT  Once         10/22/23 0034     Place sequential compression device  Until discontinued          10/22/23 0034                   On 10/22/2023, patient should be placed in hospital observation services under my care.    Patient is FULL CODE on discussion with her.  Patient's surrogate decision maker is her .      Luis Li MD  Department of Hospital Medicine  [facility] - Emergency Dept    N/A  Family history non-contributory to current problem   Pertinent information:

## 2023-10-22 NOTE — NURSING
Sent off STAT BMP to the lab two separate times with them both being hemolyzed. Megan Ingram made aware of the hold up on pts discharge.

## 2023-10-22 NOTE — SUBJECTIVE & OBJECTIVE
Past Medical History:   Diagnosis Date    Arthritis     GERD (gastroesophageal reflux disease)     HTN (hypertension)     Premature atrial complexes     Rheumatoid arthritis        Past Surgical History:   Procedure Laterality Date    LUMBAR SPINE SURGERY         Review of patient's allergies indicates:   Allergen Reactions    Iodine      Swelling (throat)^  Unsure of what drug/food triggered the reaction      Adhesive Itching    Penicillins        No current facility-administered medications on file prior to encounter.     Current Outpatient Medications on File Prior to Encounter   Medication Sig    albuterol (PROVENTIL/VENTOLIN HFA) 90 mcg/actuation inhaler SMARTSI Puff(s) By Mouth Every 6 Hours PRN    amLODIPine (NORVASC) 5 MG tablet Take 5 mg by mouth once daily.     atorvastatin (LIPITOR) 20 MG tablet Take 20 mg by mouth once daily.     benzonatate (TESSALON) 100 MG capsule Take 100 mg by mouth 3 (three) times daily as needed.    BREO ELLIPTA 200-25 mcg/dose DsDv diskus inhaler 1 puff once daily.    clobetasoL (TEMOVATE) 0.05 % Gel daily as needed.    diclofenac (VOLTAREN) 75 MG EC tablet Take 75 mg by mouth 2 (two) times daily.    esomeprazole (NEXIUM) 40 MG capsule Take 40 mg by mouth once daily.    fluticasone propionate (FLONASE) 50 mcg/actuation nasal spray 1 spray (50 mcg total) by Each Nostril route 2 (two) times daily as needed for Rhinitis.    folic acid (FOLVITE) 800 MCG Tab Take 800 mcg by mouth once daily.    furosemide (LASIX) 20 MG tablet Take 20 mg by mouth once daily.     gabapentin (NEURONTIN) 300 MG capsule Take 300 mg by mouth 3 (three) times daily.    leflunomide (ARAVA) 20 MG Tab Take 20 mg by mouth once daily.    lisinopril-hydrochlorothiazide (PRINZIDE,ZESTORETIC) 20-12.5 mg per tablet Take 1 tablet by mouth once daily.     methocarbamoL (ROBAXIN) 500 MG Tab Take 500 mg by mouth 3 (three) times daily as needed.    methotrexate 2.5 MG Tab every 7 days.     nystatin (MYCOSTATIN) cream  Apply topically as needed.    predniSONE (DELTASONE) 2.5 MG tablet Take 5 mg by mouth.    SITagliptin (JANUVIA) 50 MG Tab Take by mouth once daily.    triamcinolone acetonide 0.1% (KENALOG) 0.1 % cream 2 (two) times daily. apply to affected area    triamcinolone acetonide 0.1% (KENALOG) 0.1 % paste SMARTSIG:TO TEETH    vitamin D3-folic acid 5,000 unit- 1 mg Tab Take 1 tablet by mouth once daily.     Family History    None       Tobacco Use    Smoking status: Never    Smokeless tobacco: Never   Substance and Sexual Activity    Alcohol use: Yes     Comment: occ    Drug use: Never    Sexual activity: Not Currently     Partners: Male     Birth control/protection: Post-menopausal     Review of Systems   All other systems reviewed and are negative.    Objective:     Vital Signs (Most Recent):  Temp: 98.6 °F (37 °C) (10/21/23 2105)  Pulse: 86 (10/21/23 2301)  Resp: 20 (10/21/23 2301)  BP: 126/74 (10/21/23 2301)  SpO2: 95 % (10/21/23 2301) Vital Signs (24h Range):  Temp:  [97.9 °F (36.6 °C)-98.6 °F (37 °C)] 98.6 °F (37 °C)  Pulse:  [79-91] 86  Resp:  [19-22] 20  SpO2:  [94 %-96 %] 95 %  BP: (126-153)/() 126/74     Weight: 73 kg (161 lb)  Body mass index is 31.44 kg/m².     Physical Exam  Vitals and nursing note reviewed.   Constitutional:       General: She is not in acute distress.     Appearance: She is well-developed. She is not diaphoretic.   HENT:      Head: Normocephalic and atraumatic.   Eyes:      General: No scleral icterus.     Conjunctiva/sclera: Conjunctivae normal.   Neck:      Vascular: No JVD.   Cardiovascular:      Rate and Rhythm: Normal rate and regular rhythm.   Pulmonary:      Effort: Pulmonary effort is normal. No respiratory distress.   Abdominal:      General: There is no distension.      Comments: Hyperactive bowel sounds   Musculoskeletal:      Right lower leg: Edema present.      Left lower leg: Edema present.   Skin:     Coloration: Skin is not jaundiced or pale.   Neurological:      Mental  Status: She is alert and oriented to person, place, and time.      Motor: No abnormal muscle tone.   Psychiatric:         Mood and Affect: Mood normal.         Behavior: Behavior normal.                Significant Labs: All pertinent labs within the past 24 hours have been reviewed.  CBC:   Recent Labs   Lab 10/21/23  2139   WBC 9.70   HGB 11.6*   HCT 35.8*        CMP:   Recent Labs   Lab 10/21/23  2139      K 3.8   *   CO2 19*   *   BUN 24*   CREATININE 1.5*   CALCIUM 8.9   PROT 6.8   ALBUMIN 3.3*   BILITOT 0.4   ALKPHOS 49*   AST 19   ALT 12   ANIONGAP 10       Significant Imaging: I have reviewed all pertinent imaging results/findings within the past 24 hours.    Decision made to admit

## 2023-10-22 NOTE — ED NOTES
"Patient identifiers for Jocelyne Duncan 78 y.o. female checked and correct.  Chief Complaint   Patient presents with    Facial Swelling     C/o facial    Oral Swelling     C/o oral and face swelling starting around 6pm tonight. Pt reports this has happened before, but doesn't remember why. Reports "feeling like shes choking". Tolerating secretions in triage, denies SOB     Past Medical History:   Diagnosis Date    Arthritis     GERD (gastroesophageal reflux disease)     HTN (hypertension)     Premature atrial complexes     Rheumatoid arthritis      Allergies reported:   Review of patient's allergies indicates:   Allergen Reactions    Iodine      Swelling (throat)^  Unsure of what drug/food triggered the reaction      Adhesive Itching    Penicillins          LOC: Patient is awake, alert, and aware of environment with an appropriate affect. Patient is oriented x 4 and speaking appropriately.  APPEARANCE: Patient resting comfortably and in no acute distress. Patient is clean and well groomed, patient's clothing is properly fastened.  HEENT: - JVD, + midline trach. Endorses L side facial swelling with difficulty swallowing.  SKIN: The skin is warm and dry. Patient has normal skin turgor and moist mucus membranes.   MUSKULOSKELETAL: Patient is moving all extremities well, no obvious deformities noted. Pulses intact. PMS x 4   RESPIRATORY: Airway is open and patent. Respirations are spontaneous and non-labored with normal effort and rate. = CBBS  CARDIAC: Patient has a normal rate and rhythm. 91 on cardiac monitor. No peripheral edema noted. + 2 bilateral pedal and radial pulses, < 3 s cap refill.  ABDOMEN: No distention noted. Soft and non-tender upon palpation. Endorses dark green diarrhea.  NEUROLOGICAL: pupils 4 mm, PERRL. Facial expression is symmetrical. Hand grasps are equal bilaterally. Normal sensation in all extremities when touched with finger.         "

## 2023-10-22 NOTE — ED NOTES
Assumed pt care at this time. The patient is awake, alert and cooperative with a calm affect, patient is aware of environment. Airway is open and patent, respirations are spontaneous, normal respiratory effort and rate noted. Skin warm and dry, moves all extremities well. Appearance: no apparent distress noted. Family at bedside. Pt placed on continuous cardiac monitoring. Bed locked and in lowest position with side rails up, call light within reach. Pt states no needs at this time.

## 2023-10-22 NOTE — HPI
"Jocelyne Duncan is a 78 y.o. female with RA on chronic Prednisone, HTN, HLD, anemia who presents today with diarrhea.     She reports diarrhea over the past 3-4 weeks, which comes and goes. Prior to this, she had normal, regular BMs. She has taken Macrobid and Cipro given by her rheumatologist for "UTI." She was evaluated 10/9 in the [facility] ED and given IVF then discharged home. Diarrhea overall resolved, however on Wednesday it returned and was worse than before. She describes multiple watery, non-bloody BMs per day, exacerbated with any PO intake. She has had some nausea but no vomiting, abdominal pain, dysuria, or fever. She also reports some intermittent tongue and face swelling resolved with Benadryl. Given her ongoing diarrhea, she presented to the ED.   "

## 2023-10-22 NOTE — NURSING
Tele and PIV removed. Pt has received her discharge paperwork and states that she understands her follow-up care. OMA

## 2023-10-23 LAB
E COLI SXT1 STL QL IA: NEGATIVE
E COLI SXT2 STL QL IA: NEGATIVE
RV AG STL QL IA.RAPID: NEGATIVE

## 2023-10-24 LAB — BACTERIA STL CULT: NORMAL

## 2023-10-25 LAB
ELASTASE 1, FECAL: 294 MCG/G
FAT STL QL: NORMAL
NEUTRAL FAT STL QL: NORMAL

## 2023-10-31 LAB — H PYLORI AG STL QL IA: NOT DETECTED

## 2023-11-08 ENCOUNTER — TELEPHONE (OUTPATIENT)
Dept: GASTROENTEROLOGY | Facility: CLINIC | Age: 78
End: 2023-11-08
Payer: MEDICARE

## 2023-11-08 NOTE — TELEPHONE ENCOUNTER
----- Message from Livia Mendoza sent at 11/8/2023  4:08 PM CST -----  Regarding: apt  Contact: [phone]  Pt calling  to schedule  apt for N17.9 (ICD-10-CM) - ADDISON (acute kidney injury) please call to discuss Further

## 2024-01-03 ENCOUNTER — TELEPHONE (OUTPATIENT)
Dept: PULMONOLOGY | Facility: CLINIC | Age: 79
End: 2024-01-03
Payer: MEDICARE

## 2024-01-03 NOTE — TELEPHONE ENCOUNTER
----- Message from Sheri Benton sent at 1/3/2024  3:16 PM CST -----  Contact: [phone]  RESCHEDULE  Pt is calling to reschedule her appt she had an appt for 11/29 but, she states she was in the hospital at that time. No appt in  [facility] pls call pt at  [phone]

## 2024-01-03 NOTE — TELEPHONE ENCOUNTER
MIGUEL ANGELM Advising patient  to give me a call back in regards to being scheduled with  for follow up visit.  My name and office number was provided to receive a call back.

## 2024-01-04 ENCOUNTER — TELEPHONE (OUTPATIENT)
Dept: PULMONOLOGY | Facility: CLINIC | Age: 79
End: 2024-01-04
Payer: MEDICARE

## 2024-01-10 ENCOUNTER — HOSPITAL ENCOUNTER (EMERGENCY)
Facility: HOSPITAL | Age: 79
Discharge: HOME | End: 2024-01-10
Attending: EMERGENCY MEDICINE
Payer: MEDICARE

## 2024-01-10 VITALS
WEIGHT: 136 LBS | BODY MASS INDEX: 25.68 KG/M2 | DIASTOLIC BLOOD PRESSURE: 70 MMHG | TEMPERATURE: 98 F | RESPIRATION RATE: 18 BRPM | OXYGEN SATURATION: 95 % | HEART RATE: 83 BPM | SYSTOLIC BLOOD PRESSURE: 108 MMHG | HEIGHT: 61 IN

## 2024-01-10 DIAGNOSIS — J06.9 VIRAL URI WITH COUGH: Primary | ICD-10-CM

## 2024-01-10 DIAGNOSIS — R06.02 SOB (SHORTNESS OF BREATH): ICD-10-CM

## 2024-01-10 LAB
ALBUMIN SERPL BCP-MCNC: 3.1 G/DL (ref 3.5–5.2)
ALLENS TEST: NORMAL
ALP SERPL-CCNC: 72 U/L (ref 55–135)
ALT SERPL W/O P-5'-P-CCNC: 8 U/L (ref 10–44)
ANION GAP SERPL CALC-SCNC: 11 MMOL/L (ref 8–16)
AST SERPL-CCNC: 16 U/L (ref 10–40)
BASOPHILS # BLD AUTO: 0.04 K/UL (ref 0–0.2)
BASOPHILS NFR BLD: 0.6 % (ref 0–1.9)
BILIRUB SERPL-MCNC: 0.4 MG/DL (ref 0.1–1)
BILIRUB UR QL STRIP: NEGATIVE
BNP SERPL-MCNC: 320 PG/ML (ref 0–99)
BUN SERPL-MCNC: 13 MG/DL (ref 8–23)
CALCIUM SERPL-MCNC: 8.8 MG/DL (ref 8.7–10.5)
CHLORIDE SERPL-SCNC: 105 MMOL/L (ref 95–110)
CLARITY UR REFRACT.AUTO: CLEAR
CO2 SERPL-SCNC: 22 MMOL/L (ref 23–29)
COLOR UR AUTO: YELLOW
CREAT SERPL-MCNC: 0.7 MG/DL (ref 0.5–1.4)
DELSYS: NORMAL
DIFFERENTIAL METHOD BLD: ABNORMAL
EOSINOPHIL # BLD AUTO: 0.4 K/UL (ref 0–0.5)
EOSINOPHIL NFR BLD: 5.7 % (ref 0–8)
ERYTHROCYTE [DISTWIDTH] IN BLOOD BY AUTOMATED COUNT: 17.2 % (ref 11.5–14.5)
EST. GFR  (NO RACE VARIABLE): >60 ML/MIN/1.73 M^2
GLUCOSE SERPL-MCNC: 123 MG/DL (ref 70–110)
GLUCOSE UR QL STRIP: NEGATIVE
HCT VFR BLD AUTO: 34 % (ref 37–48.5)
HGB BLD-MCNC: 10.7 G/DL (ref 12–16)
HGB UR QL STRIP: NEGATIVE
IMM GRANULOCYTES # BLD AUTO: 0.03 K/UL (ref 0–0.04)
IMM GRANULOCYTES NFR BLD AUTO: 0.5 % (ref 0–0.5)
INFLUENZA A, MOLECULAR: NOT DETECTED
INFLUENZA B, MOLECULAR: NOT DETECTED
KETONES UR QL STRIP: NEGATIVE
LDH SERPL L TO P-CCNC: 0.62 MMOL/L (ref 0.5–2.2)
LEUKOCYTE ESTERASE UR QL STRIP: NEGATIVE
LYMPHOCYTES # BLD AUTO: 1.1 K/UL (ref 1–4.8)
LYMPHOCYTES NFR BLD: 17 % (ref 18–48)
MAGNESIUM SERPL-MCNC: 1.8 MG/DL (ref 1.6–2.6)
MCH RBC QN AUTO: 28.2 PG (ref 27–31)
MCHC RBC AUTO-ENTMCNC: 31.5 G/DL (ref 32–36)
MCV RBC AUTO: 90 FL (ref 82–98)
MONOCYTES # BLD AUTO: 0.6 K/UL (ref 0.3–1)
MONOCYTES NFR BLD: 9.5 % (ref 4–15)
NEUTROPHILS # BLD AUTO: 4.4 K/UL (ref 1.8–7.7)
NEUTROPHILS NFR BLD: 66.7 % (ref 38–73)
NITRITE UR QL STRIP: NEGATIVE
NRBC BLD-RTO: 0 /100 WBC
PH UR STRIP: 7 [PH] (ref 5–8)
PLATELET # BLD AUTO: 288 K/UL (ref 150–450)
PMV BLD AUTO: 11.2 FL (ref 9.2–12.9)
POTASSIUM SERPL-SCNC: 3.7 MMOL/L (ref 3.5–5.1)
PROCALCITONIN SERPL IA-MCNC: 0.02 NG/ML
PROT SERPL-MCNC: 6.3 G/DL (ref 6–8.4)
PROT UR QL STRIP: NEGATIVE
RBC # BLD AUTO: 3.79 M/UL (ref 4–5.4)
RSV AG BY MOLECULAR METHOD: NOT DETECTED
SAMPLE: NORMAL
SARS-COV-2 RNA RESP QL NAA+PROBE: NOT DETECTED
SITE: NORMAL
SODIUM SERPL-SCNC: 138 MMOL/L (ref 136–145)
SP GR UR STRIP: 1.01 (ref 1–1.03)
TROPONIN I SERPL DL<=0.01 NG/ML-MCNC: <0.006 NG/ML (ref 0–0.03)
URN SPEC COLLECT METH UR: NORMAL
WBC # BLD AUTO: 6.52 K/UL (ref 3.9–12.7)

## 2024-01-10 PROCEDURE — 85025 COMPLETE CBC W/AUTO DIFF WBC: CPT | Performed by: EMERGENCY MEDICINE

## 2024-01-10 PROCEDURE — 0241U SARS-COV2 (COVID) WITH FLU/RSV BY PCR: CPT | Performed by: EMERGENCY MEDICINE

## 2024-01-10 PROCEDURE — 93010 ELECTROCARDIOGRAM REPORT: CPT | Mod: ,,, | Performed by: INTERNAL MEDICINE

## 2024-01-10 PROCEDURE — 99284 EMERGENCY DEPT VISIT MOD MDM: CPT | Mod: 25

## 2024-01-10 PROCEDURE — 84484 ASSAY OF TROPONIN QUANT: CPT | Performed by: EMERGENCY MEDICINE

## 2024-01-10 PROCEDURE — 87040 BLOOD CULTURE FOR BACTERIA: CPT | Performed by: EMERGENCY MEDICINE

## 2024-01-10 PROCEDURE — 84145 PROCALCITONIN (PCT): CPT | Performed by: EMERGENCY MEDICINE

## 2024-01-10 PROCEDURE — 93005 ELECTROCARDIOGRAM TRACING: CPT

## 2024-01-10 PROCEDURE — 80053 COMPREHEN METABOLIC PANEL: CPT | Performed by: EMERGENCY MEDICINE

## 2024-01-10 PROCEDURE — 83735 ASSAY OF MAGNESIUM: CPT | Performed by: EMERGENCY MEDICINE

## 2024-01-10 PROCEDURE — 83880 ASSAY OF NATRIURETIC PEPTIDE: CPT | Performed by: EMERGENCY MEDICINE

## 2024-01-10 PROCEDURE — 81003 URINALYSIS AUTO W/O SCOPE: CPT | Performed by: EMERGENCY MEDICINE

## 2024-01-10 NOTE — ED TRIAGE NOTES
Pt arriving to ED c/o cough for a Few weeks, coughing up green sputum, was on ventilator nov for tongue swelling. Also c/o SOB from cough. Denies chest pain.

## 2024-01-10 NOTE — PROVIDER PROGRESS NOTES - EMERGENCY DEPT.
Encounter Date: 1/10/2024    ED Physician Progress Notes         EKG - STEMI Decision  Initial Reading: No STEMI present.  Response: No Action Needed.

## 2024-01-10 NOTE — ED NOTES
Patient identifiers verified and correct for Jocelyne Trusty   LOC: The patient is awake, alert and aware of environment with an appropriate affect, the patient is oriented x 3 and speaking appropriately.   APPEARANCE: Patient appears comfortable and in no acute distress, patient is clean and well groomed.  SKIN: The skin is warm and dry, color consistent with ethnicity, patient has normal skin turgor and moist mucus membranes, skin intact  MUSCULOSKELETAL: Patient moving all extremities spontaneously, no swelling noted.  RESPIRATORY: Airway is open and patent, respirations are spontaneous, patient has a normal effort and rate, no accessory muscle use noted, pt placed on continuous pulse ox with O2 sats noted at 94% on room air. Pt c/o productive cough with SOB  CARDIAC: Pt placed on cardiac monitor. Patient has a tachy rate, no edema noted, capillary refill < 3 seconds.   GASTRO: Soft and non tender to palpation, no distention noted. Pt states bowel movements have been regular.  : Pt denies any pain or frequency with urination.  NEURO: Pt opens eyes spontaneously, behavior appropriate to situation, follows commands, facial expression symmetrical, bilateral hand grasp equal and even, purposeful motor response noted, normal sensation in all extremities when touched with a finger.

## 2024-01-10 NOTE — ED PROVIDER NOTES
Emergency Department Encounter  Provider Note    Jocelyne Duncan  [id number]  1/10/2024    Evaluation:    History Acquisition:     Chief Complaint   Patient presents with    URI     Few weeks cough, coughing up green, was on ventilator nov for tongue swelling       History of Present Illness:  Jocelyne Duncan is a 78 y.o. female who has a past medical history of Arthritis, GERD (gastroesophageal reflux disease), HTN (hypertension), Premature atrial complexes, and Rheumatoid arthritis.    The patient presents to the ED due to cough, congestion, and sore throat.   Patient reports symptoms started in November after an admission to the hospital. She was on lisinopril which caused her tongue to swell, requiring intubation. She states since discharge from the hospital she has had a persistent cough. No associated fever. She states over the last few days the cough has gotten worse. She denies any fever, CP, N/V/D, abdominal pain, leg pain/swelling, or any other concerns.  She has history of asthma and follows with Pulmonology, but she missed her most recent appointment due to being admitted. She went to the clinic today for an appointment but they were unable to see her, so she presents to ED for evaluation.     Additional historians utilized:  none    Prior medical records were reviewed:   Admitted 11/26-12/5 for angioedema requiring intubation   GI visit 11/10 for f/u chronic diarrhea  Admitted 10/21-10/22 for oral and facial swelling    The patient's list of active medical problems, social history, medications, and allergies as documented has been reviewed.     Past Medical History:   Diagnosis Date    Arthritis     GERD (gastroesophageal reflux disease)     HTN (hypertension)     Premature atrial complexes     Rheumatoid arthritis      Past Surgical History:   Procedure Laterality Date    LUMBAR SPINE SURGERY       Family History   Problem Relation Age of Onset    Breast cancer Neg Hx     Colon cancer Neg Hx      Ovarian cancer Neg Hx      Social History     Socioeconomic History    Marital status:    Tobacco Use    Smoking status: Never    Smokeless tobacco: Never   Substance and Sexual Activity    Alcohol use: Yes     Comment: occ    Drug use: Never    Sexual activity: Not Currently     Partners: Male     Birth control/protection: Post-menopausal     Review of patient's allergies indicates:   Allergen Reactions    Iodine      Swelling (throat)^  Unsure of what drug/food triggered the reaction      Adhesive Itching    Penicillins        Review of Systems   Respiratory:  Positive for cough.          Physical Exam:     Initial Vitals [01/10/24 1426]   BP Pulse Resp Temp SpO2   (!) 141/89 (!) 115 (!) 24 98.2 °F (36.8 °C) (!) 94 %      MAP       --         Physical Exam    Nursing note and vitals reviewed.  Constitutional: She appears well-developed and well-nourished. She is not diaphoretic. No distress.   HENT:   Head: Normocephalic and atraumatic.   Mouth/Throat: Oropharynx is clear and moist.   Eyes: EOM are normal. Pupils are equal, round, and reactive to light.   Neck: No tracheal deviation present.   Cardiovascular:  Normal rate, regular rhythm, normal heart sounds and intact distal pulses.           Pulmonary/Chest: Effort normal. No stridor. No respiratory distress. She has no decreased breath sounds. She has wheezes (mild, scattered).   Abdominal: Abdomen is soft. Bowel sounds are normal. She exhibits no distension and no mass. There is no abdominal tenderness.   Musculoskeletal:         General: No edema. Normal range of motion.     Neurological: She is alert and oriented to person, place, and time. She has normal strength. No cranial nerve deficit or sensory deficit.   Skin: Skin is warm and dry. Capillary refill takes less than 2 seconds. No pallor.   Psychiatric: She has a normal mood and affect. Her behavior is normal. Thought content normal.         Differential Diagnoses:   Based on available information  and initial assessment, Differential Diagnosis includes, but is not limited to:  Sepsis, meningitis, cavernous sinus thrombosis, nasal foreign body, otitis media/external, nasal polyp, bacterial sinusitis, allergic rhinitis, influenza, bacterial/viral pharyngitis, peritonsillar abscess, retropharyngeal abscess, bacterial/viral pneumonia.      ED Management:   Procedures    Orders Placed This Encounter    Blood culture x two cultures. Draw prior to antibiotics.    X-Ray Chest AP Portable    CBC auto differential    Comprehensive metabolic panel    Urinalysis, Reflex to Urine Culture Urine, Clean Catch    Magnesium    Brain natriuretic peptide    Troponin I    Procalcitonin    SARS-Cov2 (COVID) with FLU/RSV by PCR    ED Preference List Used to Initiate Sepsis Orders    Vital signs    Cardiac Monitoring - Adult    Strict intake and output    Pulse Oximetry Continuous    POCT Venous Blood Gas (Lactate) #1    EKG 12-lead    Saline lock IV    Possible Hospitalization    ISTAT Lactate    Bed rest          EKG:   EKG interpretation by ED attending physician:  Sinus tach, rate 111, non-specific ST changes, no ST elevations or acute ischemia, normal intervals.  Compared with prior EKG dated 10/2023, grossly stable without significant change.      Labs:     Labs Reviewed   CBC W/ AUTO DIFFERENTIAL - Abnormal; Notable for the following components:       Result Value    RBC 3.79 (*)     Hemoglobin 10.7 (*)     Hematocrit 34.0 (*)     MCHC 31.5 (*)     RDW 17.2 (*)     Lymph % 17.0 (*)     All other components within normal limits   COMPREHENSIVE METABOLIC PANEL - Abnormal; Notable for the following components:    CO2 22 (*)     Glucose 123 (*)     Albumin 3.1 (*)     ALT 8 (*)     All other components within normal limits   B-TYPE NATRIURETIC PEPTIDE - Abnormal; Notable for the following components:     (*)     All other components within normal limits   CULTURE, BLOOD   CULTURE, BLOOD   URINALYSIS, REFLEX TO URINE  CULTURE    Narrative:     Specimen Source->Urine   MAGNESIUM   TROPONIN I   PROCALCITONIN   SARS-COV2 (COVID) WITH FLU/RSV BY PCR   ISTAT LACTATE     Independent review of the labs ordered include:   See ED course    Imaging:     Imaging Results              X-Ray Chest AP Portable (Final result)  Result time 01/10/24 17:53:30      Final result by Rc Grissom MD (01/10/24 17:53:30)                   Impression:      No detrimental change or radiographic acute intrathoracic process seen on this single view.      Electronically signed by: Rc Grissom MD  Date:    01/10/2024  Time:    17:53               Narrative:    EXAMINATION:  XR CHEST AP PORTABLE    CLINICAL HISTORY:  Sepsis;    TECHNIQUE:  Single frontal view of the chest was performed.    COMPARISON:  Chest radiograph 06/14/2022 and CT thorax 09/02/2022    FINDINGS:  No detrimental change.  Cardiomediastinal silhouette is midline and stable without evidence of failure.  Pulmonary vasculature and hilar contours are within normal limits.  Bibasilar minimal platelike scarring versus atelectasis.  Right lower lobe small calcified granuloma unchanged.  The lungs are otherwise well expanded without consolidation, pleural effusion or pneumothorax definitively seen.  No acute osseous process seen.  PA and lateral views can be obtained.                                         Medications Given:   Medications - No data to display     Medical Decision Making:    Additional Consideration:   Additional testing considered during clinical course: none    Social determinants of health considered during development of treatment plan include: none    Current co-morbidities considered which impacted clinical decision making: HTN, HLD, DM, GERD, IBS, RA, OA, asthma     Case discussed with additional provider: none    ED Course as of 01/10/24 1857   Wed Roberto 10, 2024   1623 SpO2(!): 94 % [SS]   1623 Resp(!): 24 [SS]   1623 Pulse(!): 115 [SS]   1623 Temp Source: Oral [SS]    1623 Temp: 98.2 °F (36.8 °C) [SS]   1623 BP(!): 141/89  Tachycardic, mildly hypoxic on arrival.  [SS]   1748 SpO2: 97 % [SS]   1748 Resp: 18 [SS]   1748 Pulse: 81 [SS]   1748 BP: 117/85  Vitals reassuring on reassessment [SS]   1748 ISTAT Lactate  Normal  [SS]   1748 Urinalysis, Reflex to Urine Culture Urine, Clean Catch  Normal  [SS]   1748 Procalcitonin  WNL [SS]   1748 Troponin I  WNL [SS]   1749 Brain natriuretic peptide(!)  Mildly elevated  [SS]   1749 Magnesium  WNL [SS]   1749 Comprehensive metabolic panel(!)  Unremarkable  [SS]   1749 CBC auto differential(!)  Unremarkable  [SS]   1818 SARS-Cov2 (COVID) with FLU/RSV by PCR  Viral swabs pending, but vitals stable and rest of workup unremarkable. Patient states symptoms have been ongoing since her admission to the hospital in November. COVID/flu infection will not  at this time given duration of symptoms. Will DC with supportive care and PCP/Pulmonology f/u.  [SS]      ED Course User Index  [SS] Terry Dominguez MD            Medical Decision Making  77 yo F with HTN, HLD, DM, GERD, IBS, RA, OA, asthma presents to ED with chronic cough ongoing since November after admission for angioedema.  Vitals reassuring, exam benign.  Labs unremarkable.  CXR clear.  Will DC with supportive care and OP f/u for likely viral URI, component of post-intubation cough. Recommend PCP/Pulmonology f/u as soon as able. Return precautions given.     Problems Addressed:  SOB (shortness of breath): complicated acute illness or injury with systemic symptoms  Viral URI with cough: complicated acute illness or injury with systemic symptoms    Amount and/or Complexity of Data Reviewed  External Data Reviewed: notes.  Labs: ordered. Decision-making details documented in ED Course.  Radiology: ordered and independent interpretation performed. Decision-making details documented in ED Course.  ECG/medicine tests: ordered and independent interpretation performed.  Decision-making details documented in ED Course.        Clinical Impression:       ICD-10-CM ICD-9-CM   1. Viral URI with cough  J06.9 465.9   2. SOB (shortness of breath)  R06.02 786.05         Follow-up Information       Follow up With Specialties Details Why Contact Info    Natali Barboaz FNP Family Medicine Schedule an appointment as soon as possible for a visit   [address] [place] [address]  [phone]               ED Disposition Condition    Discharge Stable              On re-evaluation, the patient's status has improved.  After complete ED evaluation, clinical impression is most consistent with cough.  PCP/Pulmonology follow-up as soon as possible was recommended.    After taking into careful account the patient's history, physical exam findings, as well as empirical and objective data obtained throughout ED workup, I feel no emergent medical condition has been identified. No further evaluation or admission was felt to be required, and the patient is stable for discharge from the ED. The patient and any additional family present were updated with test results, overall clinical impression, and recommended further plan of care, including discharge instructions as provided and outpatient follow-up for continued evaluation and management as needed. All questions were answered. The patient expressed understanding and agreed with current plan for discharge and follow-up plan of care. Strict ED return precautions were provided, including return/worsening of current symptoms, new symptoms, or any other concerns.       Terry Dominguez MD  01/11/24 5981

## 2024-01-11 NOTE — DISCHARGE INSTRUCTIONS
Thank you for choosing [facility]!     Our goal in the Emergency Department is to always provide outstanding medical care. You may receive a survey by mail or e-mail in the next week regarding your experience today. We would greatly appreciate you completing and returning the survey. Your feedback provides us with a way to recognize our staff who provide very good care, and it helps us learn how to improve when your experience was below our aspiration of excellence.      It is important to remember that some problems are difficult to diagnose and may not be found during your first visit. Be sure to follow up with your primary care doctor and review any labs/imaging that was performed during your visit with them. If you do not have a primary care doctor, you may contact the one listed on your discharge paperwork, or you may also call the [facility] Appointment Desk at [phone] to schedule an appointment.     All medications may potentially have side effects and it is impossible to predict which medications may give you side effects. If you feel that you are having a negative effect of any medication you should immediately stop taking them and seek medical attention.  Do not drive or make any important decisions for 24 hours if you have received any pain medications, sedatives or mood altering drugs during your ER visit.    We appreciate you trusting us with your medical care. We will be happy to take care of you for all of your future medical needs. You may return to the ER at any time for any new/concerning symptoms, worsening condition, or failure to improve. We hope you feel better soon.     Sincerely,    Terry Dominguez Jr., MD  Board-Certified Emergency Medicine Physician  [facility]

## 2024-01-15 LAB
BACTERIA BLD CULT: NORMAL
BACTERIA BLD CULT: NORMAL

## 2024-01-22 PROBLEM — N17.9 AKI (ACUTE KIDNEY INJURY): Status: RESOLVED | Noted: 2023-10-21 | Resolved: 2024-01-22

## 2024-01-24 ENCOUNTER — OFFICE VISIT (OUTPATIENT)
Dept: PULMONOLOGY | Facility: CLINIC | Age: 79
End: 2024-01-24
Payer: MEDICARE

## 2024-01-24 ENCOUNTER — OFFICE VISIT (OUTPATIENT)
Dept: ALLERGY | Facility: CLINIC | Age: 79
End: 2024-01-24
Payer: MEDICARE

## 2024-01-24 VITALS
DIASTOLIC BLOOD PRESSURE: 78 MMHG | SYSTOLIC BLOOD PRESSURE: 140 MMHG | WEIGHT: 135.38 LBS | BODY MASS INDEX: 26.58 KG/M2 | HEART RATE: 44 BPM | HEIGHT: 60 IN | OXYGEN SATURATION: 94 %

## 2024-01-24 DIAGNOSIS — T78.3XXD ANGIOEDEMA, SUBSEQUENT ENCOUNTER: Primary | ICD-10-CM

## 2024-01-24 DIAGNOSIS — R06.02 SHORTNESS OF BREATH: ICD-10-CM

## 2024-01-24 DIAGNOSIS — J84.9 INTERSTITIAL PULMONARY DISEASE, UNSPECIFIED: ICD-10-CM

## 2024-01-24 DIAGNOSIS — K21.9 GASTROESOPHAGEAL REFLUX DISEASE WITHOUT ESOPHAGITIS: ICD-10-CM

## 2024-01-24 DIAGNOSIS — T78.3XXA ANGIOEDEMA, INITIAL ENCOUNTER: Primary | ICD-10-CM

## 2024-01-24 DIAGNOSIS — I27.20 PULMONARY HYPERTENSION: ICD-10-CM

## 2024-01-24 DIAGNOSIS — J98.4 RESTRICTIVE LUNG DISEASE: ICD-10-CM

## 2024-01-24 PROCEDURE — 99204 OFFICE O/P NEW MOD 45 MIN: CPT | Mod: S$GLB,,, | Performed by: ALLERGY & IMMUNOLOGY

## 2024-01-24 PROCEDURE — 99213 OFFICE O/P EST LOW 20 MIN: CPT | Mod: S$GLB,,, | Performed by: INTERNAL MEDICINE

## 2024-01-24 PROCEDURE — 99999 PR PBB SHADOW E&M-EST. PATIENT-LVL III: CPT | Mod: PBBFAC,,, | Performed by: INTERNAL MEDICINE

## 2024-01-24 RX ORDER — ALBUTEROL SULFATE 90 UG/1
2 AEROSOL, METERED RESPIRATORY (INHALATION) EVERY 6 HOURS PRN
Qty: 18 G | Refills: 11 | Status: SHIPPED | OUTPATIENT
Start: 2024-01-24

## 2024-01-24 RX ORDER — PREDNISONE 20 MG/1
40 TABLET ORAL DAILY
Qty: 10 TABLET | Refills: 0 | Status: SHIPPED | OUTPATIENT
Start: 2024-01-24 | End: 2024-01-26 | Stop reason: SDUPTHER

## 2024-01-24 RX ORDER — GUAIFENESIN 600 MG/1
1200 TABLET, EXTENDED RELEASE ORAL 2 TIMES DAILY
Qty: 120 TABLET | Refills: 11 | Status: SHIPPED | OUTPATIENT
Start: 2024-01-24 | End: 2024-01-26 | Stop reason: SDUPTHER

## 2024-01-24 NOTE — PROGRESS NOTES
Subjective:     Reason for visit: shortness of breath     Patient ID:  Jocelyne Duncan is a 78 y.o. female    Initial History:  78 yo with RA and previously told she had asthma that presents for evaluation of her shortness of breath. Her symptoms have been present for some time and she was recently started on breo which seems to help a lot. She has issues with exertion that cause shortness of breath but has also started noticing some shortness of breath at rest. IT has improved since starting the breo but is still present. She also notes a strange tightness in her chest that is intermittent and typically gets better without any interventions.     Interval History 10/19/22:   Ms. Casanova is doing ok and feels that the breo is definitely helping her asthma symptoms. She does still have occasional episodes of difficulty catching her breath when laying on her left side at times. She does overall feel better than she had before. She does have a new cough for the last few days that started with the weather change.     History 1/24/24:   Ms. Duncan has had a hard time getting in to see us and has been hospitalized twice in the last 6 months. She was hospitalized in November at [facility] with what sounds like angioedema and required intubation for 3 days and since then her throat has been significantly sore and her shortness of breath has been much worse. She was also seen recently with significant shortness of breath and her  is also with her and worried that no one will help her now that she is older.     Objective:     Vitals:    01/24/24 1417   BP: (!) 140/78   Pulse: (!) 44   SpO2: (!) 94%   Weight: 61.4 kg (135 lb 5.8 oz)   Height: 5' (1.524 m)         Physical Exam  Vitals and nursing note reviewed.   Constitutional:       General: She is not in acute distress.     Appearance: She is not diaphoretic.   HENT:      Head: Normocephalic and atraumatic.      Right Ear: External ear normal.      Left Ear: External ear  normal.   Eyes:      Conjunctiva/sclera: Conjunctivae normal.      Pupils: Pupils are equal, round, and reactive to light.   Neck:      Trachea: No tracheal deviation.   Cardiovascular:      Rate and Rhythm: Normal rate and regular rhythm.      Heart sounds: Normal heart sounds. No murmur heard.  Pulmonary:      Effort: Pulmonary effort is normal. No respiratory distress.      Breath sounds: No stridor. Rhonchi and rales present. No wheezing.      Comments: Conversational dyspnea   Abdominal:      General: Bowel sounds are normal. There is no distension.      Palpations: Abdomen is soft.      Tenderness: There is no abdominal tenderness.   Musculoskeletal:         General: Deformity (arthritis of both hands ) present. Normal range of motion.      Cervical back: Normal range of motion and neck supple.   Skin:     General: Skin is warm and dry.      Findings: No erythema.   Neurological:      Mental Status: She is alert and oriented to person, place, and time.      Gait: Gait is intact.   Psychiatric:         Mood and Affect: Mood and affect normal.         Cognition and Memory: Memory normal.         Judgment: Judgment normal.          Discussed CT and echo findings with patient and her .       Results for orders placed during the hospital encounter of 09/02/22    Echo    Interpretation Summary  · The left ventricle is normal in size with normal systolic function.  · The estimated ejection fraction is 60%.  · Moderate left atrial enlargement.  · Left ventricular diastolic dysfunction.  · Normal right ventricular size with normal right ventricular systolic function.  · Mild tricuspid regurgitation.  · Normal central venous pressure (3 mmHg).  · The estimated PA systolic pressure is 32 mmHg.  · Trivial pericardial effusion. Under the atria.      Assessment:     1. Angioedema, initial encounter  Ambulatory referral/consult to Allergy      2. Shortness of breath  Ambulatory referral/consult to Allergy    albuterol  (PROVENTIL/VENTOLIN HFA) 90 mcg/actuation inhaler    Stress test, pulmonary    Ambulatory referral/consult to ENT    predniSONE (DELTASONE) 20 MG tablet      3. Interstitial pulmonary disease, unspecified  CT Chest Without Contrast    predniSONE (DELTASONE) 20 MG tablet      4. Gastroesophageal reflux disease without esophagitis        5. Pulmonary hypertension        6. Restrictive lung disease            Current Outpatient Medications   Medication Instructions    acetaminophen (TYLENOL) 325 MG tablet Take 1 tablet by mouth every morning and 1 to 2 tablets every night at bedtime.    albuterol (PROVENTIL/VENTOLIN HFA) 90 mcg/actuation inhaler 2 puffs, Inhalation, Every 6 hours PRN    amLODIPine (NORVASC) 5 mg, Oral, Daily    atorvastatin (LIPITOR) 20 mg, Oral, Daily    BREO ELLIPTA 200-25 mcg/dose DsDv diskus inhaler 1 puff, Inhalation, Daily    calcium carbonate/vitamin D3 (VITAMIN D-3 ORAL) 1 capsule, Oral, Daily    diclofenac sodium (VOLTAREN) 1 % Gel Apply 2 g topically 1 to 3 times daily as needed for pain.    esomeprazole (NEXIUM) 40 mg, Oral, Daily    fluticasone propionate (FLONASE) 50 mcg, Each Nostril, 2 times daily PRN    furosemide (LASIX) 20 mg, Oral, Every other day    gabapentin (NEURONTIN) 100 mg, Oral, 3 times daily    guaiFENesin (MUCINEX) 1,200 mg, Oral, 2 times daily    leflunomide (ARAVA) 20 mg, Oral, Daily    lisinopril-hydrochlorothiazide (PRINZIDE,ZESTORETIC) 20-12.5 mg per tablet 1 tablet, Oral, Daily    methotrexate 2.5 MG Tab Every 7 days    nystatin (MYCOSTATIN) cream Topical (Top), As needed (PRN)    predniSONE (DELTASONE) 2 mg, Oral, Every morning    predniSONE (DELTASONE) 40 mg, Oral, Daily      Jocelyne IKarrie Duncan had no medications administered during this visit.     Orders Placed This Encounter   Procedures    CT Chest Without Contrast     Standing Status:   Future     Standing Expiration Date:   1/24/2025     Order Specific Question:   May the Radiologist modify the order per protocol to  meet the clinical needs of the patient?     Answer:   Yes    Ambulatory referral/consult to Allergy     Standing Status:   Future     Number of Occurrences:   1     Standing Expiration Date:   2/24/2025     Referral Priority:   Urgent     Referral Type:   Allergy Testing     Referral Reason:   Specialty Services Required     Requested Specialty:   Allergy     Number of Visits Requested:   1    Ambulatory referral/consult to ENT     Standing Status:   Future     Standing Expiration Date:   2/24/2025     Referral Priority:   Urgent     Referral Type:   Consultation     Referral Reason:   Specialty Services Required     Requested Specialty:   Otolaryngology     Number of Visits Requested:   1    Stress test, pulmonary     Standing Status:   Future     Standing Expiration Date:   1/24/2025     Order Specific Question:   Reason for study     Answer:   Oxygen prescription     Order Specific Question:   Release to patient     Answer:   Immediate        Plan:       Problem List Items Addressed This Visit          Pulmonary    Shortness of breath    Relevant Medications    albuterol (PROVENTIL/VENTOLIN HFA) 90 mcg/actuation inhaler    predniSONE (DELTASONE) 20 MG tablet    Other Relevant Orders    Ambulatory referral/consult to Allergy    Stress test, pulmonary    Ambulatory referral/consult to ENT    Restrictive lung disease    Current Assessment & Plan     Much worsening shortness of breath over the last several months  - is frustrated because she hasn't been able to tell what is wrong  -repeat CT scan now, short course of steroids (40 mg)  - continue breathing treatments   - continue RA treatment               Cardiac/Vascular    Pulmonary hypertension    Overview     Noted on echo in 2022- follows with cardiology             GI    GERD (gastroesophageal reflux disease)    Current Assessment & Plan     Continue medication and precautions           Other Visit Diagnoses       Angioedema, initial encounter    -  Primary     Relevant Orders    Ambulatory referral/consult to Allergy    Interstitial pulmonary disease, unspecified        Relevant Medications    predniSONE (DELTASONE) 20 MG tablet    Other Relevant Orders    CT Chest Without Contrast           Emily Coffman M.D.  Pulmonary/Critical Care

## 2024-01-24 NOTE — ASSESSMENT & PLAN NOTE
Much worsening shortness of breath over the last several months  - is frustrated because she hasn't been able to tell what is wrong  -repeat CT scan now, short course of steroids (40 mg)  - continue breathing treatments   - continue RA treatment

## 2024-01-24 NOTE — PROGRESS NOTES
ALLERGY & IMMUNOLOGY CLINIC   HISTORY OF PRESENT ILLNESS   Referral from: No ref. provider found  CC: No chief complaint on file.    HPI: Jocelyne Duncan is a 78 y.o. female here for angioedema episode here with her .    24 was eating a Ricola cough drop and tongue started to swell relief with benadryl    Admitted - for angioedema requiring intubation   Was drinking GI prep (got through half a gallon)  Then tongue started to swell and couldn't breathe  Took 2 benadryl without relief, instead it was getting worse  [employer]ber to [place]  Admitted for 2 weeks  On ventilator for 3 days    In 2024 left side of face swelled  Relief with benadryl    Separately had IV contrast at [facility] and tongue swelled (similar symptoms as recent episode)     Top of eyes are also swollen    Also took 2 antibiotics for eye swelling, first one made her tongue swell, tried the second one and same thing happened relief with benadryl with [place]    Got c diff during hospitalization had 2 months of diarrhea after, relief with green plantain    Was on lisinopril, now she is off since [place] 2024    Mom and sister  of leukemia    Angioedema location: tongue, face, maybe eyes  Urticaria: no  Anaphylaxis: no but hard to breathe from swelling  Duration: Usually relief with benadryl except with 2023  Frequency: 4 times  Age of onset: Years ago at [facility], then last year recurred 3 times  Hormone therapy: no  Association with puberty/menses/pregnancy: no  Association with facial paralysis: No  Taking ACEi: was on lisinopril  Known hematological/lymphoproliferative disorders:   Known autoimmune disorders: rheumatoid arthritis  Family history angioedema: no  Worse with NSAIDs, alcohol, stress: No, maybe stress  Response to antihistamines, steroids, epi, prior C1 esterase inhibitor replacement:        Drug Allergies:   Review of patient's allergies indicates:   Allergen Reactions    Iodine      Swelling  (throat)^  Unsure of what drug/food triggered the reaction      Adhesive Itching    Penicillins          MEDICAL HISTORY   MedHx:   Patient Active Problem List   Diagnosis    Rheumatoid arthritis    Essential hypertension    GERD (gastroesophageal reflux disease)    Premature atrial complexes    Shortness of breath    Cellulitis of right lower extremity    Other hyperlipidemia    Immunosuppressed status    Infectious diarrhea    Metabolic acidosis    Pulmonary hypertension    Restrictive lung disease       SurgHx:  Past Surgical History:   Procedure Laterality Date    LUMBAR SPINE SURGERY         Medications:   Current Outpatient Medications on File Prior to Visit   Medication Sig Dispense Refill    acetaminophen (TYLENOL) 325 MG tablet Take 1 tablet by mouth every morning and 1 to 2 tablets every night at bedtime.      albuterol (PROVENTIL/VENTOLIN HFA) 90 mcg/actuation inhaler Inhale 2 puffs into the lungs every 6 (six) hours as needed for Wheezing or Shortness of Breath. 18 g 11    amLODIPine (NORVASC) 5 MG tablet Take 5 mg by mouth once daily.       aspirin (ECOTRIN) 81 MG EC tablet Take 1 tablet by mouth once daily.      atorvastatin (LIPITOR) 20 MG tablet Take 20 mg by mouth once daily.       BREO ELLIPTA 200-25 mcg/dose DsDv diskus inhaler Inhale 1 puff into the lungs once daily.      calcium carbonate/vitamin D3 (VITAMIN D-3 ORAL) Take 1 capsule by mouth once daily.      diclofenac sodium (VOLTAREN) 1 % Gel Apply 2 g topically 1 to 3 times daily as needed for pain.      esomeprazole (NEXIUM) 40 MG capsule Take 40 mg by mouth once daily.      fluticasone propionate (FLONASE) 50 mcg/actuation nasal spray 1 spray (50 mcg total) by Each Nostril route 2 (two) times daily as needed for Rhinitis. 15 g 0    furosemide (LASIX) 20 MG tablet Take 20 mg by mouth every other day.      gabapentin (NEURONTIN) 100 MG capsule Take 100 mg by mouth 3 (three) times daily.      guaiFENesin (MUCINEX) 600 mg 12 hr tablet Take 2  tablets (1,200 mg total) by mouth 2 (two) times daily. 120 tablet 11    leflunomide (ARAVA) 20 MG Tab Take 20 mg by mouth once daily.      lisinopril-hydrochlorothiazide (PRINZIDE,ZESTORETIC) 20-12.5 mg per tablet Take 1 tablet by mouth once daily.       meloxicam (MOBIC) 15 MG tablet Take 15 mg by mouth once daily.      methotrexate 2.5 MG Tab every 7 days.       nystatin (MYCOSTATIN) cream Apply topically as needed.      predniSONE (DELTASONE) 1 MG tablet Take 2 mg by mouth every morning.      predniSONE (DELTASONE) 20 MG tablet Take 2 tablets (40 mg total) by mouth once daily. for 5 days 10 tablet 0    [DISCONTINUED] albuterol (PROVENTIL/VENTOLIN HFA) 90 mcg/actuation inhaler Inhale 2 puffs into the lungs every 6 (six) hours as needed for Wheezing or Shortness of Breath.      [DISCONTINUED] SITagliptin (JANUVIA) 50 MG Tab Take by mouth once daily.       No current facility-administered medications on file prior to visit.      PHYSICAL EXAM   VS: LMP  (LMP Unknown)   GENERAL: NAD, well nourished, well appearing  EYES: no conjunctival injection, no discharge, no infraorbital shiners  EARS: external auditory canals normal B/L  ORAL: MMM, no ulcers, no thrush, no cobblestoning  LUNGS:  no increased WOB  DERM: no rashes   ASSESSMENT & PLAN     Jocelyne Duncan is a 78 y.o. female with     Angioedema 3x, once requiring intubation  - Could be histamine mediated given response to benadryl in the past  - Try 2 tabs twice a day of antihistamines if this recurs at onset, these work fast in 30 minutes  - Given severity of angioedema requiring intubation for 3 days this could represent acquired angioedema, which can be seen with cancers (anemic, has abnormalities on breast imaging with follow-up scheduled, pending first colonoscopy, in setting of weight loss)  - This could also be hereditary angioedema, for which treatment would differ (C1 inhibitor replacement)  - Angioedema labs ordered (C4, C1 panel and function, C1q,  tryptase for baseline)  - This is not consistent with anaphylaxis  - We discussed whether golytely could have caused this, but she only was taking this during her last episode. Not during the 3 prior to that. Therefore I encourage her to undergo colonoscopy  - Avoid lisinopril, in an abundance of caution (stopped 12/2024, can take 3 months to clear system, avoid all ACEi but ok to use ARBs)    Anemia with weight loss  - Concerning for malignancy, discussed today, both mom and sister passed away from leukemia and 's mom passed away from breast cancer  - Has breast follow-up scheduled  - Has colonoscopy planned    Contrast allergy  - Angioedema during this that resolved with benadryl  - Consider premedication with antihistamines/prednisone in future if needed again vs. Skin testing, however given episodic spontaneous angioedema will start evaluation with that     Penicillin allergy  - Not discussed today, consider penicillin challenge in clinic in future if would like, based on history    Follow up: pending labs

## 2024-01-26 ENCOUNTER — TELEPHONE (OUTPATIENT)
Dept: ALLERGY | Facility: CLINIC | Age: 79
End: 2024-01-26
Payer: MEDICARE

## 2024-01-26 DIAGNOSIS — R06.02 SHORTNESS OF BREATH: ICD-10-CM

## 2024-01-26 DIAGNOSIS — J84.9 INTERSTITIAL PULMONARY DISEASE, UNSPECIFIED: ICD-10-CM

## 2024-01-26 RX ORDER — GUAIFENESIN 600 MG/1
1200 TABLET, EXTENDED RELEASE ORAL 2 TIMES DAILY
Qty: 120 TABLET | Refills: 11 | Status: SHIPPED | OUTPATIENT
Start: 2024-01-26 | End: 2025-01-25

## 2024-01-26 RX ORDER — PREDNISONE 20 MG/1
40 TABLET ORAL DAILY
Qty: 10 TABLET | Refills: 0 | Status: SHIPPED | OUTPATIENT
Start: 2024-01-26 | End: 2024-01-31

## 2024-01-26 NOTE — TELEPHONE ENCOUNTER
Called pt in regards to below message an appt was scheduled for 3/5/24.    Can we make her a lab appointment a week before my appointment with her on 2/19?   Any time of day, nonfasting.   She will need to be called as she doesn't use the portal.

## 2024-01-26 NOTE — TELEPHONE ENCOUNTER
----- Message from Michelle Balderrama sent at 1/26/2024 11:38 AM CST -----  Regarding: change location  Contact: @  [phone]  Pt is calling to change the location of his/her medication to the following location .......please call and adv @  [phone] predniSONE (DELTASONE) 20 MG tablet, guaiFENesin (MUCINEX) 600 mg 12 hr tablet,       [id number] [address] AT [address]  [address]  [facility] [place] [address]  Phone: [phone] Fax: [phone]

## 2024-01-26 NOTE — TELEPHONE ENCOUNTER
----- Message from Amaris Galvez MD sent at 1/26/2024  8:07 AM CST -----  Regarding: Lab appt needed  Can we make her a lab appointment a week before my appointment with her on 2/19?   Any time of day, nonfasting.  She will need to be called as she doesn't use the portal.    [facility]

## 2024-01-29 ENCOUNTER — TELEPHONE (OUTPATIENT)
Dept: PULMONOLOGY | Facility: CLINIC | Age: 79
End: 2024-01-29
Payer: MEDICARE

## 2024-01-29 NOTE — TELEPHONE ENCOUNTER
----- Message from Nasima Chan sent at 1/29/2024  2:38 PM CST -----  Regarding: Prescription Transfer  Contact: [phone]  Calling to request prescriptions transfer to [facility] pharmacy due to medication not being filled at main campus before patient left. Please call to confirm transfer as soon as possible with patient..    [facility] #[id number] [address] AT [address]  [address] [place], [place] [address]  [facility] [address]  Phone: [phone] Fax: [phone]

## 2024-01-29 NOTE — TELEPHONE ENCOUNTER
Spoke with pt, informed her that I have received her message. I advised pt that her medication (Prednisone) (Albuterol Inhaler) and (Mucinex) is at [facility] on [address] and [facility]. Pt verbalized that she understand.

## 2024-02-19 ENCOUNTER — LAB VISIT (OUTPATIENT)
Dept: LAB | Facility: HOSPITAL | Age: 79
End: 2024-02-19
Payer: MEDICARE

## 2024-02-19 ENCOUNTER — TELEPHONE (OUTPATIENT)
Dept: ALLERGY | Facility: CLINIC | Age: 79
End: 2024-02-19
Payer: MEDICARE

## 2024-02-19 DIAGNOSIS — T78.3XXD ANGIOEDEMA, SUBSEQUENT ENCOUNTER: ICD-10-CM

## 2024-02-19 LAB — C4 SERPL-MCNC: 24 MG/DL (ref 11–44)

## 2024-02-19 PROCEDURE — 86161 COMPLEMENT/FUNCTION ACTIVITY: CPT | Performed by: STUDENT IN AN ORGANIZED HEALTH CARE EDUCATION/TRAINING PROGRAM

## 2024-02-19 PROCEDURE — 83520 IMMUNOASSAY QUANT NOS NONAB: CPT | Performed by: STUDENT IN AN ORGANIZED HEALTH CARE EDUCATION/TRAINING PROGRAM

## 2024-02-19 PROCEDURE — 86160 COMPLEMENT ANTIGEN: CPT | Mod: 59 | Performed by: STUDENT IN AN ORGANIZED HEALTH CARE EDUCATION/TRAINING PROGRAM

## 2024-02-19 PROCEDURE — 86160 COMPLEMENT ANTIGEN: CPT | Performed by: STUDENT IN AN ORGANIZED HEALTH CARE EDUCATION/TRAINING PROGRAM

## 2024-02-19 PROCEDURE — 86332 IMMUNE COMPLEX ASSAY: CPT | Performed by: STUDENT IN AN ORGANIZED HEALTH CARE EDUCATION/TRAINING PROGRAM

## 2024-02-19 PROCEDURE — 36415 COLL VENOUS BLD VENIPUNCTURE: CPT | Performed by: STUDENT IN AN ORGANIZED HEALTH CARE EDUCATION/TRAINING PROGRAM

## 2024-02-20 LAB — TRYPTASE LEVEL: 4 NG/ML

## 2024-02-24 LAB — C1INH SERPL-MCNC: 28 MG/DL (ref 21–39)

## 2024-02-27 LAB — C1INH FUNCTIONAL/C1INH TOTAL MFR SERPL: >100 %

## 2024-03-01 LAB — IC SERPL C1Q BIND-ACNC: 3.5 UG EQ/ML (ref 0–3.9)

## 2024-03-05 ENCOUNTER — HOSPITAL ENCOUNTER (OUTPATIENT)
Dept: PULMONOLOGY | Facility: CLINIC | Age: 79
Discharge: HOME | End: 2024-03-05
Payer: MEDICARE

## 2024-03-05 VITALS — HEIGHT: 60 IN | WEIGHT: 134.94 LBS | BODY MASS INDEX: 26.49 KG/M2

## 2024-03-05 DIAGNOSIS — R06.02 SHORTNESS OF BREATH: ICD-10-CM

## 2024-03-05 PROCEDURE — 94618 PULMONARY STRESS TESTING: CPT | Mod: S$GLB,,, | Performed by: INTERNAL MEDICINE

## 2024-03-05 NOTE — PROCEDURES
Jocelyne Duncan is a 78 y.o.  female patient, who presents for a 6 minute walk test ordered by MD Augustus.  The diagnosis is Pulmonary Hypertension; Shortness of Breath.  The patient's BMI is 26.4 kg/m2.  Predicted distance (lower limit of normal) is 264.35 meters.      Test Results:    The test was completed without stopping. The total time walked was 360 seconds. During walking, the patient reported:  Dyspnea, Leg pain. The patient used a wheelchair for assistance during testing.     03/05/2024---------Distance: 274.32 meters (900 feet)     O2 Sat % Supplemental Oxygen Heart Rate Blood Pressure Abner Scale   Pre-exercise  (Resting) 96 % Room Air 69 bpm 150/77 mmHg 0   During Exercise 91 % Room Air 90 bpm 145/77 mmHg 4   Post-exercise  (Recovery) 95 % Room Air  78 bpm       Recovery Time: 105 seconds    Performing nurse/tech: Erma CRUZ      PREVIOUS STUDY:   The patient has not had a previous study.      CLINICAL INTERPRETATION:  Six minute walk distance is 274.32 meters (900 feet) with somewhat heavy dyspnea.  During exercise, there was significant desaturation while breathing room air.  Blood pressure remained stable and Heart rate increased significantly with walking.  Hypertension was present prior to exercise.  The patient reported non-pulmonary symptoms during exercise.  Significant exercise impairment is likely due to subjective symptoms.  The patient did complete the study, walking 360 seconds of the 360 second test.  No previous study performed.  Based upon age and body mass index, exercise capacity may be normal.

## 2024-03-21 ENCOUNTER — HOSPITAL ENCOUNTER (OUTPATIENT)
Dept: RADIOLOGY | Facility: HOSPITAL | Age: 79
Discharge: HOME | End: 2024-03-21
Attending: INTERNAL MEDICINE
Payer: MEDICARE

## 2024-03-21 ENCOUNTER — OFFICE VISIT (OUTPATIENT)
Dept: OTOLARYNGOLOGY | Facility: CLINIC | Age: 79
End: 2024-03-21
Payer: MEDICARE

## 2024-03-21 ENCOUNTER — OFFICE VISIT (OUTPATIENT)
Dept: PULMONOLOGY | Facility: CLINIC | Age: 79
End: 2024-03-21
Payer: MEDICARE

## 2024-03-21 ENCOUNTER — OFFICE VISIT (OUTPATIENT)
Dept: ALLERGY | Facility: CLINIC | Age: 79
End: 2024-03-21
Payer: MEDICARE

## 2024-03-21 VITALS — HEART RATE: 73 BPM | DIASTOLIC BLOOD PRESSURE: 81 MMHG | SYSTOLIC BLOOD PRESSURE: 153 MMHG

## 2024-03-21 VITALS
BODY MASS INDEX: 25.64 KG/M2 | WEIGHT: 135.81 LBS | OXYGEN SATURATION: 92 % | BODY MASS INDEX: 24.99 KG/M2 | DIASTOLIC BLOOD PRESSURE: 76 MMHG | HEIGHT: 62 IN | HEIGHT: 61 IN | SYSTOLIC BLOOD PRESSURE: 136 MMHG | HEART RATE: 74 BPM | WEIGHT: 135.81 LBS

## 2024-03-21 DIAGNOSIS — R06.02 SHORTNESS OF BREATH: ICD-10-CM

## 2024-03-21 DIAGNOSIS — I27.20 PULMONARY HYPERTENSION: Primary | ICD-10-CM

## 2024-03-21 DIAGNOSIS — K21.9 GASTROESOPHAGEAL REFLUX DISEASE WITHOUT ESOPHAGITIS: ICD-10-CM

## 2024-03-21 DIAGNOSIS — J98.4 RESTRICTIVE LUNG DISEASE: ICD-10-CM

## 2024-03-21 DIAGNOSIS — J30.1 SEASONAL ALLERGIC RHINITIS DUE TO POLLEN: ICD-10-CM

## 2024-03-21 DIAGNOSIS — T78.3XXD ANGIOEDEMA, SUBSEQUENT ENCOUNTER: Primary | ICD-10-CM

## 2024-03-21 DIAGNOSIS — J84.9 INTERSTITIAL PULMONARY DISEASE, UNSPECIFIED: ICD-10-CM

## 2024-03-21 DIAGNOSIS — J31.0 CHRONIC RHINITIS: ICD-10-CM

## 2024-03-21 PROCEDURE — 99203 OFFICE O/P NEW LOW 30 MIN: CPT | Mod: 25,S$GLB,, | Performed by: OTOLARYNGOLOGY

## 2024-03-21 PROCEDURE — 71250 CT THORAX DX C-: CPT | Mod: 26,,, | Performed by: STUDENT IN AN ORGANIZED HEALTH CARE EDUCATION/TRAINING PROGRAM

## 2024-03-21 PROCEDURE — 99999 PR PBB SHADOW E&M-EST. PATIENT-LVL III: CPT | Mod: PBBFAC,,, | Performed by: INTERNAL MEDICINE

## 2024-03-21 PROCEDURE — 31575 DIAGNOSTIC LARYNGOSCOPY: CPT | Mod: S$GLB,,, | Performed by: OTOLARYNGOLOGY

## 2024-03-21 PROCEDURE — 71250 CT THORAX DX C-: CPT | Mod: TC

## 2024-03-21 PROCEDURE — 99213 OFFICE O/P EST LOW 20 MIN: CPT | Mod: S$GLB,,, | Performed by: INTERNAL MEDICINE

## 2024-03-21 PROCEDURE — 99999 PR PBB SHADOW E&M-EST. PATIENT-LVL III: CPT | Mod: PBBFAC,,, | Performed by: STUDENT IN AN ORGANIZED HEALTH CARE EDUCATION/TRAINING PROGRAM

## 2024-03-21 PROCEDURE — 99999 PR PBB SHADOW E&M-EST. PATIENT-LVL III: CPT | Mod: PBBFAC,,, | Performed by: OTOLARYNGOLOGY

## 2024-03-21 PROCEDURE — 99214 OFFICE O/P EST MOD 30 MIN: CPT | Mod: S$GLB,,, | Performed by: STUDENT IN AN ORGANIZED HEALTH CARE EDUCATION/TRAINING PROGRAM

## 2024-03-21 RX ORDER — LEVOCETIRIZINE DIHYDROCHLORIDE 5 MG/1
5 TABLET, FILM COATED ORAL NIGHTLY
Qty: 30 TABLET | Refills: 11 | Status: SHIPPED | OUTPATIENT
Start: 2024-03-21 | End: 2025-03-21

## 2024-03-21 RX ORDER — EPINEPHRINE 0.3 MG/.3ML
1 INJECTION SUBCUTANEOUS ONCE
Qty: 0.3 ML | Refills: 1 | Status: SHIPPED | OUTPATIENT
Start: 2024-03-21 | End: 2024-03-21

## 2024-03-21 NOTE — PROGRESS NOTES
Chief complaint:  Hoarseness and shortness of breath status post intubation    HPI   78 y.o. female presents for evaluation of hoarseness and shortness of breath that initially began after intubation several months ago.  No other complaints.  She reports her hoarseness is variable.  She does have underlying lung disease.  No pain.    Review of Systems   Constitutional: Negative for fatigue and unexpected weight change.   HENT: Per HPI.  Eyes: Negative for visual disturbance.   Respiratory: Negative for shortness of breath, hemoptysis   Cardiovascular: Negative for chest pain and palpitations.   Musculoskeletal: Negative for decreased ROM, back pain.   Skin: Negative for rash, sunburn, itching.   Neurological: Negative for dizziness and seizures.   Hematological: Negative for adenopathy. Does not bruise/bleed easily.   Endocrine: Negative for rapid weight loss/weight gain, heat/cold intolerance.     Past Medical History   Patient Active Problem List   Diagnosis    Rheumatoid arthritis    Essential hypertension    GERD (gastroesophageal reflux disease)    Premature atrial complexes    Shortness of breath    Cellulitis of right lower extremity    Other hyperlipidemia    Immunosuppressed status    Infectious diarrhea    Metabolic acidosis    Pulmonary hypertension    Restrictive lung disease           Past Surgical History   Past Surgical History:   Procedure Laterality Date    LUMBAR SPINE SURGERY           Family History   Family History   Problem Relation Age of Onset    Asthma Mother     Breast cancer Neg Hx     Colon cancer Neg Hx     Ovarian cancer Neg Hx            Social History   .  Social History     Socioeconomic History    Marital status:    Tobacco Use    Smoking status: Never     Passive exposure: Never    Smokeless tobacco: Never   Substance and Sexual Activity    Alcohol use: Yes     Comment: occ    Drug use: Never    Sexual activity: Not Currently     Partners: Male     Birth control/protection:  Post-menopausal         Allergies   Review of patient's allergies indicates:   Allergen Reactions    Iodine      Swelling (throat)^  Unsure of what drug/food triggered the reaction      Adhesive Itching    Penicillins            Physical Exam     Vitals:    03/21/24 1440   BP: (!) 153/81   Pulse: 73         There is no height or weight on file to calculate BMI.      General: AOx3, NAD   Respiratory:  Symmetric chest rise, normal effort  Nose: No gross nasal septal deviation. Inferior Turbinates WNL bilaterally. No septal perforation. No masses/lesions.   Oral Cavity:  Oral Tongue mobile, no lesions noted. Hard Palate WNL. No buccal or FOM lesions.  Oropharynx:  No masses/lesions of the posterior pharyngeal wall. Tonsillar fossa without lesions. Soft palate without masses. Midline uvula.   Neck: No scars.  No cervical lymphadenopathy, thyromegaly or thyroid nodules.  Normal range of motion.    Face: House Brackmann I bilaterally.     Flex Naso Wilma Hypo Procedures #2    Procedure:  Diagnostic flexible nasopharyngoscopy, laryngoscopy and hypopharyngoscopy:    Routine preparation with local atomizer with 1% neosynephrine/pontocaine with customary flexible endoscope.    Nasopharynx:  No lesions.   Mucosa:  No lesions.   Adenoids:  Present.  Posterior Choanae:  Patent.  Eustachian Tubes:  Patent.  Posterior pharynx:  No lesions.  Larynx/hypopharynx:   Epiglottis:  No lesions, without edema.   AE Folds:  No lesions.   Vocal cords:  No polyps, nodules, ulcers or lesions.   Mobility:  Equal and normal bilateral.   Hypopharynx:  No lesions.   Piriform sinus:  No pooling, no lesions.   Post Cricoid:  No erythema, no edema.      Assessment/Plan  Problem List Items Addressed This Visit          Pulmonary    Shortness of breath     Upper airway exam unremarkable.  Reassured.  Return p.r.n..

## 2024-03-21 NOTE — PROGRESS NOTES
ALLERGY & IMMUNOLOGY CLINIC   HISTORY OF PRESENT ILLNESS   Referral from: No ref. provider found  CC: No chief complaint on file.  CC; angioedema follow-up    HPI: Jocelyne Duncan is a 78 y.o. female here for angioedema episode here with her .  Since LV has been doing well  Had colonoscopy but incomplete clean out, CT scheduled with repeat scope planned given 25lb weight loss and anemia  Breast mammo with secondary imaging study which was painful, follow-up in August, no biopsy at this time planned per patient  No further angioedema episodes   Remains off ACEi  Rhinitis flaring with onset of tree pollen season relief with flonase    Initial visit:  24 was eating a Ricola cough drop and tongue started to swell relief with benadryl    Admitted - for angioedema requiring intubation   Was drinking GI prep (got through half a gallon)  Then tongue started to swell and couldn't breathe  Took 2 benadryl without relief, instead it was getting worse  Uber to Ligia  Admitted for 2 weeks  On ventilator for 3 days    In 2024 left side of face swelled  Relief with benadryl    Separately had IV contrast at [facility] and tongue swelled (similar symptoms as recent episode)     Top of eyes are also swollen    Also took 2 antibiotics for eye swelling, first one made her tongue swell, tried the second one and same thing happened relief with benadryl with [place]    Got c diff during hospitalization had 2 months of diarrhea after, relief with green plantain    Was on lisinopril, now she is off since [facility] 2024    Mom and sister  of leukemia    Angioedema location: tongue, face, maybe eyes  Urticaria: no  Anaphylaxis: no but hard to breathe from swelling  Duration: Usually relief with benadryl except with 2023  Frequency: 4 times  Age of onset: Years ago at [facility], then last year recurred 3 times  Hormone therapy: no  Association with puberty/menses/pregnancy: no  Association with facial  paralysis: No  Taking ACEi: was on lisinopril  Known hematological/lymphoproliferative disorders:   Known autoimmune disorders: rheumatoid arthritis  Family history angioedema: no  Worse with NSAIDs, alcohol, stress: No, maybe stress  Response to antihistamines, steroids, epi, prior C1 esterase inhibitor replacement:        Drug Allergies:   Review of patient's allergies indicates:   Allergen Reactions    Iodine      Swelling (throat)^  Unsure of what drug/food triggered the reaction      Adhesive Itching    Penicillins          MEDICAL HISTORY   MedHx:   Patient Active Problem List   Diagnosis    Rheumatoid arthritis    Essential hypertension    GERD (gastroesophageal reflux disease)    Premature atrial complexes    Shortness of breath    Cellulitis of right lower extremity    Other hyperlipidemia    Immunosuppressed status    Infectious diarrhea    Metabolic acidosis    Pulmonary hypertension    Restrictive lung disease       SurgHx:  Past Surgical History:   Procedure Laterality Date    LUMBAR SPINE SURGERY         Medications:   Current Outpatient Medications on File Prior to Visit   Medication Sig Dispense Refill    acetaminophen (TYLENOL) 325 MG tablet Take 1 tablet by mouth every morning and 1 to 2 tablets every night at bedtime.      albuterol (PROVENTIL/VENTOLIN HFA) 90 mcg/actuation inhaler Inhale 2 puffs into the lungs every 6 (six) hours as needed for Wheezing or Shortness of Breath. 18 g 11    amLODIPine (NORVASC) 5 MG tablet Take 5 mg by mouth once daily.       aspirin (ECOTRIN) 81 MG EC tablet Take 1 tablet by mouth once daily.      atorvastatin (LIPITOR) 20 MG tablet Take 20 mg by mouth once daily.       BREO ELLIPTA 200-25 mcg/dose DsDv diskus inhaler Inhale 1 puff into the lungs once daily.      calcium carbonate/vitamin D3 (VITAMIN D-3 ORAL) Take 1 capsule by mouth once daily.      diclofenac sodium (VOLTAREN) 1 % Gel Apply 2 g topically 1 to 3 times daily as needed for pain.      esomeprazole  (NEXIUM) 40 MG capsule Take 40 mg by mouth once daily.      fluticasone propionate (FLONASE) 50 mcg/actuation nasal spray 1 spray (50 mcg total) by Each Nostril route 2 (two) times daily as needed for Rhinitis. 15 g 0    furosemide (LASIX) 20 MG tablet Take 20 mg by mouth every other day.      gabapentin (NEURONTIN) 100 MG capsule Take 100 mg by mouth 3 (three) times daily.      guaiFENesin (MUCINEX) 600 mg 12 hr tablet Take 2 tablets (1,200 mg total) by mouth 2 (two) times daily. 120 tablet 11    leflunomide (ARAVA) 20 MG Tab Take 20 mg by mouth once daily.      lisinopril-hydrochlorothiazide (PRINZIDE,ZESTORETIC) 20-12.5 mg per tablet Take 1 tablet by mouth once daily.       meloxicam (MOBIC) 15 MG tablet Take 15 mg by mouth once daily.      methotrexate 2.5 MG Tab every 7 days.       nystatin (MYCOSTATIN) cream Apply topically as needed.      predniSONE (DELTASONE) 1 MG tablet Take 2 mg by mouth every morning.       No current facility-administered medications on file prior to visit.      PHYSICAL EXAM   VS: LMP  (LMP Unknown)   GENERAL: NAD, well nourished, well appearing  EYES: no conjunctival injection, no discharge, no infraorbital shiners  EARS: external auditory canals normal B/L  ORAL: MMM, no ulcers, no thrush, no cobblestoning  LUNGS:  no increased WOB  DERM: no rashes     LABS   Tryptase: 4  C4, c1 esterase inhibitor panel and functional, and C1q binding assay: all normal   ASSESSMENT & PLAN     Jocelyne Duncan is a 78 y.o. female with     Angioedema 3x, once requiring intubation  - Normal angioedema workup  - Could be histamine mediated given response to benadryl in the past  - Try 2 tabs twice a day of antihistamines if this recurs at onset, these work fast in 30 minutes  - Epipen sent after discussion, I'm not sure it would be helpful but can certainly try it  - This is not consistent with anaphylaxis  - We discussed whether golytely could have caused this, but she only was taking this during her  last episode. Not during the 3 prior to that. Therefore I encourage her to undergo colonoscopy  - Avoid lisinopril, in an abundance of caution (stopped 12/2024, can take 3 months to clear system, avoid all ACEi but ok to use ARBs)    Chronic rhinitis  - Relief with flonase, no azelastine today per patient preference    Anemia with weight loss  - Concerning for malignancy, discussed today, both mom and sister passed away from leukemia and 's mom passed away from breast cancer  - Has breast follow-up scheduled  - Has colonoscopy planned    Contrast allergy  - Angioedema during this that resolved with benadryl  - Consider premedication with antihistamines/prednisone in future if needed again vs. Skin testing, however given episodic spontaneous angioedema will start evaluation with that     Penicillin allergy  - Not discussed today, consider penicillin challenge in clinic in future if would like, based on history    Follow up: pending labs, penicillin allergy eval

## 2024-03-21 NOTE — ASSESSMENT & PLAN NOTE
Much worsening shortness of breath over the last several months  - is frustrated because she hasn't been able to tell what is wrong  - repeating echo and PFTs to ensure stability   - continue breathing treatments   - continue RA treatment

## 2024-03-22 NOTE — ASSESSMENT & PLAN NOTE
Previously told that she has asthma and is being treated with breo which is helping  - continue using breo and albuterol since it is helping  - restriction on PFTs and given RA and mtx use-   - CT chest with no further evidence of ILD or any progressive disorder

## 2024-03-22 NOTE — PROGRESS NOTES
Subjective:     Reason for visit: shortness of breath     Patient ID:  Jocelyne Duncan is a 78 y.o. female  Interval History   Ms. Duncan reports feeling much better than she has in quite some time although she still does have some episodes of shortness of breath.  She is following up with ENT, and allergy after her intubation from angioedema back in November.  She is continuing on her current treatments and we will plan to follow up with Cardiology soon.    Initial History:  78 yo with RA and previously told she had asthma that presents for evaluation of her shortness of breath. Her symptoms have been present for some time and she was recently started on breo which seems to help a lot. She has issues with exertion that cause shortness of breath but has also started noticing some shortness of breath at rest. IT has improved since starting the breo but is still present. She also notes a strange tightness in her chest that is intermittent and typically gets better without any interventions.     Interval History 10/19/22:   Ms. Casanova is doing ok and feels that the breo is definitely helping her asthma symptoms. She does still have occasional episodes of difficulty catching her breath when laying on her left side at times. She does overall feel better than she had before. She does have a new cough for the last few days that started with the weather change.     History 1/24/24:   Ms. Duncan has had a hard time getting in to see us and has been hospitalized twice in the last 6 months. She was hospitalized in November at [facility] with what sounds like angioedema and required intubation for 3 days and since then her throat has been significantly sore and her shortness of breath has been much worse. She was also seen recently with significant shortness of breath and her  is also with her and worried that no one will help her now that she is older.     Objective:     Vitals:    03/21/24 1259   BP: 136/76   Pulse: 74  "  SpO2: (!) 92%   Weight: 61.6 kg (135 lb 12.9 oz)   Height: 5' 2" (1.575 m)         Physical Exam  Vitals and nursing note reviewed.   Constitutional:       General: She is not in acute distress.     Appearance: She is not diaphoretic.   HENT:      Head: Normocephalic and atraumatic.      Right Ear: External ear normal.      Left Ear: External ear normal.   Eyes:      Conjunctiva/sclera: Conjunctivae normal.      Pupils: Pupils are equal, round, and reactive to light.   Neck:      Trachea: No tracheal deviation.   Cardiovascular:      Rate and Rhythm: Normal rate and regular rhythm.      Heart sounds: Normal heart sounds. No murmur heard.  Pulmonary:      Effort: Pulmonary effort is normal. No respiratory distress.      Breath sounds: No stridor. No wheezing, rhonchi or rales.      Comments: Conversational dyspnea   Abdominal:      General: Bowel sounds are normal. There is no distension.      Palpations: Abdomen is soft.      Tenderness: There is no abdominal tenderness.   Musculoskeletal:         General: Deformity (arthritis of both hands) present. Normal range of motion.      Cervical back: Normal range of motion and neck supple.   Skin:     General: Skin is warm and dry.      Findings: No erythema.   Neurological:      Mental Status: She is alert and oriented to person, place, and time.      Gait: Gait is intact.   Psychiatric:         Mood and Affect: Mood and affect normal.         Cognition and Memory: Memory normal.         Judgment: Judgment normal.          Discussed CT and echo findings with patient and her .       Results for orders placed during the hospital encounter of 09/02/22    Echo    Interpretation Summary  · The left ventricle is normal in size with normal systolic function.  · The estimated ejection fraction is 60%.  · Moderate left atrial enlargement.  · Left ventricular diastolic dysfunction.  · Normal right ventricular size with normal right ventricular systolic function.  · Mild " tricuspid regurgitation.  · Normal central venous pressure (3 mmHg).  · The estimated PA systolic pressure is 32 mmHg.  · Trivial pericardial effusion. Under the atria.      Assessment:     1. Pulmonary hypertension  Echo    Spirometry with/without bronchodilator    DLCO-Carbon Monoxide Diffusing Capacity    Lung Volumes      2. Seasonal allergic rhinitis due to pollen  levocetirizine (XYZAL) 5 MG tablet      3. Restrictive lung disease        4. Shortness of breath        5. Gastroesophageal reflux disease without esophagitis            Current Outpatient Medications   Medication Instructions    acetaminophen (TYLENOL) 325 MG tablet Take 1 tablet by mouth every morning and 1 to 2 tablets every night at bedtime.    albuterol (PROVENTIL/VENTOLIN HFA) 90 mcg/actuation inhaler 2 puffs, Inhalation, Every 6 hours PRN    amLODIPine (NORVASC) 5 mg, Oral, Daily    aspirin (ECOTRIN) 81 MG EC tablet 1 tablet, Oral, Daily    atorvastatin (LIPITOR) 20 mg, Oral, Daily    BREO ELLIPTA 200-25 mcg/dose DsDv diskus inhaler 1 puff, Inhalation, Daily    calcium carbonate/vitamin D3 (VITAMIN D-3 ORAL) 1 capsule, Oral, Daily    diclofenac sodium (VOLTAREN) 1 % Gel Apply 2 g topically 1 to 3 times daily as needed for pain.    EPINEPHrine (EPIPEN 2-CRYSTAL) 0.3 mg, Intramuscular, Once    esomeprazole (NEXIUM) 40 mg, Oral, Daily    fluticasone propionate (FLONASE) 50 mcg, Each Nostril, 2 times daily PRN    furosemide (LASIX) 20 mg, Oral, Every other day    gabapentin (NEURONTIN) 100 mg, Oral, 3 times daily    guaiFENesin (MUCINEX) 1,200 mg, Oral, 2 times daily    leflunomide (ARAVA) 20 mg, Oral, Daily    levocetirizine (XYZAL) 5 mg, Oral, Nightly    lisinopril-hydrochlorothiazide (PRINZIDE,ZESTORETIC) 20-12.5 mg per tablet 1 tablet, Oral, Daily    meloxicam (MOBIC) 15 mg, Oral, Daily    methotrexate 2.5 MG Tab Every 7 days    nystatin (MYCOSTATIN) cream Topical (Top), As needed (PRN)    predniSONE (DELTASONE) 2 mg, Oral, Every morning      Jocelyne  I. Trusty had no medications administered during this visit.     Orders Placed This Encounter   Procedures    Echo     Standing Status:   Future     Standing Expiration Date:   3/21/2025     Order Specific Question:   Release to patient     Answer:   Immediate    Spirometry with/without bronchodilator     Standing Status:   Future     Standing Expiration Date:   3/21/2025     Order Specific Question:   Release to patient     Answer:   Immediate    DLCO-Carbon Monoxide Diffusing Capacity     Standing Status:   Future     Standing Expiration Date:   3/21/2025     Order Specific Question:   Release to patient     Answer:   Immediate    Lung Volumes     Standing Status:   Future     Standing Expiration Date:   3/21/2025     Order Specific Question:   Release to patient     Answer:   Immediate        Plan:       Problem List Items Addressed This Visit          Pulmonary    Shortness of breath    Current Assessment & Plan     Previously told that she has asthma and is being treated with breo which is helping  - continue using breo and albuterol since it is helping  - restriction on PFTs and given RA and mtx use-   - CT chest with no further evidence of ILD or any progressive disorder            Restrictive lung disease    Current Assessment & Plan     Much worsening shortness of breath over the last several months  - is frustrated because she hasn't been able to tell what is wrong  - repeating echo and PFTs to ensure stability   - continue breathing treatments   - continue RA treatment               Cardiac/Vascular    Pulmonary hypertension - Primary    Overview     Noted on echo in 2022- follows with cardiology   Burn that this may be driving shortness of breath repeat echo is pending         Relevant Orders    Echo    Spirometry with/without bronchodilator    DLCO-Carbon Monoxide Diffusing Capacity    Lung Volumes       GI    GERD (gastroesophageal reflux disease)    Current Assessment & Plan     Continue medication  and precautions           Other Visit Diagnoses       Seasonal allergic rhinitis due to pollen        Relevant Medications    levocetirizine (XYZAL) 5 MG tablet           Emily Coffman M.D.  Pulmonary/Critical Care

## 2024-04-30 ENCOUNTER — HOSPITAL ENCOUNTER (OUTPATIENT)
Dept: CARDIOLOGY | Facility: HOSPITAL | Age: 79
Discharge: HOME | End: 2024-04-30
Attending: INTERNAL MEDICINE
Payer: MEDICARE

## 2024-04-30 VITALS
BODY MASS INDEX: 25.49 KG/M2 | SYSTOLIC BLOOD PRESSURE: 150 MMHG | DIASTOLIC BLOOD PRESSURE: 80 MMHG | HEART RATE: 80 BPM | WEIGHT: 135 LBS | HEIGHT: 61 IN

## 2024-04-30 DIAGNOSIS — I27.20 PULMONARY HYPERTENSION: ICD-10-CM

## 2024-04-30 LAB
ASCENDING AORTA: 3 CM
AV INDEX (PROSTH): 0.52
AV MEAN GRADIENT: 13 MMHG
AV PEAK GRADIENT: 24 MMHG
AV VALVE AREA BY VELOCITY RATIO: 1.74 CM²
AV VALVE AREA: 1.65 CM²
AV VELOCITY RATIO: 0.55
BSA FOR ECHO PROCEDURE: 1.62 M2
CV ECHO LV RWT: 0.37 CM
DOP CALC AO PEAK VEL: 2.46 M/S
DOP CALC AO VTI: 54.42 CM
DOP CALC LVOT AREA: 3.1 CM2
DOP CALC LVOT DIAMETER: 2 CM
DOP CALC LVOT PEAK VEL: 1.36 M/S
DOP CALC LVOT STROKE VOLUME: 89.68 CM3
DOP CALCLVOT PEAK VEL VTI: 28.56 CM
E/E' RATIO: 21.75 M/S
ECHO LV POSTERIOR WALL: 0.92 CM (ref 0.6–1.1)
EJECTION FRACTION: 53 %
FRACTIONAL SHORTENING: 25 % (ref 28–44)
INTERVENTRICULAR SEPTUM: 1.08 CM (ref 0.6–1.1)
IVRT: 91.34 MSEC
LA MAJOR: 5.55 CM
LA MINOR: 5.41 CM
LA WIDTH: 4.61 CM
LEFT ATRIUM SIZE: 4.01 CM
LEFT ATRIUM VOLUME INDEX MOD: 58.1 ML/M2
LEFT ATRIUM VOLUME INDEX: 53.8 ML/M2
LEFT ATRIUM VOLUME MOD: 92.9 CM3
LEFT ATRIUM VOLUME: 86.09 CM3
LEFT INTERNAL DIMENSION IN SYSTOLE: 3.72 CM (ref 2.1–4)
LEFT VENTRICLE DIASTOLIC VOLUME INDEX: 73.44 ML/M2
LEFT VENTRICLE DIASTOLIC VOLUME: 117.5 ML
LEFT VENTRICLE MASS INDEX: 113 G/M2
LEFT VENTRICLE SYSTOLIC VOLUME INDEX: 36.8 ML/M2
LEFT VENTRICLE SYSTOLIC VOLUME: 58.94 ML
LEFT VENTRICULAR INTERNAL DIMENSION IN DIASTOLE: 4.99 CM (ref 3.5–6)
LEFT VENTRICULAR MASS: 181.38 G
LV LATERAL E/E' RATIO: 17.4 M/S
LV SEPTAL E/E' RATIO: 29 M/S
MV A" WAVE DURATION": 19.12 MSEC
MV PEAK E VEL: 0.87 M/S
OHS CV RV/LV RATIO: 0.62 CM
PISA TR MAX VEL: 3.11 M/S
PULM VEIN S/D RATIO: 1.58
PV PEAK D VEL: 0.26 M/S
PV PEAK S VEL: 0.41 M/S
RA MAJOR: 4.49 CM
RA PRESSURE ESTIMATED: 3 MMHG
RA WIDTH: 3.54 CM
RIGHT VENTRICULAR END-DIASTOLIC DIMENSION: 3.08 CM
RV TB RVSP: 6 MMHG
SINUS: 3.11 CM
STJ: 2.65 CM
TDI LATERAL: 0.05 M/S
TDI SEPTAL: 0.03 M/S
TDI: 0.04 M/S
TR MAX PG: 39 MMHG
TRICUSPID ANNULAR PLANE SYSTOLIC EXCURSION: 2.21 CM
TV REST PULMONARY ARTERY PRESSURE: 42 MMHG
Z-SCORE OF LEFT VENTRICULAR DIMENSION IN END DIASTOLE: 0.92
Z-SCORE OF LEFT VENTRICULAR DIMENSION IN END SYSTOLE: 2.18

## 2024-04-30 PROCEDURE — 93306 TTE W/DOPPLER COMPLETE: CPT

## 2024-04-30 PROCEDURE — 93306 TTE W/DOPPLER COMPLETE: CPT | Mod: 26,,, | Performed by: INTERNAL MEDICINE

## 2024-07-18 ENCOUNTER — OFFICE VISIT (OUTPATIENT)
Dept: OBSTETRICS AND GYNECOLOGY | Facility: CLINIC | Age: 79
End: 2024-07-18
Payer: MEDICARE

## 2024-07-18 VITALS
HEIGHT: 60 IN | BODY MASS INDEX: 27.01 KG/M2 | WEIGHT: 137.56 LBS | SYSTOLIC BLOOD PRESSURE: 140 MMHG | DIASTOLIC BLOOD PRESSURE: 80 MMHG

## 2024-07-18 DIAGNOSIS — R35.1 NOCTURIA: ICD-10-CM

## 2024-07-18 DIAGNOSIS — N76.0 ACUTE VAGINITIS: Primary | ICD-10-CM

## 2024-07-18 PROCEDURE — 99213 OFFICE O/P EST LOW 20 MIN: CPT | Mod: S$GLB,,, | Performed by: FAMILY MEDICINE

## 2024-07-18 PROCEDURE — 99999 PR PBB SHADOW E&M-EST. PATIENT-LVL III: CPT | Mod: PBBFAC,,, | Performed by: FAMILY MEDICINE

## 2024-07-18 PROCEDURE — 87186 SC STD MICRODIL/AGAR DIL: CPT | Performed by: FAMILY MEDICINE

## 2024-07-18 PROCEDURE — 1160F RVW MEDS BY RX/DR IN RCRD: CPT | Mod: CPTII,S$GLB,, | Performed by: FAMILY MEDICINE

## 2024-07-18 PROCEDURE — 1126F AMNT PAIN NOTED NONE PRSNT: CPT | Mod: CPTII,S$GLB,, | Performed by: FAMILY MEDICINE

## 2024-07-18 PROCEDURE — 3079F DIAST BP 80-89 MM HG: CPT | Mod: CPTII,S$GLB,, | Performed by: FAMILY MEDICINE

## 2024-07-18 PROCEDURE — 1159F MED LIST DOCD IN RCRD: CPT | Mod: CPTII,S$GLB,, | Performed by: FAMILY MEDICINE

## 2024-07-18 PROCEDURE — 87088 URINE BACTERIA CULTURE: CPT | Performed by: FAMILY MEDICINE

## 2024-07-18 PROCEDURE — 3077F SYST BP >= 140 MM HG: CPT | Mod: CPTII,S$GLB,, | Performed by: FAMILY MEDICINE

## 2024-07-18 PROCEDURE — 87086 URINE CULTURE/COLONY COUNT: CPT | Performed by: FAMILY MEDICINE

## 2024-07-18 PROCEDURE — 1101F PT FALLS ASSESS-DOCD LE1/YR: CPT | Mod: CPTII,S$GLB,, | Performed by: FAMILY MEDICINE

## 2024-07-18 PROCEDURE — 3288F FALL RISK ASSESSMENT DOCD: CPT | Mod: CPTII,S$GLB,, | Performed by: FAMILY MEDICINE

## 2024-07-18 RX ORDER — METRONIDAZOLE 7.5 MG/G
1 GEL VAGINAL NIGHTLY
Qty: 70 G | Refills: 0 | Status: SHIPPED | OUTPATIENT
Start: 2024-07-18 | End: 2024-07-23

## 2024-07-18 RX ORDER — TRAMADOL HYDROCHLORIDE 100 MG/1
100 TABLET, EXTENDED RELEASE ORAL DAILY
COMMUNITY

## 2024-07-18 NOTE — PROGRESS NOTES
CC: Vaginitis  HPI: Patient presents for evaluation of an abnormal vaginal odor. Symptoms have been present for 6  months . Vaginal symptoms: fishy odor pt unsure vaginal or urinary. Contraception: status post hysterectomy. She denies discharge, local irritation, urinary symptoms of dysuria, hematuria, and fever, and vulvar itching. She has had nocturia about the same time. Not SA. Was in the ICU for a few days just before sx started so she thinks it was from something they were doing to collect her urine. Also mentions abdominal/pelvic pain about the same amount of time she is seeing GI for.  This is the extent of the patient's complaints at this time.     ROS:  GENERAL: No fever, chills, fatigability or weight loss.  VULVAR: No pain, no lesions and no itching.  VAGINAL: No relaxation, no itching, no discharge, no abnormal bleeding and no lesions. +odor, +abdominal/pelvic pain  URINARY: No incontinence, no nocturia, no frequency and no dysuria. +nocturia     PHYSICAL EXAM:  Physical Exam:   Constitutional: She appears well-developed and well-nourished. She does not appear ill. No distress.               Genitourinary:    Right adnexa and left adnexa normal.      Pelvic exam was performed with patient in the lithotomy position.   The external female genitalia was normal.     Labial bartholins normal.There is no rash, tenderness or lesion on the right labia. There is no rash, tenderness or lesion on the left labia. Right adnexum displays no mass, no tenderness and no fullness. Left adnexum displays no mass, no tenderness and no fullness. There is vaginal discharge (very scant yellow) in the vagina. No tenderness, bleeding, rectocele, cystocele or prolapse of vaginal walls in the vagina.    No foreign body in the vagina.   Vaginal atrophy noted. Cervix is absent.Uterus is absent. Normal urethral meatus.Urethra findings: no urethral mass              Neurological: She is alert. GCS eye subscore is 4. GCS verbal  subscore is 5. GCS motor subscore is 6.           Past Medical History:   Diagnosis Date    Allergy     Arthritis     GERD (gastroesophageal reflux disease)     HTN (hypertension)     Premature atrial complexes     Rheumatoid arthritis        Past Surgical History:   Procedure Laterality Date    LUMBAR SPINE SURGERY      PARTIAL HYSTERECTOMY         Family History   Problem Relation Name Age of Onset    Asthma Mother      Breast cancer Neg Hx      Colon cancer Neg Hx      Ovarian cancer Neg Hx         Social History     Socioeconomic History    Marital status:    Tobacco Use    Smoking status: Never     Passive exposure: Never    Smokeless tobacco: Never   Substance and Sexual Activity    Alcohol use: Yes     Comment: occ    Drug use: Never    Sexual activity: Not Currently     Partners: Male     Birth control/protection: Post-menopausal       Current Outpatient Medications   Medication Sig Dispense Refill    acetaminophen (TYLENOL) 325 MG tablet Take 1 tablet by mouth every morning and 1 to 2 tablets every night at bedtime.      albuterol (PROVENTIL/VENTOLIN HFA) 90 mcg/actuation inhaler Inhale 2 puffs into the lungs every 6 (six) hours as needed for Wheezing or Shortness of Breath. 18 g 11    amLODIPine (NORVASC) 5 MG tablet Take 5 mg by mouth once daily.       aspirin (ECOTRIN) 81 MG EC tablet Take 1 tablet by mouth once daily.      atorvastatin (LIPITOR) 20 MG tablet Take 20 mg by mouth once daily.       BREO ELLIPTA 200-25 mcg/dose DsDv diskus inhaler Inhale 1 puff into the lungs once daily.      calcium carbonate/vitamin D3 (VITAMIN D-3 ORAL) Take 1 capsule by mouth once daily.      diclofenac sodium (VOLTAREN) 1 % Gel Apply 2 g topically 1 to 3 times daily as needed for pain.      esomeprazole (NEXIUM) 40 MG capsule Take 40 mg by mouth once daily.      fluticasone propionate (FLONASE) 50 mcg/actuation nasal spray 1 spray (50 mcg total) by Each Nostril route 2 (two) times daily as needed for Rhinitis.  15 g 0    furosemide (LASIX) 20 MG tablet Take 20 mg by mouth every other day.      gabapentin (NEURONTIN) 100 MG capsule Take 100 mg by mouth 3 (three) times daily.      guaiFENesin (MUCINEX) 600 mg 12 hr tablet Take 2 tablets (1,200 mg total) by mouth 2 (two) times daily. 120 tablet 11    leflunomide (ARAVA) 20 MG Tab Take 20 mg by mouth once daily.      levocetirizine (XYZAL) 5 MG tablet Take 1 tablet (5 mg total) by mouth every evening. 30 tablet 11    lisinopril-hydrochlorothiazide (PRINZIDE,ZESTORETIC) 20-12.5 mg per tablet Take 1 tablet by mouth once daily.       meloxicam (MOBIC) 15 MG tablet Take 15 mg by mouth once daily.      methotrexate 2.5 MG Tab every 7 days.       nystatin (MYCOSTATIN) cream Apply topically as needed.      predniSONE (DELTASONE) 1 MG tablet Take 2 mg by mouth every morning.      traMADoL (ULTRAM-ER) 100 MG Tb24 Take 100 mg by mouth once daily.      EPINEPHrine (EPIPEN 2-CRYSTAL) 0.3 mg/0.3 mL AtIn Inject 0.3 mLs (0.3 mg total) into the muscle once. for 1 dose 0.3 mL 1    metroNIDAZOLE (METROGEL) 0.75 % (37.5mg/5 gram) vaginal gel Place 1 applicator vaginally every evening. For 5 nights - do not drink alcohol while taking and for 2 days after stopping for 5 days 70 g 0     No current facility-administered medications for this visit.       Review of patient's allergies indicates:   Allergen Reactions    Iodine      Swelling (throat)^  Unsure of what drug/food triggered the reaction      Adhesive Itching    Penicillins           OB History    Para Term  AB Living   2 2 2     2   SAB IAB Ectopic Multiple Live Births           2      # Outcome Date GA Lbr Kolby/2nd Weight Sex Type Anes PTL Lv   2 Term     M Vag-Spont   RYLIE   1 Term     F Vag-Spont   RYLIE          Assessment/Plan:    Acute vaginitis  -     metroNIDAZOLE (METROGEL) 0.75 % (37.5mg/5 gram) vaginal gel; Place 1 applicator vaginally every evening. For 5 nights - do not drink alcohol while taking and for 2 days after  stopping for 5 days  Dispense: 70 g; Refill: 0    Nocturia  -     CULTURE, URINE    Discussed unsure of insurance coverage with vaginosis swab. Pt would like to defer  -offered TVUS, she would like to see what testing shows first then consider. Can refer to urology if cx neg and nocturia persists.      Patient was counseled today on vaginitis prevention including :  a. avoiding feminine products such as deoderant soaps, body wash, bubble bath, douches, scented toilet paper, deoderant tampons or pads, feminine wipes, chronic pad use, etc.  b. avoiding other vulvovaginal irritants such as long hot baths, humidity, tight, synthetic clothing, chlorine and sitting around in wet bathing suits  c. wearing cotton underwear, avoiding thong underwear and no underwear to bed  d. taking showers instead of baths and use a hair dryer on cool setting afterwards to dry  e. wearing cotton to exercise and shower immediately after exercise and change clothes  f. using polyurethane condoms without spermicide if sexually active and symptoms are triggered by intercourse     FOLLOW UP: PRN/lack of improvement.

## 2024-07-20 LAB — BACTERIA UR CULT: ABNORMAL

## 2024-07-22 ENCOUNTER — TELEPHONE (OUTPATIENT)
Dept: OBSTETRICS AND GYNECOLOGY | Facility: CLINIC | Age: 79
End: 2024-07-22
Payer: MEDICARE

## 2024-07-23 ENCOUNTER — TELEPHONE (OUTPATIENT)
Dept: OBSTETRICS AND GYNECOLOGY | Facility: CLINIC | Age: 79
End: 2024-07-23
Payer: MEDICARE

## 2024-07-23 DIAGNOSIS — N30.00 ACUTE CYSTITIS WITHOUT HEMATURIA: Primary | ICD-10-CM

## 2024-07-23 RX ORDER — NITROFURANTOIN 25; 75 MG/1; MG/1
100 CAPSULE ORAL 2 TIMES DAILY
Qty: 10 CAPSULE | Refills: 0 | Status: SHIPPED | OUTPATIENT
Start: 2024-07-23 | End: 2024-07-28

## 2024-07-23 NOTE — TELEPHONE ENCOUNTER
Discussed with pt +ucx. Will send macrobid. She is on methotrexate, has appt with rheumatology tomorrow will discuss if they want her to stop while taking the macrobid. She is on a low dose. If nocturia or pelvic pain continues she will let me know for urology referral and to schedule tvus

## 2024-09-10 NOTE — PROGRESS NOTES
Patient declined ED navigation assessment and denied any needs at this time.    ED navigator assisted patient with setting an appt with Orthopedics for the results of her recent ED visit on 4/20/22 @ 2 PM with Cecily Tinajero NP.   Patient states she normally sees her orthopedic outside [facility] and they are on vacation. Patient would like to see an ortho at [facility].        Susana Mccloud, ED Navigator, [facility]  [phone], ext. [phone]     impairments found/risk reduction/prevention

## 2024-09-23 ENCOUNTER — OFFICE VISIT (OUTPATIENT)
Dept: PULMONOLOGY | Facility: CLINIC | Age: 79
End: 2024-09-23
Payer: MEDICARE

## 2024-09-23 ENCOUNTER — TELEPHONE (OUTPATIENT)
Dept: PULMONOLOGY | Facility: CLINIC | Age: 79
End: 2024-09-23
Payer: MEDICARE

## 2024-09-23 ENCOUNTER — HOSPITAL ENCOUNTER (OUTPATIENT)
Dept: PULMONOLOGY | Facility: CLINIC | Age: 79
Discharge: HOME | End: 2024-09-23
Payer: MEDICARE

## 2024-09-23 VITALS
DIASTOLIC BLOOD PRESSURE: 82 MMHG | OXYGEN SATURATION: 97 % | HEIGHT: 60 IN | BODY MASS INDEX: 27.29 KG/M2 | WEIGHT: 139 LBS | SYSTOLIC BLOOD PRESSURE: 130 MMHG | HEART RATE: 70 BPM

## 2024-09-23 DIAGNOSIS — R06.02 SHORTNESS OF BREATH: ICD-10-CM

## 2024-09-23 DIAGNOSIS — I27.20 PULMONARY HYPERTENSION: ICD-10-CM

## 2024-09-23 DIAGNOSIS — J98.4 RESTRICTIVE LUNG DISEASE: ICD-10-CM

## 2024-09-23 DIAGNOSIS — J84.9 INTERSTITIAL PULMONARY DISEASE, UNSPECIFIED: Primary | ICD-10-CM

## 2024-09-23 DIAGNOSIS — J68.3 REACTIVE AIRWAYS DYSFUNCTION SYNDROME: ICD-10-CM

## 2024-09-23 DIAGNOSIS — D84.9 IMMUNOSUPPRESSED STATUS: ICD-10-CM

## 2024-09-23 DIAGNOSIS — K21.9 GASTROESOPHAGEAL REFLUX DISEASE WITHOUT ESOPHAGITIS: ICD-10-CM

## 2024-09-23 LAB
DLCO SINGLE BREATH LLN: 11.9
DLCO SINGLE BREATH PRE REF: 51.8 %
DLCO SINGLE BREATH REF: 17.63
DLCOC SBVA LLN: 2.48
DLCOC SBVA REF: 4.13
DLCOC SINGLE BREATH LLN: 11.9
DLCOC SINGLE BREATH REF: 17.63
DLCOCSBVAULN: 5.78
DLCOCSINGLEBREATHULN: 23.36
DLCOSINGLEBREATHULN: 23.36
DLCOSINGLEBREATHZSCORE: -2.44
DLCOVA LLN: 2.48
DLCOVA PRE REF: 80.6 %
DLCOVA PRE: 3.33 ML/(MIN*MMHG*L) (ref 2.48–5.78)
DLCOVA REF: 4.13
DLCOVAULN: 5.78
ERVN2 LLN: -16449.51
ERVN2 PRE REF: 47.3 %
ERVN2 PRE: 0.23 L (ref -16449.51–16450.49)
ERVN2 REF: 0.49
ERVN2ULN: ABNORMAL
FEF 25 75 LLN: 0.77
FEF 25 75 PRE REF: 65.3 %
FEF 25 75 REF: 2.17
FET100 CHG: -0.2 %
FEV05 LLN: 0.54
FEV05 REF: 1.4
FEV1 CHG: -5.3 %
FEV1 FVC LLN: 63
FEV1 FVC PRE REF: 102.3 %
FEV1 FVC REF: 78
FEV1 LLN: 1.21
FEV1 PRE REF: 84.4 %
FEV1 REF: 1.72
FEV1 VOL CHG: -0.08
FEV1FVCZSCORE: 0.22
FEV1ZSCORE: -0.88
FRCN2 LLN: 1.67
FRCN2 PRE REF: 68.2 %
FRCN2 REF: 2.49
FRCN2ULN: 3.31
FVC CHG: -5 %
FVC LLN: 1.58
FVC PRE REF: 81.8 %
FVC REF: 2.24
FVC VOL CHG: -0.09
FVCZSCORE: -1
ICN2REF: 1.46
IVC PRE: 1.75 L (ref 1.58–2.93)
IVC SINGLE BREATH LLN: 1.58
IVC SINGLE BREATH PRE REF: 78.2 %
IVC SINGLE BREATH REF: 2.24
IVCSINGLEBREATHULN: 2.93
LLN IC N2: -16448.54
PEF LLN: 2.88
PEF PRE REF: 133.4 %
PEF REF: 4.38
PHYSICIAN COMMENT: ABNORMAL
POST FEF 25 75: 1.21 L/S (ref 0.77–3.57)
POST FET 100: 6.16 SEC
POST FEV1 FVC: 79.22 % (ref 63.16–90.3)
POST FEV1: 1.38 L (ref 1.21–2.21)
POST FEV5: 1.12 L (ref 0.54–2.25)
POST FVC: 1.74 L (ref 1.58–2.93)
POST PEF: 4.98 L/S (ref 2.88–5.89)
PRE DLCO: 9.14 ML/(MIN*MMHG) (ref 11.9–23.36)
PRE FEF 25 75: 1.42 L/S (ref 0.77–3.57)
PRE FET 100: 6.17 SEC
PRE FEV05 REF: 89.6 %
PRE FEV1 FVC: 79.45 % (ref 63.16–90.3)
PRE FEV1: 1.45 L (ref 1.21–2.21)
PRE FEV5: 1.25 L (ref 0.54–2.25)
PRE FRC N2: 1.7 L (ref 1.67–3.31)
PRE FVC: 1.83 L (ref 1.58–2.93)
PRE IC N2: 1.6 L (ref -16448.54–16451.46)
PRE PEF: 5.85 L/S (ref 2.88–5.89)
PRE REF IC N2: 109.1 %
RVN2 LLN: 1.43
RVN2 PRE REF: 73.3 %
RVN2 PRE: 1.47 L (ref 1.43–2.58)
RVN2 REF: 2.01
RVN2TLCN2 LLN: 35.89
RVN2TLCN2 PRE REF: 98 %
RVN2TLCN2 PRE: 44.58 % (ref 35.89–55.07)
RVN2TLCN2 REF: 45.48
RVN2TLCN2ULN: 55.07
RVN2ULN: 2.58
TLCN2 LLN: 3.28
TLCN2 PRE REF: 77.3 %
TLCN2 PRE: 3.3 L (ref 3.28–5.26)
TLCN2 REF: 4.27
TLCN2ULN: 5.26
TLCN2ZSCORE: -1.62
ULN IC N2: ABNORMAL
VA PRE: 2.74 L (ref 4.12–4.12)
VA SINGLE BREATH LLN: 4.12
VA SINGLE BREATH PRE REF: 66.6 %
VA SINGLE BREATH REF: 4.12
VASINGLEBREATHULN: 4.12
VCMAXN2 LLN: 1.58
VCMAXN2 PRE REF: 81.8 %
VCMAXN2 PRE: 1.83 L (ref 1.58–2.93)
VCMAXN2 REF: 2.24
VCMAXN2ULN: 2.93

## 2024-09-23 PROCEDURE — 3079F DIAST BP 80-89 MM HG: CPT | Mod: CPTII,S$GLB,, | Performed by: INTERNAL MEDICINE

## 2024-09-23 PROCEDURE — 3288F FALL RISK ASSESSMENT DOCD: CPT | Mod: CPTII,S$GLB,, | Performed by: INTERNAL MEDICINE

## 2024-09-23 PROCEDURE — 1101F PT FALLS ASSESS-DOCD LE1/YR: CPT | Mod: CPTII,S$GLB,, | Performed by: INTERNAL MEDICINE

## 2024-09-23 PROCEDURE — 94729 DIFFUSING CAPACITY: CPT | Mod: S$GLB,,, | Performed by: INTERNAL MEDICINE

## 2024-09-23 PROCEDURE — 1126F AMNT PAIN NOTED NONE PRSNT: CPT | Mod: CPTII,S$GLB,, | Performed by: INTERNAL MEDICINE

## 2024-09-23 PROCEDURE — 1159F MED LIST DOCD IN RCRD: CPT | Mod: CPTII,S$GLB,, | Performed by: INTERNAL MEDICINE

## 2024-09-23 PROCEDURE — 94060 EVALUATION OF WHEEZING: CPT | Mod: S$GLB,,, | Performed by: INTERNAL MEDICINE

## 2024-09-23 PROCEDURE — 99999 PR PBB SHADOW E&M-EST. PATIENT-LVL IV: CPT | Mod: PBBFAC,,, | Performed by: INTERNAL MEDICINE

## 2024-09-23 PROCEDURE — 99214 OFFICE O/P EST MOD 30 MIN: CPT | Mod: 25,S$GLB,, | Performed by: INTERNAL MEDICINE

## 2024-09-23 PROCEDURE — 3075F SYST BP GE 130 - 139MM HG: CPT | Mod: CPTII,S$GLB,, | Performed by: INTERNAL MEDICINE

## 2024-09-23 PROCEDURE — 94727 GAS DIL/WSHOT DETER LNG VOL: CPT | Mod: S$GLB,,, | Performed by: INTERNAL MEDICINE

## 2024-09-23 RX ORDER — PREDNISONE 20 MG/1
40 TABLET ORAL DAILY
Qty: 10 TABLET | Refills: 0 | Status: SHIPPED | OUTPATIENT
Start: 2024-09-23 | End: 2024-09-28

## 2024-09-23 RX ORDER — LEVOFLOXACIN 750 MG/1
750 TABLET ORAL DAILY
Qty: 5 TABLET | Refills: 0 | Status: SHIPPED | OUTPATIENT
Start: 2024-09-23 | End: 2024-09-28

## 2024-09-23 NOTE — ASSESSMENT & PLAN NOTE
New symptoms of chest tightness that takes hours to resolve - concern for ongoing GERD  GI referral placed

## 2024-09-23 NOTE — TELEPHONE ENCOUNTER
----- Message from Nelsy Rodriguez sent at 9/23/2024 12:03 PM CDT -----  Consult/Advisory    Name Of Caller:Jocelyne       Contact Preference:[phone]    Nature of call: Pt called stating she is running late she is not sure how late

## 2024-09-23 NOTE — ASSESSMENT & PLAN NOTE
- repeating echo and PFTs have been stable  - continue breathing treatments   - continue RA treatment

## 2024-09-23 NOTE — PROGRESS NOTES
Subjective:     Reason for visit: shortness of breath     Patient ID:  Jocelyne Duncan is a 79 y.o. female    Interval History 9/23/24  Worsening shortness of breath, coughing spells, and mucous production. Has not had to go to the ER but has been feeling more SOB with exertion and having productive mucous.     Initial History:  76 yo with RA and previously told she had asthma that presents for evaluation of her shortness of breath. Her symptoms have been present for some time and she was recently started on breo which seems to help a lot. She has issues with exertion that cause shortness of breath but has also started noticing some shortness of breath at rest. IT has improved since starting the breo but is still present. She also notes a strange tightness in her chest that is intermittent and typically gets better without any interventions.     Interval History 10/19/22:   Ms. Casanova is doing ok and feels that the breo is definitely helping her asthma symptoms. She does still have occasional episodes of difficulty catching her breath when laying on her left side at times. She does overall feel better than she had before. She does have a new cough for the last few days that started with the weather change.     History 1/24/24:   Ms. Duncan has had a hard time getting in to see us and has been hospitalized twice in the last 6 months. She was hospitalized in November at [facility] with what sounds like angioedema and required intubation for 3 days and since then her throat has been significantly sore and her shortness of breath has been much worse. She was also seen recently with significant shortness of breath and her  is also with her and worried that no one will help her now that she is older.     Interval History 5/2024  Ms. Duncan reports feeling much better than she has in quite some time although she still does have some episodes of shortness of breath.  She is following up with ENT, and allergy after her  intubation from angioedema back in November.  She is continuing on her current treatments and we will plan to follow up with Cardiology soon.    Objective:     Vitals:    09/23/24 1402   BP: 130/82   BP Location: Right arm   Patient Position: Sitting   BP Method: Medium (Manual)   Pulse: 70   SpO2: 97%   Weight: 63 kg (139 lb)   Height: 5' (1.524 m)         Physical Exam  Vitals and nursing note reviewed.   Constitutional:       General: She is not in acute distress.     Appearance: She is not diaphoretic.   HENT:      Head: Normocephalic and atraumatic.      Right Ear: External ear normal.      Left Ear: External ear normal.   Eyes:      Conjunctiva/sclera: Conjunctivae normal.      Pupils: Pupils are equal, round, and reactive to light.   Neck:      Trachea: No tracheal deviation.   Cardiovascular:      Rate and Rhythm: Normal rate and regular rhythm.      Heart sounds: Normal heart sounds. No murmur heard.  Pulmonary:      Effort: Pulmonary effort is normal. No respiratory distress.      Breath sounds: No stridor. No wheezing, rhonchi or rales.      Comments: Conversational dyspnea   Abdominal:      General: Bowel sounds are normal. There is no distension.      Palpations: Abdomen is soft.      Tenderness: There is no abdominal tenderness.   Musculoskeletal:         General: Deformity (arthritis of both hands) present. Normal range of motion.      Cervical back: Normal range of motion and neck supple.   Skin:     General: Skin is warm and dry.      Findings: No erythema.   Neurological:      Mental Status: She is alert and oriented to person, place, and time.      Gait: Gait is intact.   Psychiatric:         Mood and Affect: Mood and affect normal.         Cognition and Memory: Memory normal.         Judgment: Judgment normal.          Discussed CT and echo findings with patient and her .       Results for orders placed during the hospital encounter of 09/02/22    Echo    Interpretation Summary  · The left  ventricle is normal in size with normal systolic function.  · The estimated ejection fraction is 60%.  · Moderate left atrial enlargement.  · Left ventricular diastolic dysfunction.  · Normal right ventricular size with normal right ventricular systolic function.  · Mild tricuspid regurgitation.  · Normal central venous pressure (3 mmHg).  · The estimated PA systolic pressure is 32 mmHg.  · Trivial pericardial effusion. Under the atria.      Assessment:     1. Interstitial pulmonary disease, unspecified  CT Chest Without Contrast    Ambulatory referral/consult to Gastroenterology      2. Gastroesophageal reflux disease without esophagitis        3. Shortness of breath        4. Restrictive lung disease        5. Reactive airways dysfunction syndrome        6. Immunosuppressed status            Current Outpatient Medications   Medication Instructions    acetaminophen (TYLENOL) 325 MG tablet Take 1 tablet by mouth every morning and 1 to 2 tablets every night at bedtime.    albuterol (PROVENTIL/VENTOLIN HFA) 90 mcg/actuation inhaler 2 puffs, Inhalation, Every 6 hours PRN    amLODIPine (NORVASC) 5 mg, Oral, Daily    aspirin (ECOTRIN) 81 MG EC tablet 1 tablet, Oral, Daily    atorvastatin (LIPITOR) 20 mg, Oral, Daily    BREO ELLIPTA 200-25 mcg/dose DsDv diskus inhaler 1 puff, Inhalation, Daily    calcium carbonate/vitamin D3 (VITAMIN D-3 ORAL) 1 capsule, Oral, Daily    diclofenac sodium (VOLTAREN) 1 % Gel Apply 2 g topically 1 to 3 times daily as needed for pain.    EPINEPHrine (EPIPEN 2-CRYSTAL) 0.3 mg, Intramuscular, Once    esomeprazole (NEXIUM) 40 mg, Oral, Daily    fluticasone propionate (FLONASE) 50 mcg, Each Nostril, 2 times daily PRN    furosemide (LASIX) 20 mg, Oral, Every other day    gabapentin (NEURONTIN) 100 mg, Oral, 3 times daily    guaiFENesin (MUCINEX) 1,200 mg, Oral, 2 times daily    leflunomide (ARAVA) 20 mg, Oral, Daily    levocetirizine (XYZAL) 5 mg, Oral, Nightly    levoFLOXacin (LEVAQUIN) 750 mg, Oral,  Daily    lisinopril-hydrochlorothiazide (PRINZIDE,ZESTORETIC) 20-12.5 mg per tablet 1 tablet, Oral, Daily    meloxicam (MOBIC) 15 mg, Oral, Daily    methotrexate 2.5 MG Tab Every 7 days    nystatin (MYCOSTATIN) cream Topical (Top), As needed (PRN)    predniSONE (DELTASONE) 2 mg, Oral, Every morning    predniSONE (DELTASONE) 40 mg, Oral, Daily    traMADoL (ULTRAM-ER) 100 mg, Oral, Daily      Jocelyne IKarrie Duncan had no medications administered during this visit.     Orders Placed This Encounter   Procedures    CT Chest Without Contrast     Standing Status:   Future     Standing Expiration Date:   9/23/2025     Order Specific Question:   May the Radiologist modify the order per protocol to meet the clinical needs of the patient?     Answer:   Yes    Ambulatory referral/consult to Gastroenterology     Standing Status:   Future     Standing Expiration Date:   10/23/2025     Referral Priority:   Urgent     Referral Type:   Consultation     Referral Reason:   Specialty Services Required     Requested Specialty:   Gastroenterology     Number of Visits Requested:   1        Plan:       Problem List Items Addressed This Visit          Pulmonary    Shortness of breath    Current Assessment & Plan     Previously told that she has asthma and is being treated with breo which is helping  - continue using breo and albuterol since it is helping  - restriction on PFTs but stable since 2022  - CT chest with no further evidence of ILD or any progressive disorder            Restrictive lung disease    Current Assessment & Plan     - repeating echo and PFTs have been stable  - continue breathing treatments   - continue RA treatment            Reactive airways dysfunction syndrome    Current Assessment & Plan     Worsening SOB and mucous production with increased allergic symptoms  - prednisone for 5 days and levaquin for 5 days            Immunology/Multi System    Immunosuppressed status (Chronic)    Current Assessment & Plan     RA  treatment  At risk for atypical infections             GI    GERD (gastroesophageal reflux disease)    Current Assessment & Plan     New symptoms of chest tightness that takes hours to resolve - concern for ongoing GERD  GI referral placed           Other Visit Diagnoses       Interstitial pulmonary disease, unspecified    -  Primary    Relevant Orders    CT Chest Without Contrast    Ambulatory referral/consult to Gastroenterology           Emily Coffman M.D.  Pulmonary/Critical Care

## 2024-09-23 NOTE — ASSESSMENT & PLAN NOTE
Worsening SOB and mucous production with increased allergic symptoms  - prednisone for 5 days and levaquin for 5 days

## 2024-09-23 NOTE — ASSESSMENT & PLAN NOTE
Previously told that she has asthma and is being treated with breo which is helping  - continue using breo and albuterol since it is helping  - restriction on PFTs but stable since 2022  - CT chest with no further evidence of ILD or any progressive disorder

## 2024-10-28 ENCOUNTER — OFFICE VISIT (OUTPATIENT)
Dept: CARDIOLOGY | Facility: CLINIC | Age: 79
End: 2024-10-28
Payer: MEDICARE

## 2024-10-28 ENCOUNTER — E-CONSULT (OUTPATIENT)
Dept: GASTROENTEROLOGY | Facility: CLINIC | Age: 79
End: 2024-10-28
Payer: MEDICARE

## 2024-10-28 ENCOUNTER — TELEPHONE (OUTPATIENT)
Dept: GASTROENTEROLOGY | Facility: CLINIC | Age: 79
End: 2024-10-28

## 2024-10-28 ENCOUNTER — TELEPHONE (OUTPATIENT)
Dept: PULMONOLOGY | Facility: CLINIC | Age: 79
End: 2024-10-28
Payer: MEDICARE

## 2024-10-28 VITALS
WEIGHT: 141.13 LBS | SYSTOLIC BLOOD PRESSURE: 171 MMHG | HEIGHT: 60 IN | BODY MASS INDEX: 27.71 KG/M2 | HEART RATE: 73 BPM | DIASTOLIC BLOOD PRESSURE: 77 MMHG

## 2024-10-28 DIAGNOSIS — R06.02 SHORTNESS OF BREATH: ICD-10-CM

## 2024-10-28 DIAGNOSIS — I27.20 PULMONARY HYPERTENSION: ICD-10-CM

## 2024-10-28 DIAGNOSIS — E78.49 OTHER HYPERLIPIDEMIA: ICD-10-CM

## 2024-10-28 DIAGNOSIS — I49.1 PREMATURE ATRIAL COMPLEXES: ICD-10-CM

## 2024-10-28 DIAGNOSIS — I10 ESSENTIAL HYPERTENSION: Chronic | ICD-10-CM

## 2024-10-28 DIAGNOSIS — I10 PRIMARY HYPERTENSION: Primary | ICD-10-CM

## 2024-10-28 DIAGNOSIS — K52.9 CHRONIC DIARRHEA: Primary | ICD-10-CM

## 2024-10-28 DIAGNOSIS — R19.7 DIARRHEA, UNSPECIFIED TYPE: ICD-10-CM

## 2024-10-28 PROCEDURE — 99999 PR PBB SHADOW E&M-EST. PATIENT-LVL IV: CPT | Mod: PBBFAC,,, | Performed by: INTERNAL MEDICINE

## 2024-10-28 PROCEDURE — 3078F DIAST BP <80 MM HG: CPT | Mod: CPTII,S$GLB,, | Performed by: INTERNAL MEDICINE

## 2024-10-28 PROCEDURE — 99499 UNLISTED E&M SERVICE: CPT | Mod: S$GLB,,, | Performed by: INTERNAL MEDICINE

## 2024-10-28 PROCEDURE — 1125F AMNT PAIN NOTED PAIN PRSNT: CPT | Mod: CPTII,S$GLB,, | Performed by: INTERNAL MEDICINE

## 2024-10-28 PROCEDURE — 1159F MED LIST DOCD IN RCRD: CPT | Mod: CPTII,S$GLB,, | Performed by: INTERNAL MEDICINE

## 2024-10-28 PROCEDURE — 99214 OFFICE O/P EST MOD 30 MIN: CPT | Mod: S$GLB,,, | Performed by: INTERNAL MEDICINE

## 2024-10-28 PROCEDURE — 3077F SYST BP >= 140 MM HG: CPT | Mod: CPTII,S$GLB,, | Performed by: INTERNAL MEDICINE

## 2024-10-28 RX ORDER — HYDROCHLOROTHIAZIDE 12.5 MG/1
12.5 TABLET ORAL DAILY
Qty: 90 TABLET | Refills: 3 | Status: SHIPPED | OUTPATIENT
Start: 2024-10-28 | End: 2025-10-28

## 2024-10-28 RX ORDER — AMLODIPINE BESYLATE 10 MG/1
10 TABLET ORAL DAILY
Start: 2024-10-28 | End: 2025-10-28

## 2024-10-28 RX ORDER — LOSARTAN POTASSIUM 50 MG/1
50 TABLET ORAL DAILY
COMMUNITY

## 2024-11-11 ENCOUNTER — LAB VISIT (OUTPATIENT)
Dept: LAB | Facility: HOSPITAL | Age: 79
End: 2024-11-11
Attending: INTERNAL MEDICINE
Payer: MEDICARE

## 2024-11-11 DIAGNOSIS — E78.49 OTHER HYPERLIPIDEMIA: ICD-10-CM

## 2024-11-11 DIAGNOSIS — I10 PRIMARY HYPERTENSION: ICD-10-CM

## 2024-11-11 LAB
ANION GAP SERPL CALC-SCNC: 11 MMOL/L (ref 8–16)
BUN SERPL-MCNC: 17 MG/DL (ref 8–23)
CALCIUM SERPL-MCNC: 9 MG/DL (ref 8.7–10.5)
CHLORIDE SERPL-SCNC: 106 MMOL/L (ref 95–110)
CHOLEST SERPL-MCNC: 171 MG/DL (ref 120–199)
CHOLEST/HDLC SERPL: 2.4 {RATIO} (ref 2–5)
CO2 SERPL-SCNC: 27 MMOL/L (ref 23–29)
CREAT SERPL-MCNC: 0.8 MG/DL (ref 0.5–1.4)
EST. GFR  (NO RACE VARIABLE): >60 ML/MIN/1.73 M^2
GLUCOSE SERPL-MCNC: 97 MG/DL (ref 70–110)
HDLC SERPL-MCNC: 70 MG/DL (ref 40–75)
HDLC SERPL: 40.9 % (ref 20–50)
LDLC SERPL CALC-MCNC: 74.6 MG/DL (ref 63–159)
NONHDLC SERPL-MCNC: 101 MG/DL
POTASSIUM SERPL-SCNC: 3.3 MMOL/L (ref 3.5–5.1)
SODIUM SERPL-SCNC: 144 MMOL/L (ref 136–145)
TRIGL SERPL-MCNC: 132 MG/DL (ref 30–150)

## 2024-11-11 PROCEDURE — 80048 BASIC METABOLIC PNL TOTAL CA: CPT | Performed by: INTERNAL MEDICINE

## 2024-11-11 PROCEDURE — 80061 LIPID PANEL: CPT | Performed by: INTERNAL MEDICINE

## 2024-11-11 PROCEDURE — 36415 COLL VENOUS BLD VENIPUNCTURE: CPT | Performed by: INTERNAL MEDICINE

## 2024-12-02 NOTE — PROGRESS NOTES
Subjective:   Chief Complaint: Hypertension and Hyperlipidemia  Last Clinic Visit: 4/20/2023      History of Present Illness: Jocelyne Duncan is a 79 y.o. lady [place]-speaking from [place], but speaking English fluently, with hypertension, hyperlipidemia, rheumatoid arthritis on DMARD/steroid, in PACs, who presents to follow-up with cardiology.    Interval History:  She had an echocardiogram obtained 8 months ago, which was relatively stable, normal EF, dilated atrium with some diastolic dysfunction.  PA pressure is mildly elevated in the low 40s., mildly dilated LV, mild aortic stenosis.  Blood pressure elevated today, but does have intermittent white coat hypertension.  Main complaints today is that of persistent diarrhea, throughout the day several bowel movements ongoing for several months.  Possible melena unclear, no significant weight loss.  She also has had ongoing GERD, lasting for several hours and not resolving.  Referral to GI placed back in September by Dr. Coffman and she has not yet followed up on it.    4/20/2023  For her shortness of breath we obtained PFTs which showed only mild restriction and mildly decreased DLCO.  Echocardiogram without significantly elevated PA pressures, no significant systolic or diastolic dysfunction. Ultimately no clear etiology of shortness of breath. We prescribed some Breo with slight help.  Symptoms overall stable in the course of the past year.  Checking blood pressure at home 110/73.  Denies any lightheadedness.  Continues to have significant polyarthritis secondary to rheumatoid and following with rheumatology for this.  Comes into clinic today in wheelchair accompanied by . She also reports some spontaneous ecchymoses in her legs.     2/28/2022  We recommended PFTs when we saw her last given methotrexate use and rheumatoid arthritis diagnosis, but not yet obtained.  She was minimally symptomatic from her PACs at that time, not interested in therapy.   Does endorse some mild lower extremity edema.  We refilled her inhaled corticosteroid last time which she has been using, reports that her shortness of breath has worsened some and ask about increasing the medication, but she has not yet seen anyone in the pulmonary department, or addressed with PCP.  Does endorse some difficulty sleeping, no profound weight gain.  Outside rheumatologist.  Recent labs with normal renal function, grossly normal electrolytes, and no significant anemia.    5/24/2021  When we saw her last she was having significant shortness of breath, multifactorial etiology, we obtained an echocardiogram, she had preserved EF, some aortic sclerosis,but no significant stenosis.  She had significant PACs, but was not interested in management at that time for these.  With her history of rheumatoid arthritis, and methotrexate use, recommended discussing PFTs with Rheumatology, not yet performed.    11/9/2020    She recently saw her PCP who noted irregular heartbeats, and referred to Cardiology.  She has a history of PACs longstanding, extensive workup at outside hospital, without any evidence of AFib a flutter, and no evidence of congestive heart failure or underlying structural heart disease.  She saw Cardiology here last year for similar issues.  She denies any change in her palpitations, irregular, at rest, no associated chest pain or shortness of breath with palpitations, typically happen once a week, and resolved spontaneously.  No sustained periods of tachycardia, no focal neurological deficits.  She has a new complaint of worsening shortness of breath over the course the past year PCP is also ordered a chest x-ray, occasional wheezing, no clear orthopnea, no clear weight gain, no PND.  She does however have some difficulty with sleeping.  She reports last echocardiogram was many years ago, unclear if was done at outside facility.   is from [place], he also would like to be referred to us  for issues it sounds like with atrial fibrillation and DVTs.  She is not currently on Lasix.  Questionable diagnosis of COPD.  Palpitations are not severe enough to where she would want any medication or procedural therapy for them.    Dx:  Hypertension  Hyperlipidemia  Osteoarthritis  Rheumatoid arthritis on DMARD/steroid  PACs    Medications:  Outpatient Encounter Medications as of 10/28/2024   Medication Sig Dispense Refill    acetaminophen (TYLENOL) 325 MG tablet Take 1 tablet by mouth every morning and 1 to 2 tablets every night at bedtime.      aspirin (ECOTRIN) 81 MG EC tablet Take 1 tablet by mouth once daily.      atorvastatin (LIPITOR) 20 MG tablet Take 20 mg by mouth once daily.       BREO ELLIPTA 200-25 mcg/dose DsDv diskus inhaler Inhale 1 puff into the lungs once daily.      calcium carbonate/vitamin D3 (VITAMIN D-3 ORAL) Take 1 capsule by mouth once daily.      diclofenac sodium (VOLTAREN) 1 % Gel Apply 2 g topically 1 to 3 times daily as needed for pain.      esomeprazole (NEXIUM) 40 MG capsule Take 40 mg by mouth once daily.      fluticasone propionate (FLONASE) 50 mcg/actuation nasal spray 1 spray (50 mcg total) by Each Nostril route 2 (two) times daily as needed for Rhinitis. 15 g 0    furosemide (LASIX) 20 MG tablet Take 20 mg by mouth every other day.      gabapentin (NEURONTIN) 100 MG capsule Take 100 mg by mouth 3 (three) times daily.      predniSONE (DELTASONE) 1 MG tablet Take 2 mg by mouth every morning.      [DISCONTINUED] amLODIPine (NORVASC) 5 MG tablet Take 10 mg by mouth once daily.      albuterol (PROVENTIL/VENTOLIN HFA) 90 mcg/actuation inhaler Inhale 2 puffs into the lungs every 6 (six) hours as needed for Wheezing or Shortness of Breath. (Patient not taking: Reported on 10/28/2024) 18 g 11    amLODIPine (NORVASC) 10 MG tablet Take 1 tablet (10 mg total) by mouth once daily.      EPINEPHrine (EPIPEN 2-CRYSTAL) 0.3 mg/0.3 mL AtIn Inject 0.3 mLs (0.3 mg total) into the muscle once. for 1  dose 0.3 mL 1    hydroCHLOROthiazide (HYDRODIURIL) 12.5 MG Tab Take 1 tablet (12.5 mg total) by mouth once daily. 90 tablet 3    leflunomide (ARAVA) 20 MG Tab Take 20 mg by mouth once daily.      losartan (COZAAR) 50 MG tablet Take 50 mg by mouth once daily.      nystatin (MYCOSTATIN) cream Apply topically as needed.      [DISCONTINUED] guaiFENesin (MUCINEX) 600 mg 12 hr tablet Take 2 tablets (1,200 mg total) by mouth 2 (two) times daily. (Patient not taking: Reported on 10/28/2024) 120 tablet 11    [DISCONTINUED] levocetirizine (XYZAL) 5 MG tablet Take 1 tablet (5 mg total) by mouth every evening. (Patient not taking: Reported on 10/28/2024) 30 tablet 11    [DISCONTINUED] lisinopril-hydrochlorothiazide (PRINZIDE,ZESTORETIC) 20-12.5 mg per tablet Take 1 tablet by mouth once daily.  (Patient not taking: Reported on 10/28/2024)      [DISCONTINUED] meloxicam (MOBIC) 15 MG tablet Take 15 mg by mouth once daily. (Patient not taking: Reported on 10/28/2024)      [DISCONTINUED] methotrexate 2.5 MG Tab every 7 days.  (Patient not taking: Reported on 10/28/2024)      [DISCONTINUED] traMADoL (ULTRAM-ER) 100 MG Tb24 Take 100 mg by mouth once daily. (Patient not taking: Reported on 10/28/2024)       No facility-administered encounter medications on file as of 10/28/2024.     Social History:  Jocelyne reports that she has never smoked. She has never been exposed to tobacco smoke. She has never used smokeless tobacco. She reports current alcohol use. She reports that she does not use drugs.    Objective:   BP (!) 171/77 (BP Location: Left arm, Patient Position: Sitting)   Pulse 73   Ht 5' (1.524 m)   Wt 64 kg (141 lb 1.5 oz)   LMP  (LMP Unknown)   BMI 27.56 kg/m²     Physical Exam  Constitutional:       General: She is not in acute distress.     Appearance: She is not diaphoretic.   HENT:      Head: Normocephalic and atraumatic.      Mouth/Throat:      Pharynx: No oropharyngeal exudate.   Eyes:      General: No scleral  icterus.  Neck:      Thyroid: No thyromegaly.      Vascular: No JVD.      Trachea: No tracheal deviation.   Cardiovascular:      Rate and Rhythm: Normal rate.      Heart sounds: Murmur (One/6 systolic) heard.      No friction rub. No gallop.      Comments: Occasional ectopic beat, otherwise regular R-R interval.  Pulmonary:      Effort: Pulmonary effort is normal. No respiratory distress.      Breath sounds: Normal breath sounds. No wheezing or rales.   Chest:      Chest wall: No tenderness.   Abdominal:      General: There is no distension.      Palpations: Abdomen is soft.      Tenderness: There is no abdominal tenderness. There is no guarding or rebound.   Musculoskeletal:         General: Normal range of motion.   Skin:     General: Skin is warm and dry.      Findings: No erythema.   Neurological:      Mental Status: She is alert and oriented to person, place, and time.   Psychiatric:         Mood and Affect: Affect normal.       EKG:  My independent visualization of most recent EKG is normal sinus rhythm with PACs    TTE:  04/30/2024    Left Ventricle: The left ventricle is normal in size. Mildly increased ventricular mass. Mildly increased wall thickness. There is mild eccentric hypertrophy. There is low normal systolic function with a visually estimated ejection fraction of 50 - 55%. Ejection fraction by visual approximation is 53%. There is normal diastolic function.    Right Ventricle: Normal right ventricular cavity size. Wall thickness is normal. Systolic function is normal.    Left Atrium: Left atrium is severely dilated.    Aortic Valve: There is mild aortic valve sclerosis. There is mild annular calcification present. There is mild stenosis. Aortic valve area by VTI is 1.65 cm². Aortic valve peak velocity is 2.46 m/s. Mean gradient is 13 mmHg. The dimensionless index is 0.52.    Mitral Valve: There is mild regurgitation.    Tricuspid Valve: There is mild regurgitation.    Pulmonary Artery: There is  mild pulmonary hypertension. The estimated pulmonary artery systolic pressure is 42 mmHg.    IVC/SVC: Normal venous pressure at 3 mmHg.     09/02/2022  The left ventricle is normal in size with normal systolic function.  The estimated ejection fraction is 60%.  Moderate left atrial enlargement.  Left ventricular diastolic dysfunction.  Normal right ventricular size with normal right ventricular systolic function.  Mild tricuspid regurgitation.  Normal central venous pressure (3 mmHg).  The estimated PA systolic pressure is 32 mmHg.  Trivial pericardial effusion. Under the atria.     12/11/2020  The left ventricle is normal in size with normal systolic function. The estimated ejection fraction is 65%.  Grade I diastolic dysfunction.  Mild right ventricular enlargement with low normal right ventricular systolic function.  Aortic annular calcification. Aortic sclerosis without stenosis.  Mild to moderate tricuspid regurgitation.  The estimated PA systolic pressure is 39 mmHg.  Normal central venous pressure (3 mmHg).     Lipids:  Recent Labs   Lab 11/11/24  1031   LDL Cholesterol 74.6   HDL 70   Cholesterol 171      Renal:  Recent Labs   Lab 11/11/24  1031   Creatinine 0.8   Potassium 3.3 L   CO2 27   BUN 17     Liver:  Recent Labs   Lab 01/10/24  1558   AST 16   ALT 8 L     PFTs   3/14/22  Spirometry is normal. Lung volumes show mild restriction is present. DLCO is mildly decreased.    Assessment:     1. Primary hypertension    2. Diarrhea, unspecified type    3. Other hyperlipidemia    4. Essential hypertension    5. Shortness of breath    6. Pulmonary hypertension    7. Premature atrial complexes      Plan:   1. Primary hypertension (Primary)  Blood pressure above goal, adding low-dose hydrochlorothiazide, with follow-up BMP in 2 weeks will continue to monitor closely continue amlodipine.  - amLODIPine (NORVASC) 10 MG tablet; Take 1 tablet (10 mg total) by mouth once daily.  - hydroCHLOROthiazide (HYDRODIURIL) 12.5  MG Tab; Take 1 tablet (12.5 mg total) by mouth once daily.  Dispense: 90 tablet; Refill: 3  - Basic metabolic panel; Future    2. Diarrhea, unspecified type  Concerning persistent symptoms and problems, GERD, as well as diarrhea, will discuss with E consult for GI  - E-Consult to Gastroenterology    3. Other hyperlipidemia  LDL most recent with [facility] near goal continue to monitor closely  - Lipid Panel; Future    4. Essential hypertension  As above    5. Shortness of breath  Multifactorial relatively stable    6. Pulmonary hypertension  Mildly elevated on most recent echocardiogram will continue to monitor every 1-2 years stable    7. Premature atrial complexes      Follow up in 1 yr      Parish Martell MD [facility]

## 2025-02-12 ENCOUNTER — OFFICE VISIT (OUTPATIENT)
Dept: OTOLARYNGOLOGY | Facility: CLINIC | Age: 80
End: 2025-02-12
Payer: MEDICARE

## 2025-02-12 VITALS — SYSTOLIC BLOOD PRESSURE: 151 MMHG | HEART RATE: 75 BPM | DIASTOLIC BLOOD PRESSURE: 85 MMHG

## 2025-02-12 DIAGNOSIS — J04.0 LARYNGITIS: ICD-10-CM

## 2025-02-12 DIAGNOSIS — R49.0 DYSPHONIA: Primary | ICD-10-CM

## 2025-02-12 PROCEDURE — 99213 OFFICE O/P EST LOW 20 MIN: CPT | Mod: 25,S$GLB,, | Performed by: OTOLARYNGOLOGY

## 2025-02-12 PROCEDURE — 1160F RVW MEDS BY RX/DR IN RCRD: CPT | Mod: CPTII,S$GLB,, | Performed by: OTOLARYNGOLOGY

## 2025-02-12 PROCEDURE — 99999 PR PBB SHADOW E&M-EST. PATIENT-LVL III: CPT | Mod: PBBFAC,,, | Performed by: OTOLARYNGOLOGY

## 2025-02-12 PROCEDURE — 3079F DIAST BP 80-89 MM HG: CPT | Mod: CPTII,S$GLB,, | Performed by: OTOLARYNGOLOGY

## 2025-02-12 PROCEDURE — 1159F MED LIST DOCD IN RCRD: CPT | Mod: CPTII,S$GLB,, | Performed by: OTOLARYNGOLOGY

## 2025-02-12 PROCEDURE — 31579 LARYNGOSCOPY TELESCOPIC: CPT | Mod: S$GLB,,, | Performed by: OTOLARYNGOLOGY

## 2025-02-12 PROCEDURE — 1126F AMNT PAIN NOTED NONE PRSNT: CPT | Mod: CPTII,S$GLB,, | Performed by: OTOLARYNGOLOGY

## 2025-02-12 PROCEDURE — 3077F SYST BP >= 140 MM HG: CPT | Mod: CPTII,S$GLB,, | Performed by: OTOLARYNGOLOGY

## 2025-02-12 RX ORDER — DOXAZOSIN 1 MG/1
1 TABLET ORAL DAILY
COMMUNITY
Start: 2025-01-29

## 2025-02-12 NOTE — PROGRESS NOTES
[facility]  Department of Otorhinolaryngology and Communication Sciences    Jocelyne Duncan is a 79 y.o. female who [facility] the [facility] on referral from Dr. Ez Kaplan regarding vocal cord issues.    She complains of difficulty with her voice following intubation about 2 years ago.voice cuts out on her from time to time. Had been intubated for about 2 weeks (angioedema event).  She denies dysphagia, throat pain, odynophagia, otalgia, hemoptysis, hematemesis, neck mass.  She is a lifetime nonsmoker.    Past Medical History  She has a past medical history of Allergy, Arthritis, GERD (gastroesophageal reflux disease), HTN (hypertension), Premature atrial complexes, and Rheumatoid arthritis.    Past Surgical History  She has a past surgical history that includes Lumbar spine surgery and Partial hysterectomy.    Family History  Her family history includes Asthma in her mother.    Social History  She reports that she has never smoked. She has never been exposed to tobacco smoke. She has never used smokeless tobacco. She reports current alcohol use. She reports that she does not use drugs.    Allergies  She is allergic to iodine, lisinopril, adhesive, and penicillins.    Medications  She has a current medication list which includes the following prescription(s): doxazosin, acetaminophen, albuterol, amlodipine, atorvastatin, breo ellipta, calcium carbonate/vitamin d3, diclofenac sodium, epinephrine, esomeprazole, fluticasone propionate, furosemide, gabapentin, hydrochlorothiazide, leflunomide, losartan, nystatin, and prednisone.    Review of Systems   Constitutional:  Negative for fever.   HENT:  Negative for sore throat.    Eyes:  Negative for visual disturbance.   Respiratory:  Negative for wheezing.    Cardiovascular:  Negative for chest pain.   Gastrointestinal:  Negative for nausea.   Musculoskeletal:  Negative for arthralgias.   Skin:  Negative for rash.   Neurological:  Negative for tremors.    Hematological:  Does not bruise/bleed easily.   Psychiatric/Behavioral:  The patient is not nervous/anxious.           Objective:     VS reviewed     Physical Exam    Constitutional: comfortable, well dressed  Psychiatric: appropriate affect  Respiratory: comfortably breathing, symmetric chest rise, no stridor  Voice:  Mild low-pitched roughness/strain  Cardiovascular: upper extremities non-edematous  Lymphatic: no cervical lymphadenopathy  Neurologic: alert and oriented to time, place, person, and situation; cranial nerves 3-12 grossly intact  Head: normocephalic  Eyes: conjunctivae and sclerae clear  Ears: normal pinnae, normal external auditory canals, tympanic membranes intact  Nose: mucosa pink and noncongested, no masses, no mucopurulence, no polyps  Oral cavity / oropharynx: no mucosal lesions  Neck: soft, full range of motion, laryngotracheal complex palpable with appropriate landmarks, larynx elevates on swallowing  Indirect laryngoscopy: limited due to gag    Procedure  Flexible Laryngeal Videostroboscopy (76712): Laryngeal videostroboscopy is indicated to assess laryngeal vibratory biomechanics and vocal fold oscillation, which cannot be assessed with a plain light examination. This was carried out transnasally with a distal chip videoendoscope. After verbal consent was obtained, the patient was positioned and the nose was topically decongested with 1% phenylephrine and topically anesthetized with 4% lidocaine. The endoscope was passed through the most patent nasal cavity and positioned to image the nasopharynx, larynx, and hypopharynx in detail. The following features were examined: nasopharyngeal, laryngeal, hypopharyngeal masses; velopharyngeal strength, closure, and symmetry of motion; vocal fold range and symmetry of motion; laryngeal mucosal edema, erythema, inflammation, and hydration; salivary pooling; and gross laryngeal sensation. During phonation, the vocal folds were assessed for glottal  closure; mucosal wave; vocal fold lesions; vibratory periodicity, amplitude, and phase symmetry; and vertical height match. The equipment was removed. The patient tolerated the procedure well without complication. All findings were normal except:  - faint bilateral superficial glottic edema, left-greater-than-right  - premature contact, slight over-closure, slightly truncated phonatory segment; primarily pulse register phonation with elevated closed quotient  - mild variable supraglottic hyperfunction      Assessment:     Jocelyne Duncan is a 79 y.o. female with chronic dysphonia following intubation event 2 years ago.  Today's examination demonstrates very mild glottic edema and secondary muscle tension dysphonia       Plan:        I had a discussion with the patient regarding her condition and the further workup and management options.      I demonstrated her examination to her in the video monitor. The patient is reassured that these symptoms do not appear to represent a serious or threatening condition.    She will follow up with me  as needed .    All questions were answered, and the patient is in agreement with the above.     Jesus Grant M.D.  [facility]  Department of Otorhinolaryngology and Communication Sciences

## 2025-02-13 ENCOUNTER — TELEPHONE (OUTPATIENT)
Dept: OTOLARYNGOLOGY | Facility: CLINIC | Age: 80
End: 2025-02-13
Payer: MEDICARE

## 2025-02-20 DIAGNOSIS — R49.0 DYSPHONIA: Primary | ICD-10-CM

## 2025-04-24 ENCOUNTER — HOSPITAL ENCOUNTER (OUTPATIENT)
Dept: RADIOLOGY | Facility: HOSPITAL | Age: 80
Discharge: HOME | End: 2025-04-24
Attending: INTERNAL MEDICINE
Payer: MEDICARE

## 2025-04-24 DIAGNOSIS — J84.9 INTERSTITIAL PULMONARY DISEASE, UNSPECIFIED: ICD-10-CM

## 2025-04-24 PROCEDURE — 71250 CT THORAX DX C-: CPT | Mod: TC

## 2025-05-16 ENCOUNTER — TELEPHONE (OUTPATIENT)
Dept: PULMONOLOGY | Facility: CLINIC | Age: 80
End: 2025-05-16
Payer: MEDICARE

## 2025-05-16 NOTE — TELEPHONE ENCOUNTER
----- Message from Nelsy sent at 5/16/2025  3:07 PM CDT -----  Consult/AdvisoryName Of Caller:Jocelyne Contact Preference:[phone] of call: Pt needs to reschedule with a new provider nothing is showing avail please call to assist

## 2025-05-27 ENCOUNTER — TELEPHONE (OUTPATIENT)
Dept: PULMONOLOGY | Facility: CLINIC | Age: 80
End: 2025-05-27
Payer: MEDICARE

## 2025-05-27 NOTE — TELEPHONE ENCOUNTER
----- Message from Precious sent at 5/27/2025  3:05 PM CDT -----  Regarding: Appt  Pt called in to schedule an appt; no available appts in Epic. Pt is asking for a call back soon to schedule. Thanks. Reason appt not schedule: appt was canceled and was told that provider will no longer be at [facility] and is going to be scheduled with a new provider pt is wanting to see a Dr asap   Patient requesting call back or [facility] msg: Call Back

## 2025-06-02 ENCOUNTER — CLINICAL SUPPORT (OUTPATIENT)
Dept: REHABILITATION | Facility: OTHER | Age: 80
End: 2025-06-02
Attending: ORTHOPAEDIC SURGERY
Payer: MEDICARE

## 2025-06-02 DIAGNOSIS — G89.29 CENTRAL SENSITIZATION TO PAIN: ICD-10-CM

## 2025-06-02 DIAGNOSIS — R29.898 WEAKNESS OF RIGHT SHOULDER: Primary | ICD-10-CM

## 2025-06-02 DIAGNOSIS — M25.611 DECREASED RIGHT SHOULDER RANGE OF MOTION: ICD-10-CM

## 2025-06-02 PROCEDURE — 97163 PT EVAL HIGH COMPLEX 45 MIN: CPT

## 2025-06-02 PROCEDURE — 97110 THERAPEUTIC EXERCISES: CPT

## 2025-06-02 NOTE — PROGRESS NOTES
Outpatient Rehab    Physical Therapy Evaluation    Patient Name: Jocelyne Duncan  MRN: [id number]  YOB: 1945  Encounter Date: 6/2/2025    Therapy Diagnosis:   Encounter Diagnoses   Name Primary?    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   M19.011 (ICD-10-CM) - Primary osteoarthritis, right shoulder   M25.411 (ICD-10-CM) - Effusion of right shoulder joint     Evaluation Date: 6/2/2025  Authorization Period Expiration: 05/06/26  Plan of Care Expiration: 08/29/25  Visit # / Visits authorized: 01/ 01    Progress Note Due: 07/02/25  FOTO: 1/3     Precautions: Standard and Fall  2 back surgeries - per patient, patient unable to clarify type, region  signs of central sensitization     Time In: 1330  Time Out: 1430  Total Appointment Time (timed & untimed codes): 60 minutes    Subjective   Date of onset: chronic /c exacerbation 2 months ago while brushing teeth   History of current condition - Jocelyne reports: R lat shoulder pain /c progression down to wrist and up to neck.  Patient report shoulder discomfort as an intense ache.  Patient also report intermittent cramping in forearm and hand.  Patient report no change in sx AM vs PM.  Pt report intermittent N/T in B hand.  Patient report having  MD appointment to investigate CTS /c possible surgery on L side.    Patient arrives /c 3 wheeled rolling walker noting sig concern for balance and hx of falls (during COVID).    Patient report is unable to raise RUE over head for hair care.  Patient report difficulty /c household chores having  complete cleaning and pain /c cooking tasks.  Patient is R handed.   Patient note  "recent tx of fluid removal from shoulder ended up leading to inc discomfort. Patient report getting cortisone shot from referring provider /c only 5 day relief.  Patient report mild relief /c SalonPas patch.  Patient report note taking pain meds.    Patient report prior PT for LB and shoulder /c mod relief feeling stronger.  However, patient report not being issued /c HEP.    Patient report primary goal is to not have to go to surgery.      Per MD report   Jocelyne is a 80 y.o. Right hand dominant female who presents today for evaluation of right shoulder pain. She is seen today at the referral of Dr. Cooper Daniel. She reports longstanding intermittent right shoulder pain. She injured the right shoulder approximately 4 weeks ago. She was lifting something when she felt a pop. She developed a large effusion and swelling in the right shoulder. The swelling has persisted. Since that time she has difficulty raising the arm, worsening pain and weakness. The pain is worse with lifting and worse at night. She recently started physical therapy. She is right-hand dominant and retired. She comes in today for evaluation. Provider     PETE: chronic insidious /c recent exacerbation /c brushing teeth motion   Any fall:  N  Any popping or clicking or locking:  Y /c motion  Any difficult to elevate shoulder flexion, abd, ER, or IR:  Y  Does pain radiates:  Y cervical   Pain constant or intermittent: constant  Has done any injections:   Y, no relief     Pain:  Current 8/10 /c patch, worst 8/10, best 8/10   Location: right shoulder   Description: intense ache  Aggravating Factors:  reaching -overhead, shoulder movement   Easing Factors: rest "don't move it"     Prior Therapy: PT for LB, shoulder /c mod relief   Social History: [place] [facility] [place] difficulty navigating lives with their spouse  Occupation: retired - [employer]   Prior Level of Function: Kassandra  Current Level of Function: Idalia /c  assistance /c "everything" "       Imaging:  Per MD report:   I have personally reviewed the following imagin2025: I reviewed MRI of the right shoulder from [facility]. MRI of the right shoulder reveals moderate glenohumeral arthritis, a 6 cm full-thickness tear of supraspinatus and infraspinatus with retraction medial to the level of the glenoid and grade 4 atrophy and fatty infiltration. She has a partial tear of the subscapularis. She has a large joint effusion.       Medical History:   Past Medical History:   Diagnosis Date    Allergy     Arthritis     GERD (gastroesophageal reflux disease)     HTN (hypertension)     Premature atrial complexes     Rheumatoid arthritis        Surgical History:   Jocelyne Duncan  has a past surgical history that includes Lumbar spine surgery and Partial hysterectomy.    Medications:   Jocelyne has a current medication list which includes the following prescription(s): acetaminophen, albuterol, amlodipine, atorvastatin, breo ellipta, calcium carbonate/vitamin d3, diclofenac sodium, doxazosin, epinephrine, esomeprazole, fluticasone propionate, furosemide, gabapentin, hydrochlorothiazide, leflunomide, losartan, nystatin, and prednisone.    Allergies:   Review of patient's allergies indicates:   Allergen Reactions    Iodine      Swelling (throat)^  Unsure of what drug/food triggered the reaction      Lisinopril Anaphylaxis    Adhesive Itching    Penicillins           Objective     Full range of motion with a painful arc at 90°, range of motion 120/150/90/30/60. No lag signs  4/5 supraspinatus, 4/5 infraspinatus, 4/5 subscapularis. Negative belly press test  No instability, negative anterior load and shift, negative jerk test  Motor and sensory function are intact in the axillary, radial, ulnar, median nerve dist     Posture Alignment: slouched posture;;   Shoulder rotation at rest: mod B shoulder IR    Sensation: Light touch:    BUE LT intact   C8 myotome intact     DTR:    Bicep R2+ L1+    GAIT DEVIATIONS: Jocelyne   amb /c 3 wheeled rolling walker - fwd to rolling walker    Cervical Range of Motion:    Degrees Pain   Flexion Mod loss      Extension Mod loss      Right Rotation Major loss P!     Left Rotation Mod loss      Right Side Bending Major loss    Left Side Bending Major loss      Cervical Special Tests: NT   Compression: positive  Spurling's:  negative  ULTT:  positive    Passive Range of Motion (degrees):  R standing  Shoulder AROM PROM   Flexion 90 125 P!   Ext  60 P!    Abduction- patient cue dec UT shrug 40 P! 115 P!      Active Range of Motion in  (degrees): L in standing   Shoulder AROM PROM   Flexion 140     Ext 70    Abduction 90 140     Upper Extremity Strength  (R) UE  (L) UE    Elbow flexion: 4+/5 Elbow flexion: 4+/5   Elbow extension: 4/5 Elbow extension: 4/5   Shoulder elevation: 4+/5 P! Shoulder elevation: 4+/5   Shoulder flexion: 4-/5 Shoulder flexion: 4+/5   Shoulder Abduction: 3-/5 unable to reach 90 Shoulder abduction: 4/5   Shoulder ER 3/5 P! Shoulder ER 4-/5   Shoulder IR 4-/5 Shoulder IR 4/5     - max 3 trials 25  15           Impingement   Right Left   Neer's       Hagen-Michael  positive       Rotator Cuff:     Right Left   Drop Arm Test  Positive unable to complete range   negative   Subscapularis Lift Off Test  Unable to perform     Empty Can Test  positive Negative  (+) weakness   Full Can Test positive Negative (+) weakness     Instability:     Right Left   Sulcus Sign negative negative   Anterior Apprehension Test - standing positive      AC Joint/Biceps:     Right Left   AC Joint Compression Test positive        Joint Mobility:    Cervicothoracic: poor    Palpation: TTP SC joint, Anterior subacromial Space, AC joint, and Supraspinatus Region       Scapular Control/Dyskinesis:    Normal / Subtle / Obvious  Comments    Left  subtle Mild shoulder weakness requiring UT activation    Right  obvious Excess UT activation         CMS Impairment/Limitation/Restriction for FOTO SHOULDER  Survey    Therapist reviewed FOTO scores for Jocelyne Duncan on 6/2/2025.   FOTO documents entered into [employer] - see Media section.    INTAKE Score: 36%    Goal: 55    MDC = 5 points          Treatment:  Therapeutic Exercise  TE 1: HEP demonstrate include:  TE 2: Pendulums 30 sec patient need max tactile cue for motor pattern for sx self management  TE 3: R shoulder Isometrics ER 3 x 5 sec, AB 3 x 5 sec hold cue for towel placement  TE 4: Scap retracts x 10 cue for dec UT activation  TE 5: Thoracic ext over chair x 10 arms crossed chest  TE 6: NV: shoulder pulley, IR/ER, wall walks, post capsule stretch      Time Entry(in minutes):  PT Evaluation (Complex) Time Entry: 45  Therapeutic Exercise Time Entry: 15    Assessment & Plan   Assessment  Jocelyne presents with a condition of High complexity.   Presentation of Symptoms: Unstable  Will Comorbidities Impact Care: Yes  Prior lumbar surgery, RA     Functional Limitations: Activity tolerance, Carrying objects, Completing self-care activities, Participating in leisure activities, Disrupted sleep pattern, Functional mobility, Performing household chores, Pain with ADLs/IADLs, Range of motion, Increased risk of fall, Reaching, Transfers  Impairments: Activity intolerance, Impaired physical strength, Pain with functional activity, Abnormal coordination, Abnormal or restricted range of motion, Abnormal muscle firing  Personal Factors Affecting Prognosis: Physical limitations, Fear/anxiety, Pain    Prognosis: Fair  Assessment Details: Jocelyne is a 80 y.o. female referred to outpatient Physical ZxlaroiQ22.411 (ICD-10-CM) - Effusion of right shoulder joint with a medical diagnosis of M75.121 (ICD-10-CM) - Nontraumatic complete tear of right rotator cuff, M19.011 (ICD-10-CM) - Primary osteoarthritis, right shoulder. Pt presents with signs and symptoms consistent /c diagnosis including sig limited R shoulder ROM, B shoulder weakness, poor thoracic mobility and posture, soft tissue  dysfunction leading to dec functional mobility, strength and activity tolerance.   Shoulder special tests inconclusive has nearly all testing produced pain and/or was not able to be preformed thru full range.  Patient subjective report of pain in non dermatomal pattern indicate likely no sig cervical contribution to pain, however, postural deficit observed and will benefit from cervicothoracic postural recognition and correction. Upon physical exam patient demonstrate sig reduced R shoulder motor control.  For example needing sig cue for sx self management pendulums /c inability to release guarding.  Therefore, HEP include isometrics to reestablish/strengthen neuromotor connections. Patient will need f/u.  Also, pattern of pain, chronicity of pain and self limiting and catastrophizing verbiage also indicate elements of central sensitization.  Patient's increased psychosocial concerns /c central sensitization present an unstable clinical presentation which may limit progress, but the intrinsic motivation (for no surgery) to improve will assist.   Pt prognosis is Fair.  Pt will benefit from skilled outpatient Physical Therapy to address the deficits stated above and in the chart below, provide pt/family education, and to maximize pt's level of independence.     Plan  From a physical therapy perspective, the patient would benefit from: Skilled Rehab Services    Planned therapy interventions include: Therapeutic exercise, Therapeutic activities, Neuromuscular re-education, Manual therapy, ADLs/IADLs, and Sensory integration.    Planned modalities to include: Cryotherapy (cold pack), Electrical stimulation - passive/unattended, Thermotherapy (hot pack), and Other (Comment). Dry needling       Visit Frequency: 2 times Per Week for 12 Weeks.       This plan was discussed with Patient.   Discussion participants: Agreed Upon Plan of Care           The patient's spiritual, cultural, and educational needs were considered, and  the patient is agreeable to the plan of care and goals.       Goals:   Active       LTG       Report decreased in pain at worse less than  <   / =  4  /10  to increase tolerance for functional mobility       Start:  06/10/25    Expected End:  08/29/25            Pt to improve R shoulder flex and AB ROM by 75% to allow for improved functional mobility to allow for improvement in IADL's.       Start:  06/10/25    Expected End:  08/29/25            Increased R shoulder AB MMT at 90 deg to 4-/5 to increase tolerance for ADL and work activities       Start:  06/10/25    Expected End:  08/29/25             Increased R  MMT to > 30 to increase tolerance for ADL and work activities.       Start:  06/10/25    Expected End:  08/29/25            Patient will demonstrate improved overall function per SHOULDER FOTO Survey to 55% score or more indicating improved functionality and dec pain presentation.       Start:  06/10/25    Expected End:  08/29/25            Pt to be Independent with HEP to improve ROM and independence with ADL's.       Start:  06/10/25    Expected End:  08/29/25               Patient specific functional goals        Pt to report and demonstrate proper posture in standing and sitting to decrease pain.        Start:  06/10/25    Expected End:  08/29/25            Patient will lift 5# KB chest to eye level shelf /c RUE x 5 /s inc RUE discomfort for inc tolerance to household chores       Start:  06/10/25    Expected End:  08/29/25            Patient report ability to brush hair /s inc RUE discomfort for inc tolerance to self care.        Start:  06/10/25    Expected End:  08/29/25               STG       Report decreased in pain at worse less than  <   / =  6  /10  to increase tolerance for functional mobility       Start:  06/10/25    Expected End:  08/29/25            Pt to improve R shoulder flex and AB ROM by 25% to allow for improved functional mobility to allow for improvement in IADL's.       Start:   06/10/25    Expected End:  08/29/25             Increased R shoulder flex MMT at 90 deg to 4/5 to increase tolerance for ADL and work activities.        Start:  06/10/25    Expected End:  08/29/25            Pt to report be conscious of impaired sitting and standing posture daily to decrease pain.       Start:  06/10/25    Expected End:  08/29/25            Pt to tolerate HEP to improve ROM and independence with ADL's.        Start:  06/10/25    Expected End:  08/29/25                Sandeep Esquivel, PT

## 2025-06-02 NOTE — LETTER
Dixie 10, 2025  Adalberto Silva MD  [address] [facility] [address]    To whom it may concern,     The attached plan of care is being sent to you for review and reference.    You may indicate your approval by signing the document electronically, or by faxing/mailing a signed copy of the final page of this document back to the attention of Sandeep Esquivel, PT:         Plan of Care 6/10/25   Effective from: 6/10/2025  Effective to: 8/29/2025    Plan ID: [id number]            Participants as of Finalize on 6/10/2025    Name Type Comments Contact Info    Adalberto Silva MD Referring Provider  [phone]       Last Plan Note     Author: Sandeep Esquivel PT Status: Signed Last edited: 6/2/2025  1:30 PM             Outpatient Rehab    Physical Therapy Evaluation    Patient Name: Jocelyne Duncan  MRN: [id number]  YOB: 1945  Encounter Date: 6/2/2025    Therapy Diagnosis:   Encounter Diagnoses   Name Primary?   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      Physician: Adalberto Silva MD    Physician Orders: PT Eval and Treat   home exercises and physical therapy for rotator cuff and periscapular strengthening   Medical Diagnosis from Referral:   M75.121 (ICD-10-CM) - Nontraumatic complete tear of right rotator cuff   M19.011 (ICD-10-CM) - Primary osteoarthritis, right shoulder   M25.411 (ICD-10-CM) - Effusion of right shoulder joint     Evaluation Date: 6/2/2025  Authorization Period Expiration: 05/06/26  Plan of Care Expiration: 08/29/25  Visit # / Visits authorized: 01/ 01    Progress Note Due: 07/02/25  FOTO: 1/3     Precautions: Standard and Fall  2 back surgeries - per patient, patient unable to clarify type, region  signs of central sensitization     Time In: 1330  Time Out: 1430  Total Appointment Time (timed & untimed codes): 60 minutes    Subjective   Date of onset: chronic /c exacerbation 2  months ago while brushing teeth   History of current condition - Jocelyne reports: R lat shoulder pain /c progression down to wrist and up to neck.  Patient report shoulder discomfort as an intense ache.  Patient also report intermittent cramping in forearm and hand.  Patient report no change in sx AM vs PM.  Pt report intermittent N/T in B hand.  Patient report having  MD appointment to investigate CTS /c possible surgery on L side.    Patient arrives /c 3 wheeled rolling walker noting sig concern for balance and hx of falls (during COVID).    Patient report is unable to raise RUE over head for hair care.  Patient report difficulty /c household chores having  complete cleaning and pain /c cooking tasks.  Patient is R handed.   Patient note recent tx of fluid removal from shoulder ended up leading to inc discomfort. Patient report getting cortisone shot from referring provider /c only 5 day relief.  Patient report mild relief /c SalonPas patch.  Patient report note taking pain meds.    Patient report prior PT for LB and shoulder /c mod relief feeling stronger.  However, patient report not being issued /c HEP.    Patient report primary goal is to not have to go to surgery.      Per MD report   Jocelyne is a 80 y.o. Right hand dominant female who presents today for evaluation of right shoulder pain. She is seen today at the referral of Dr. Cooper Daniel. She reports longstanding intermittent right shoulder pain. She injured the right shoulder approximately 4 weeks ago. She was lifting something when she felt a pop. She developed a large effusion and swelling in the right shoulder. The swelling has persisted. Since that time she has difficulty raising the arm, worsening pain and weakness. The pain is worse with lifting and worse at night. She recently started physical therapy. She is right-hand dominant and retired. She comes in today for evaluation. Provider     PETE: chronic insidious /c recent exacerbation /c brushing  "teeth motion   Any fall:  N  Any popping or clicking or locking:  Y /c motion  Any difficult to elevate shoulder flexion, abd, ER, or IR:  Y  Does pain radiates:  Y cervical   Pain constant or intermittent: constant  Has done any injections:   Y, no relief     Pain:  Current 810 /c patch, worst 8/10, best 8/10   Location: right shoulder   Description: intense ache  Aggravating Factors:  reaching -overhead, shoulder movement   Easing Factors: rest "don't move it"     Prior Therapy: PT for LB, shoulder /c mod relief   Social History: lives in [facility], [address] [place] difficulty navigating lives with their spouse  Occupation: retired - cook/   Prior Level of Function: Kassandra  Current Level of Function: Idalia /c  assistance /c "everything"       Imaging:  Per MD report:   I have personally reviewed the following imagin2025: I reviewed MRI of the right shoulder from [facility]. MRI of the right shoulder reveals moderate glenohumeral arthritis, a 6 cm full-thickness tear of supraspinatus and infraspinatus with retraction medial to the level of the glenoid and grade 4 atrophy and fatty infiltration. She has a partial tear of the subscapularis. She has a large joint effusion.       Medical History:   Past Medical History:   Diagnosis Date    Allergy     Arthritis     GERD (gastroesophageal reflux disease)     HTN (hypertension)     Premature atrial complexes     Rheumatoid arthritis        Surgical History:   Jocelyne Duncan  has a past surgical history that includes Lumbar spine surgery and Partial hysterectomy.    Medications:   Jocelyne has a current medication list which includes the following prescription(s): acetaminophen, albuterol, amlodipine, atorvastatin, breo ellipta, calcium carbonate/vitamin d3, diclofenac sodium, doxazosin, epinephrine, esomeprazole, fluticasone propionate, furosemide, gabapentin, hydrochlorothiazide, leflunomide, losartan, nystatin, and prednisone.    Allergies:   Review of patient's " allergies indicates:   Allergen Reactions    Iodine      Swelling (throat)^  Unsure of what drug/food triggered the reaction      Lisinopril Anaphylaxis    Adhesive Itching    Penicillins           Objective     Full range of motion with a painful arc at 90°, range of motion 120/150/90/30/60. No lag signs  4/5 supraspinatus, 4/5 infraspinatus, 4/5 subscapularis. Negative belly press test  No instability, negative anterior load and shift, negative jerk test  Motor and sensory function are intact in the axillary, radial, ulnar, median nerve dist     Posture Alignment: slouched posture;;   Shoulder rotation at rest: mod B shoulder IR    Sensation: Light touch:    BUE LT intact   C8 myotome intact     DTR:    Bicep R2+ L1+    GAIT DEVIATIONS: Jocelyne  amb /c 3 wheeled rolling walker - fwd to rolling walker    Cervical Range of Motion:    Degrees Pain   Flexion Mod loss      Extension Mod loss      Right Rotation Major loss P!     Left Rotation Mod loss      Right Side Bending Major loss    Left Side Bending Major loss      Cervical Special Tests: NT   Compression: positive  Spurling's:  negative  ULTT:  positive    Passive Range of Motion (degrees):  R standing  Shoulder AROM PROM   Flexion 90 125 P!   Ext  60 P!    Abduction- patient cue dec UT shrug 40 P! 115 P!      Active Range of Motion in  (degrees): L in standing   Shoulder AROM PROM   Flexion 140     Ext 70    Abduction 90 140     Upper Extremity Strength  (R) UE  (L) UE    Elbow flexion: 4+/5 Elbow flexion: 4+/5   Elbow extension: 4/5 Elbow extension: 4/5   Shoulder elevation: 4+/5 P! Shoulder elevation: 4+/5   Shoulder flexion: 4-/5 Shoulder flexion: 4+/5   Shoulder Abduction: 3-/5 unable to reach 90 Shoulder abduction: 4/5   Shoulder ER 3/5 P! Shoulder ER 4-/5   Shoulder IR 4-/5 Shoulder IR 4/5     - max 3 trials 25  15           Impingement   Right Left   Neer's       Hagen-Michael  positive       Rotator Cuff:     Right Left   Drop Arm Test  Positive  unable to complete range   negative   Subscapularis Lift Off Test  Unable to perform     Empty Can Test  positive Negative  (+) weakness   Full Can Test positive Negative (+) weakness     Instability:     Right Left   Sulcus Sign negative negative   Anterior Apprehension Test - standing positive      AC Joint/Biceps:     Right Left   AC Joint Compression Test positive        Joint Mobility:    Cervicothoracic: poor    Palpation: TTP SC joint, Anterior subacromial Space, AC joint, and Supraspinatus Region       Scapular Control/Dyskinesis:    Normal / Subtle / Obvious  Comments    Left  subtle Mild shoulder weakness requiring UT activation    Right  obvious Excess UT activation         CMS Impairment/Limitation/Restriction for FOTO SHOULDER Survey    Therapist reviewed FOTO scores for Jocelyne Duncan on 6/2/2025.   FOTO documents entered into [employer] - see Media section.    INTAKE Score: 36%    Goal: 55    MDC = 5 points          Treatment:  Therapeutic Exercise  TE 1: HEP demonstrate include:  TE 2: Pendulums 30 sec patient need max tactile cue for motor pattern for sx self management  TE 3: R shoulder Isometrics ER 3 x 5 sec, AB 3 x 5 sec hold cue for towel placement  TE 4: Scap retracts x 10 cue for dec UT activation  TE 5: Thoracic ext over chair x 10 arms crossed chest  TE 6: NV: shoulder pulley, IR/ER, wall walks, post capsule stretch      Time Entry(in minutes):  PT Evaluation (Complex) Time Entry: 45  Therapeutic Exercise Time Entry: 15    Assessment & Plan   Assessment  Jocelyne presents with a condition of High complexity.   Presentation of Symptoms: Unstable  Will Comorbidities Impact Care: Yes  Prior lumbar surgery, RA     Functional Limitations: Activity tolerance, Carrying objects, Completing self-care activities, Participating in leisure activities, Disrupted sleep pattern, Functional mobility, Performing household chores, Pain with ADLs/IADLs, Range of motion, Increased risk of fall, Reaching,  Transfers  Impairments: Activity intolerance, Impaired physical strength, Pain with functional activity, Abnormal coordination, Abnormal or restricted range of motion, Abnormal muscle firing  Personal Factors Affecting Prognosis: Physical limitations, Fear/anxiety, Pain    Prognosis: Fair  Assessment Details: Jocelyne is a 80 y.o. female referred to outpatient Physical [id number] (ICD-10-CM) - Effusion of right shoulder joint with a medical diagnosis of M75.121 (ICD-10-CM) - Nontraumatic complete tear of right rotator cuff, M19.011 (ICD-10-CM) - Primary osteoarthritis, right shoulder. Pt presents with signs and symptoms consistent /c diagnosis including sig limited R shoulder ROM, B shoulder weakness, poor thoracic mobility and posture, soft tissue dysfunction leading to dec functional mobility, strength and activity tolerance.   Shoulder special tests inconclusive has nearly all testing produced pain and/or was not able to be preformed thru full range.  Patient subjective report of pain in non dermatomal pattern indicate likely no sig cervical contribution to pain, however, postural deficit observed and will benefit from cervicothoracic postural recognition and correction. Upon physical exam patient demonstrate sig reduced R shoulder motor control.  For example needing sig cue for sx self management pendulums /c inability to release guarding.  Therefore, HEP include isometrics to reestablish/strengthen neuromotor connections. Patient will need f/u.  Also, pattern of pain, chronicity of pain and self limiting and catastrophizing verbiage also indicate elements of central sensitization.  Patient's increased psychosocial concerns /c central sensitization present an unstable clinical presentation which may limit progress, but the intrinsic motivation (for no surgery) to improve will assist.   Pt prognosis is Fair.  Pt will benefit from skilled outpatient Physical Therapy to address the deficits stated above and in the  chart below, provide pt/family education, and to maximize pt's level of independence.     Plan  From a physical therapy perspective, the patient would benefit from: Skilled Rehab Services    Planned therapy interventions include: Therapeutic exercise, Therapeutic activities, Neuromuscular re-education, Manual therapy, ADLs/IADLs, and Sensory integration.    Planned modalities to include: Cryotherapy (cold pack), Electrical stimulation - passive/unattended, Thermotherapy (hot pack), and Other (Comment). Dry needling       Visit Frequency: 2 times Per Week for 12 Weeks.       This plan was discussed with Patient.   Discussion participants: Agreed Upon Plan of Care           The patient's spiritual, cultural, and educational needs were considered, and the patient is agreeable to the plan of care and goals.       Goals:   Active       LTG       Report decreased in pain at worse less than  <   / =  4  /10  to increase tolerance for functional mobility       Start:  06/10/25    Expected End:  08/29/25            Pt to improve R shoulder flex and AB ROM by 75% to allow for improved functional mobility to allow for improvement in IADL's.       Start:  06/10/25    Expected End:  08/29/25            Increased R shoulder AB MMT at 90 deg to 4-/5 to increase tolerance for ADL and work activities       Start:  06/10/25    Expected End:  08/29/25             Increased R  MMT to > 30 to increase tolerance for ADL and work activities.       Start:  06/10/25    Expected End:  08/29/25            Patient will demonstrate improved overall function per SHOULDER FOTO Survey to 55% score or more indicating improved functionality and dec pain presentation.       Start:  06/10/25    Expected End:  08/29/25            Pt to be Independent with HEP to improve ROM and independence with ADL's.       Start:  06/10/25    Expected End:  08/29/25               Patient specific functional goals        Pt to report and demonstrate proper posture  in standing and sitting to decrease pain.        Start:  06/10/25    Expected End:  08/29/25            Patient will lift 5# KB chest to eye level shelf /c RUE x 5 /s inc RUE discomfort for inc tolerance to household chores       Start:  06/10/25    Expected End:  08/29/25            Patient report ability to brush hair /s inc RUE discomfort for inc tolerance to self care.        Start:  06/10/25    Expected End:  08/29/25               STG       Report decreased in pain at worse less than  <   / =  6  /10  to increase tolerance for functional mobility       Start:  06/10/25    Expected End:  08/29/25            Pt to improve R shoulder flex and AB ROM by 25% to allow for improved functional mobility to allow for improvement in IADL's.       Start:  06/10/25    Expected End:  08/29/25             Increased R shoulder flex MMT at 90 deg to 4/5 to increase tolerance for ADL and work activities.        Start:  06/10/25    Expected End:  08/29/25            Pt to report be conscious of impaired sitting and standing posture daily to decrease pain.       Start:  06/10/25    Expected End:  08/29/25            Pt to tolerate HEP to improve ROM and independence with ADL's.        Start:  06/10/25    Expected End:  08/29/25                Sandeep Esquivel PT           Current Participants as of 6/10/2025    Name Type Comments Contact Info    Adalberto Silva MD Referring Provider  [phone]    Signature pending            Sincerely,      Sandeep Esquivel PT  [facility]                                                            Dear Sandeep Esquivel PT,    RE: Ms. Jocelyne Duncan, MRN: [id number]    I certify that I have reviewed the attached plan of care and agree to the details within.        ___________________________  ___________________________  Provider Printed Name   Provider Signed Name      ___________________________  Date and Time

## 2025-06-10 PROBLEM — G89.29 CENTRAL SENSITIZATION TO PAIN: Status: ACTIVE | Noted: 2025-06-10

## 2025-06-10 PROBLEM — R29.898 WEAKNESS OF RIGHT SHOULDER: Status: ACTIVE | Noted: 2025-06-10

## 2025-06-10 PROBLEM — M25.611 DECREASED RIGHT SHOULDER RANGE OF MOTION: Status: ACTIVE | Noted: 2025-06-10

## 2025-06-11 ENCOUNTER — CLINICAL SUPPORT (OUTPATIENT)
Dept: REHABILITATION | Facility: OTHER | Age: 80
End: 2025-06-11
Payer: MEDICARE

## 2025-06-11 DIAGNOSIS — M25.611 DECREASED RIGHT SHOULDER RANGE OF MOTION: ICD-10-CM

## 2025-06-11 DIAGNOSIS — R29.898 WEAKNESS OF RIGHT SHOULDER: Primary | ICD-10-CM

## 2025-06-11 DIAGNOSIS — G89.29 CENTRAL SENSITIZATION TO PAIN: ICD-10-CM

## 2025-06-11 PROCEDURE — 97110 THERAPEUTIC EXERCISES: CPT | Mod: CQ

## 2025-06-11 NOTE — PROGRESS NOTES
"  Outpatient Rehab    Physical Therapy Visit    Patient Name: Jocelyne Duncan  MRN: [id number]  YOB: 1945  Encounter Date: 6/11/2025    Therapy Diagnosis:   Encounter Diagnoses   Name Primary?    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      Physician: Adalberto Silva MD    Physician Orders: Eval and Treat  Medical Diagnosis: Nontraumatic complete tear of right rotator cuff  Primary osteoarthritis, right shoulder  Effusion of right shoulder joint  Surgical Diagnosis: Not applicable for this Episode   Surgical Date: Not applicable for this Episode    Visit # / Visits Authorized:  1 / 10  Insurance Authorization Period: 6/2/2025 to 12/31/2025  Date of Evaluation: 6/10/2025  Plan of Care Certification: 6/10/2025 to 8/29/2025      PT/PTA: PTA   Number of PTA visits since last PT visit:1  Time In: 0100   Time Out: 0150  Total Time (in minutes): 50   Total Billable Time (in minutes): 30    FOTO:  Intake Score:  %  Survey Score 2:  %  Survey Score 3:  %    Precautions:       Subjective   She is not doing good. She hurts everywhere. She thinks her low back is hurting and she thinks this might be from the exercises..  Pain reported as 8/10. R shoulder    Objective            Treatment:  Therapeutic Exercise  TE 1: UBE 2/2'  TE 2: Pulleys flex/Scap x 2' ea  TE 3: R shoulder Isometrics ER 3 x 5 sec, AB 3 x 5 sec hold cue for towel placement  TE 4: Scap retracts 2 x 10 cue for dec UT activation  TE 5: Thoracic ext over chair x 10 arms crossed chest  TE 7: Seated table slides flex/scaption 15 x 5"  TE 8: Seated ball rollout x 15  TE 9: Standing rows YTB 2 x 10  Manual Therapy  MT 1: Distraction  MT 2: PROM  MT 3: NP      Time Entry(in minutes):  Manual Therapy Time Entry: 5  Therapeutic Exercise Time Entry: 45    Assessment & Plan   Assessment: Pt presents to session with symptoms unchanged. Pt had fair tolerance to therex as pt reports inc " pain. Pt has limited GHJ ROM and strength.  Evaluation/Treatment Tolerance: Patient limited by pain    The patient will continue to benefit from skilled outpatient physical therapy in order to address the deficits listed in the problem list on the initial evaluation, provide patient and family education, and maximize the patients level of independence in the home and community environments.     The patient's spiritual, cultural, and educational needs were considered, and the patient is agreeable to the plan of care and goals.           Plan: Continue POC    Goals:   Active       LTG       Report decreased in pain at worse less than  <   / =  4  /10  to increase tolerance for functional mobility       Start:  06/10/25    Expected End:  08/29/25            Pt to improve R shoulder flex and AB ROM by 75% to allow for improved functional mobility to allow for improvement in IADL's.       Start:  06/10/25    Expected End:  08/29/25            Increased R shoulder AB MMT at 90 deg to 4-/5 to increase tolerance for ADL and work activities       Start:  06/10/25    Expected End:  08/29/25             Increased R  MMT to > 30 to increase tolerance for ADL and work activities.       Start:  06/10/25    Expected End:  08/29/25            Patient will demonstrate improved overall function per SHOULDER FOTO Survey to 55% score or more indicating improved functionality and dec pain presentation.       Start:  06/10/25    Expected End:  08/29/25            Pt to be Independent with HEP to improve ROM and independence with ADL's.       Start:  06/10/25    Expected End:  08/29/25               Patient specific functional goals        Pt to report and demonstrate proper posture in standing and sitting to decrease pain.        Start:  06/10/25    Expected End:  08/29/25            Patient will lift 5# KB chest to eye level shelf /c RUE x 5 /s inc RUE discomfort for inc tolerance to household chores       Start:  06/10/25     Expected End:  08/29/25            Patient report ability to brush hair /s inc RUE discomfort for inc tolerance to self care.        Start:  06/10/25    Expected End:  08/29/25               STG       Report decreased in pain at worse less than  <   / =  6  /10  to increase tolerance for functional mobility       Start:  06/10/25    Expected End:  08/29/25            Pt to improve R shoulder flex and AB ROM by 25% to allow for improved functional mobility to allow for improvement in IADL's.       Start:  06/10/25    Expected End:  08/29/25             Increased R shoulder flex MMT at 90 deg to 4/5 to increase tolerance for ADL and work activities.        Start:  06/10/25    Expected End:  08/29/25            Pt to report be conscious of impaired sitting and standing posture daily to decrease pain.       Start:  06/10/25    Expected End:  08/29/25            Pt to tolerate HEP to improve ROM and independence with ADL's.        Start:  06/10/25    Expected End:  08/29/25                Kevin Samuel, PTA

## 2025-06-13 ENCOUNTER — CLINICAL SUPPORT (OUTPATIENT)
Dept: REHABILITATION | Facility: OTHER | Age: 80
End: 2025-06-13
Payer: MEDICARE

## 2025-06-13 DIAGNOSIS — G89.29 CENTRAL SENSITIZATION TO PAIN: ICD-10-CM

## 2025-06-13 DIAGNOSIS — R29.898 WEAKNESS OF RIGHT SHOULDER: Primary | ICD-10-CM

## 2025-06-13 DIAGNOSIS — M25.611 DECREASED RIGHT SHOULDER RANGE OF MOTION: ICD-10-CM

## 2025-06-13 PROCEDURE — 97110 THERAPEUTIC EXERCISES: CPT

## 2025-06-13 NOTE — PROGRESS NOTES
"  Outpatient Rehab    Physical Therapy Visit    Patient Name: Jocelyne Duncan  MRN: [id number]  YOB: 1945  Encounter Date: 6/13/2025    Therapy Diagnosis:   Encounter Diagnoses   Name Primary?    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      Physician: Adalberto Silva MD    Physician Orders: Eval and Treat  Medical Diagnosis: Nontraumatic complete tear of right rotator cuff  Primary osteoarthritis, right shoulder  Effusion of right shoulder joint  Surgical Diagnosis: Not applicable for this Episode   Surgical Date: Not applicable for this Episode    Visit # / Visits Authorized:  2 / 10  Insurance Authorization Period: 6/2/2025 to 12/31/2025  Date of Evaluation: 6/10/2025  Plan of Care Certification: 6/10/2025 to 8/29/2025      PT/PTA: PT   Number of PTA visits since last PT visit:0  Time In: 0200   Time Out: 0300  Total Time (in minutes): 60   Total Billable Time (in minutes): 30    FOTO:  Intake Score:  %  Survey Score 2:  %  Survey Score 3:  %    Precautions:       Subjective   Patient reports not feeling good as weather has increased her pain..  Pain reported as 8/10.      Objective            Treatment:  Therapeutic Exercise  TE 1: UBE 2/2'  TE 2: Pulleys flex/Scap x 2' ea  TE 3: R shoulder Isometrics ER 3 x 5 sec, AB 3 x 5 sec hold cue for towel placement  TE 4: Scap retracts 2 x 10 cue for dec UT activation  TE 5: Thoracic ext over chair x 10 arms crossed chest  TE 7: Seated table slides flex/scaption 15 x 5"  TE 8: Seated ball rollout x 15  TE 9: Standing rows YTB 2 x 10  Manual Therapy  MT 1: Distraction  MT 2: PROM  Modalities  Cryotherapy (Minutes\Location): 5/ R shoulder      Time Entry(in minutes):  Hot/Cold Pack Time Entry: 5  Manual Therapy Time Entry: 5  Therapeutic Exercise Time Entry: 50    Assessment & Plan   Assessment: Patient presents to therapy with increased shoulder pain. She performed exericses with " increased pain/discomfort. Patient has poor tolerance to PROM. However, noted further increase in R shoulder flexion and ABD PROM towards end of MT. Will continue to monitor and progress exercises as tolerated by pt to further address impairments and improve overall functional mobility.  Evaluation/Treatment Tolerance: Patient limited by pain    The patient will continue to benefit from skilled outpatient physical therapy in order to address the deficits listed in the problem list on the initial evaluation, provide patient and family education, and maximize the patients level of independence in the home and community environments.     The patient's spiritual, cultural, and educational needs were considered, and the patient is agreeable to the plan of care and goals.           Plan: Continue POC    Goals:   Active       LTG       Report decreased in pain at worse less than  <   / =  4  /10  to increase tolerance for functional mobility       Start:  06/10/25    Expected End:  08/29/25            Pt to improve R shoulder flex and AB ROM by 75% to allow for improved functional mobility to allow for improvement in IADL's.       Start:  06/10/25    Expected End:  08/29/25            Increased R shoulder AB MMT at 90 deg to 4-/5 to increase tolerance for ADL and work activities       Start:  06/10/25    Expected End:  08/29/25             Increased R  MMT to > 30 to increase tolerance for ADL and work activities.       Start:  06/10/25    Expected End:  08/29/25            Patient will demonstrate improved overall function per SHOULDER FOTO Survey to 55% score or more indicating improved functionality and dec pain presentation.       Start:  06/10/25    Expected End:  08/29/25            Pt to be Independent with HEP to improve ROM and independence with ADL's.       Start:  06/10/25    Expected End:  08/29/25               Patient specific functional goals        Pt to report and demonstrate proper posture in  standing and sitting to decrease pain.        Start:  06/10/25    Expected End:  08/29/25            Patient will lift 5# KB chest to eye level shelf /c RUE x 5 /s inc RUE discomfort for inc tolerance to household chores       Start:  06/10/25    Expected End:  08/29/25            Patient report ability to brush hair /s inc RUE discomfort for inc tolerance to self care.        Start:  06/10/25    Expected End:  08/29/25               STG       Report decreased in pain at worse less than  <   / =  6  /10  to increase tolerance for functional mobility       Start:  06/10/25    Expected End:  08/29/25            Pt to improve R shoulder flex and AB ROM by 25% to allow for improved functional mobility to allow for improvement in IADL's.       Start:  06/10/25    Expected End:  08/29/25             Increased R shoulder flex MMT at 90 deg to 4/5 to increase tolerance for ADL and work activities.        Start:  06/10/25    Expected End:  08/29/25            Pt to report be conscious of impaired sitting and standing posture daily to decrease pain.       Start:  06/10/25    Expected End:  08/29/25            Pt to tolerate HEP to improve ROM and independence with ADL's.        Start:  06/10/25    Expected End:  08/29/25                Teresa Chaudhary, PT

## 2025-06-18 ENCOUNTER — CLINICAL SUPPORT (OUTPATIENT)
Dept: REHABILITATION | Facility: OTHER | Age: 80
End: 2025-06-18
Payer: MEDICARE

## 2025-06-18 DIAGNOSIS — G89.29 CENTRAL SENSITIZATION TO PAIN: ICD-10-CM

## 2025-06-18 DIAGNOSIS — R29.898 WEAKNESS OF RIGHT SHOULDER: Primary | ICD-10-CM

## 2025-06-18 DIAGNOSIS — M25.611 DECREASED RIGHT SHOULDER RANGE OF MOTION: ICD-10-CM

## 2025-06-18 PROCEDURE — 97110 THERAPEUTIC EXERCISES: CPT | Mod: CQ

## 2025-06-18 PROCEDURE — 97140 MANUAL THERAPY 1/> REGIONS: CPT | Mod: CQ

## 2025-06-18 PROCEDURE — 97112 NEUROMUSCULAR REEDUCATION: CPT | Mod: CQ

## 2025-06-18 NOTE — PROGRESS NOTES
"  Outpatient Rehab    Physical Therapy Visit    Patient Name: Jocelyne Duncan  MRN: [id number]  YOB: 1945  Encounter Date: 6/18/2025    Therapy Diagnosis:   Encounter Diagnoses   Name Primary?    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      Physician: Adalberto Silva MD    Physician Orders: Eval and Treat  Medical Diagnosis: Nontraumatic complete tear of right rotator cuff  Primary osteoarthritis, right shoulder  Effusion of right shoulder joint  Surgical Diagnosis: Not applicable for this Episode   Surgical Date: Not applicable for this Episode    Visit # / Visits Authorized:  3 / 10  Insurance Authorization Period: 6/2/2025 to 12/31/2025  Date of Evaluation: 6/10/2025  Plan of Care Certification: 6/10/2025 to 8/29/2025      PT/PTA: PTA   Number of PTA visits since last PT visit:1  Time In: 1330   Time Out: 1430  Total Time (in minutes): 60   Total Billable Time (in minutes): 50    FOTO:  Intake Score:  %  Survey Score 2:  %  Survey Score 3:  %    Precautions:       Subjective   She is feeling ok. very painful when she trys to move arm.  Pain reported as 8/10.      Objective            Treatment:  Therapeutic Exercise  TE 1: UBE 2/2'  TE 2: Pulleys flex/Scap x 2' ea  TE 3: R shoulder Isometrics ER 3 x 5 sec, AB 3 x 5 sec hold cue for towel placement  TE 4: Scap retracts 2 x 10 cue for dec UT activation  TE 5: Thoracic ext over chair x 10 arms crossed chest  TE 6: seated IR/ER iso with thera cord  TE 7: Seated table slides flex/scaption 15 x 5"  TE 8: Seated ball rollout x 15  TE 9: Standing rows YTB 2 x 10        Time Entry(in minutes):       Assessment & Plan   Assessment: Pt presents for follow up visit reporting similar symptoms. Fair tolerance to exercise today. Painful active ROM and isometrics. Pt does fair with PROM and can achieve functional ROM limits        The patient will continue to benefit from skilled outpatient " physical therapy in order to address the deficits listed in the problem list on the initial evaluation, provide patient and family education, and maximize the patients level of independence in the home and community environments.     The patient's spiritual, cultural, and educational needs were considered, and the patient is agreeable to the plan of care and goals.           Plan: Continue POC    Goals:   Active       LTG       Report decreased in pain at worse less than  <   / =  4  /10  to increase tolerance for functional mobility       Start:  06/10/25    Expected End:  08/29/25            Pt to improve R shoulder flex and AB ROM by 75% to allow for improved functional mobility to allow for improvement in IADL's.       Start:  06/10/25    Expected End:  08/29/25            Increased R shoulder AB MMT at 90 deg to 4-/5 to increase tolerance for ADL and work activities       Start:  06/10/25    Expected End:  08/29/25             Increased R  MMT to > 30 to increase tolerance for ADL and work activities.       Start:  06/10/25    Expected End:  08/29/25            Patient will demonstrate improved overall function per SHOULDER FOTO Survey to 55% score or more indicating improved functionality and dec pain presentation.       Start:  06/10/25    Expected End:  08/29/25            Pt to be Independent with HEP to improve ROM and independence with ADL's.       Start:  06/10/25    Expected End:  08/29/25               Patient specific functional goals        Pt to report and demonstrate proper posture in standing and sitting to decrease pain.        Start:  06/10/25    Expected End:  08/29/25            Patient will lift 5# KB chest to eye level shelf /c RUE x 5 /s inc RUE discomfort for inc tolerance to household chores       Start:  06/10/25    Expected End:  08/29/25            Patient report ability to brush hair /s inc RUE discomfort for inc tolerance to self care.        Start:  06/10/25    Expected End:   08/29/25               STG       Report decreased in pain at worse less than  <   / =  6  /10  to increase tolerance for functional mobility       Start:  06/10/25    Expected End:  08/29/25            Pt to improve R shoulder flex and AB ROM by 25% to allow for improved functional mobility to allow for improvement in IADL's.       Start:  06/10/25    Expected End:  08/29/25             Increased R shoulder flex MMT at 90 deg to 4/5 to increase tolerance for ADL and work activities.        Start:  06/10/25    Expected End:  08/29/25            Pt to report be conscious of impaired sitting and standing posture daily to decrease pain.       Start:  06/10/25    Expected End:  08/29/25            Pt to tolerate HEP to improve ROM and independence with ADL's.        Start:  06/10/25    Expected End:  08/29/25                Pj Jordan, PTA

## 2025-06-20 ENCOUNTER — CLINICAL SUPPORT (OUTPATIENT)
Dept: REHABILITATION | Facility: OTHER | Age: 80
End: 2025-06-20
Payer: MEDICARE

## 2025-06-20 DIAGNOSIS — R29.898 WEAKNESS OF RIGHT SHOULDER: Primary | ICD-10-CM

## 2025-06-20 DIAGNOSIS — M25.611 DECREASED RIGHT SHOULDER RANGE OF MOTION: ICD-10-CM

## 2025-06-20 DIAGNOSIS — G89.29 CENTRAL SENSITIZATION TO PAIN: ICD-10-CM

## 2025-06-20 PROCEDURE — 97110 THERAPEUTIC EXERCISES: CPT | Mod: CQ

## 2025-06-20 NOTE — PROGRESS NOTES
"  Outpatient Rehab    Physical Therapy Visit    Patient Name: Jocelyne Duncan  MRN: [id number]  YOB: 1945  Encounter Date: 6/20/2025    Therapy Diagnosis:   Encounter Diagnoses   Name Primary?    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      Physician: Adalberto Silva MD    Physician Orders: Eval and Treat  Medical Diagnosis: Nontraumatic complete tear of right rotator cuff  Primary osteoarthritis, right shoulder  Effusion of right shoulder joint  Surgical Diagnosis: Not applicable for this Episode   Surgical Date: Not applicable for this Episode  Days Since Last Surgery: Not applicable for this Episode    Visit # / Visits Authorized:  4 / 10  Insurance Authorization Period: 6/2/2025 to 12/31/2025  Date of Evaluation: 6/10/2025  Plan of Care Certification: 6/10/2025 to 8/29/2025      PT/PTA: PTA   Number of PTA visits since last PT visit:2  Time In: 0131   Time Out: 0220  Total Time (in minutes): 49   Total Billable Time (in minutes): 25    FOTO:  Intake Score:  %  Survey Score 2:  %  Survey Score 3:  %    Precautions:       Subjective   She is sore from last visit..  Pain reported as 9/10.      Objective            Treatment:  Therapeutic Exercise  TE 1: UBE 2/2'  TE 3: R shoulder Isometrics ER/AB/IR/Ext 10 x 3" ea  TE 4: Scap retracts 2 x 10 cue for dec UT activation  TE 5: Thoracic ext over chair x 10 arms crossed chest  TE 6: seated IR/ER iso with thera cord  TE 7: Seated table slides flex/scaption x 2'  TE 8: Seated ball rollout x 15  TE 9: Standing rows YTB 2 x 10      Time Entry(in minutes):       Assessment & Plan   Assessment: Pt presents to therapy with symptoms unchanged. Pt continues to have pain with isometrics and active ROM. Session focused on GHJ and periscapular strengthening within tolerance.   Evaluation/Treatment Tolerance: Patient limited by pain    The patient will continue to benefit from skilled outpatient " physical therapy in order to address the deficits listed in the problem list on the initial evaluation, provide patient and family education, and maximize the patients level of independence in the home and community environments.     The patient's spiritual, cultural, and educational needs were considered, and the patient is agreeable to the plan of care and goals.           Plan: Continue POC    Goals:   Active       LTG       Report decreased in pain at worse less than  <   / =  4  /10  to increase tolerance for functional mobility       Start:  06/10/25    Expected End:  08/29/25            Pt to improve R shoulder flex and AB ROM by 75% to allow for improved functional mobility to allow for improvement in IADL's.       Start:  06/10/25    Expected End:  08/29/25            Increased R shoulder AB MMT at 90 deg to 4-/5 to increase tolerance for ADL and work activities       Start:  06/10/25    Expected End:  08/29/25             Increased R  MMT to > 30 to increase tolerance for ADL and work activities.       Start:  06/10/25    Expected End:  08/29/25            Patient will demonstrate improved overall function per SHOULDER FOTO Survey to 55% score or more indicating improved functionality and dec pain presentation.       Start:  06/10/25    Expected End:  08/29/25            Pt to be Independent with HEP to improve ROM and independence with ADL's.       Start:  06/10/25    Expected End:  08/29/25               Patient specific functional goals        Pt to report and demonstrate proper posture in standing and sitting to decrease pain.        Start:  06/10/25    Expected End:  08/29/25            Patient will lift 5# KB chest to eye level shelf /c RUE x 5 /s inc RUE discomfort for inc tolerance to household chores       Start:  06/10/25    Expected End:  08/29/25            Patient report ability to brush hair /s inc RUE discomfort for inc tolerance to self care.        Start:  06/10/25    Expected End:   08/29/25               STG       Report decreased in pain at worse less than  <   / =  6  /10  to increase tolerance for functional mobility       Start:  06/10/25    Expected End:  08/29/25            Pt to improve R shoulder flex and AB ROM by 25% to allow for improved functional mobility to allow for improvement in IADL's.       Start:  06/10/25    Expected End:  08/29/25             Increased R shoulder flex MMT at 90 deg to 4/5 to increase tolerance for ADL and work activities.        Start:  06/10/25    Expected End:  08/29/25            Pt to report be conscious of impaired sitting and standing posture daily to decrease pain.       Start:  06/10/25    Expected End:  08/29/25            Pt to tolerate HEP to improve ROM and independence with ADL's.        Start:  06/10/25    Expected End:  08/29/25                Kevin Samuel, PTA

## 2025-06-26 ENCOUNTER — TELEPHONE (OUTPATIENT)
Dept: PULMONOLOGY | Facility: CLINIC | Age: 80
End: 2025-06-26
Payer: MEDICARE

## 2025-06-26 NOTE — TELEPHONE ENCOUNTER
Pt came into clinic upset stating that some one contacted her in regards to coming to [facility] for Pulmonary appointment on today. I advised pt that no one from here contacted her in regards to coming to appointment but however, I did see in her chart where my co worker called and left voicemail advising pt to contact the office. Pt  started yelling stating that he is tired of this hospital and that we are the worse and that him and wife is about to leave. I verbalized to pt  that I understand.

## 2025-07-17 ENCOUNTER — OFFICE VISIT (OUTPATIENT)
Dept: PULMONOLOGY | Facility: CLINIC | Age: 80
End: 2025-07-17
Payer: MEDICARE

## 2025-07-17 VITALS
DIASTOLIC BLOOD PRESSURE: 71 MMHG | BODY MASS INDEX: 27.66 KG/M2 | SYSTOLIC BLOOD PRESSURE: 107 MMHG | WEIGHT: 140.88 LBS | HEART RATE: 76 BPM | OXYGEN SATURATION: 95 % | HEIGHT: 60 IN

## 2025-07-17 DIAGNOSIS — I27.20 PULMONARY HYPERTENSION: ICD-10-CM

## 2025-07-17 DIAGNOSIS — J68.3 REACTIVE AIRWAYS DYSFUNCTION SYNDROME: ICD-10-CM

## 2025-07-17 DIAGNOSIS — J98.4 RESTRICTIVE LUNG DISEASE: ICD-10-CM

## 2025-07-17 DIAGNOSIS — J84.9 INTERSTITIAL PULMONARY DISEASE, UNSPECIFIED: Primary | ICD-10-CM

## 2025-07-17 DIAGNOSIS — M06.9 RHEUMATOID ARTHRITIS, INVOLVING UNSPECIFIED SITE, UNSPECIFIED WHETHER RHEUMATOID FACTOR PRESENT: Chronic | ICD-10-CM

## 2025-07-17 PROCEDURE — G2211 COMPLEX E/M VISIT ADD ON: HCPCS | Mod: S$GLB,,, | Performed by: INTERNAL MEDICINE

## 2025-07-17 PROCEDURE — 1100F PTFALLS ASSESS-DOCD GE2>/YR: CPT | Mod: CPTII,S$GLB,, | Performed by: INTERNAL MEDICINE

## 2025-07-17 PROCEDURE — 1125F AMNT PAIN NOTED PAIN PRSNT: CPT | Mod: CPTII,S$GLB,, | Performed by: INTERNAL MEDICINE

## 2025-07-17 PROCEDURE — 99999 PR PBB SHADOW E&M-EST. PATIENT-LVL III: CPT | Mod: PBBFAC,,, | Performed by: INTERNAL MEDICINE

## 2025-07-17 PROCEDURE — 1159F MED LIST DOCD IN RCRD: CPT | Mod: CPTII,S$GLB,, | Performed by: INTERNAL MEDICINE

## 2025-07-17 PROCEDURE — 3078F DIAST BP <80 MM HG: CPT | Mod: CPTII,S$GLB,, | Performed by: INTERNAL MEDICINE

## 2025-07-17 PROCEDURE — 99214 OFFICE O/P EST MOD 30 MIN: CPT | Mod: S$GLB,,, | Performed by: INTERNAL MEDICINE

## 2025-07-17 PROCEDURE — 3288F FALL RISK ASSESSMENT DOCD: CPT | Mod: CPTII,S$GLB,, | Performed by: INTERNAL MEDICINE

## 2025-07-17 PROCEDURE — 3074F SYST BP LT 130 MM HG: CPT | Mod: CPTII,S$GLB,, | Performed by: INTERNAL MEDICINE

## 2025-07-17 NOTE — ASSESSMENT & PLAN NOTE
Significant chronic condition to be followed longitudinally with following long term treatment plan.    Minimal changes on prior CT. Acute symptoms more concerning for infectious etiology. Treat for walking PNA. If no improvement, obtain repeat CT Chest.

## 2025-07-17 NOTE — PROGRESS NOTES
"Subjective:       Patient ID: Jocelyne Duncan is a 80 y.o. female.  The patient's last visit with me was on Visit date not found.     HPI  History of Present Illness    CHIEF COMPLAINT:  Patient presents today with worsening cough and phlegm    RESPIRATORY SYMPTOMS:  She reports persistent cough with phlegm for approximately 2 months. The cough is triggered by cold liquids with significant nighttime episodes that awaken her. Mucus production varies between yellow and clear. She endorses occasional mild chills but denies fever and night sweats. The cough is a new symptom not previously experienced and has been progressively bothersome. She reports ongoing shortness of breath with activity, describing her current status as "better" but continues to experience significant respiratory symptoms.    FATIGUE:  She reports significant fatigue and feeling very tired.    MEDICATIONS:  She takes Norava for rheumatoid arthritis, Prednisone 4 mg daily for an extended period, and Nexium daily in the morning.    RECENT STUDIES:  CT chest (04/24/2025) showed stable small pulmonary micronodules, minimal changes associated with an osteophyte, and no significant interstitial lung disease. PFTs (September 2024) demonstrated normal spirometry levels with low normal lung volumes and reduced diffusion capacity. CBC revealed mild anemia with eosinophil count of 400. BMP indicated mild hypokalemia with normal renal function. Echocardiogram (April 2024) showed mild pulmonary hypertension with PASP of 42, elevated from prior testing two years ago.        Review of Systems    Objective:      Vitals:    07/17/25 1450   BP: 107/71   Pulse: 76     Wt Readings from Last 3 Encounters:   07/17/25 63.9 kg (140 lb 14 oz)   10/28/24 64 kg (141 lb 1.5 oz)   09/23/24 63 kg (139 lb)     Temp Readings from Last 3 Encounters:   01/10/24 98.2 °F (36.8 °C) (Oral)   10/22/23 98.7 °F (37.1 °C) (Oral)   06/24/22 98.1 °F (36.7 °C) (Oral)     BP Readings from " Last 3 Encounters:   07/17/25 107/71   02/12/25 (!) 151/85   10/28/24 (!) 171/77     Pulse Readings from Last 3 Encounters:   07/17/25 76   02/12/25 75   10/28/24 73       Physical Exam   Constitutional: She appears well-developed and well-nourished.   Pulmonary/Chest: Effort normal. She has no wheezes. She has rhonchi. She has no rales.   Vitals reviewed.    CBC  Lab Results   Component Value Date    WBC 0-2 07/04/2025    HGB 10.7 (L) 01/10/2024    HCT 34.0 (L) 01/10/2024    MCV 90 01/10/2024     01/10/2024         CMP  Sodium   Date Value Ref Range Status   07/04/2025 139 135 - 146 mmol/L Final   11/11/2024 144 136 - 145 mmol/L Final     Potassium   Date Value Ref Range Status   07/04/2025 3.4 (L) 3.6 - 5.2 mmol/L Final   11/11/2024 3.3 (L) 3.5 - 5.1 mmol/L Final     Chloride   Date Value Ref Range Status   11/11/2024 106 95 - 110 mmol/L Final     CO2   Date Value Ref Range Status   11/11/2024 27 23 - 29 mmol/L Final     Carbon Dioxide   Date Value Ref Range Status   07/04/2025 23 (L) 24 - 32 mmol/L Final     Glucose   Date Value Ref Range Status   11/11/2024 97 70 - 110 mg/dL Final     BUN   Date Value Ref Range Status   07/04/2025 15.9 7.0 - 25.0 mg/dL Final   11/11/2024 17 8 - 23 mg/dL Final     Creatinine   Date Value Ref Range Status   07/04/2025 0.85 0.50 - 1.10 mg/dL Final   11/11/2024 0.8 0.5 - 1.4 mg/dL Final     Calcium   Date Value Ref Range Status   07/04/2025 8.1 (L) 8.4 - 10.3 mg/dL Final   11/11/2024 9.0 8.7 - 10.5 mg/dL Final     Total Protein   Date Value Ref Range Status   01/10/2024 6.3 6.0 - 8.4 g/dL Final     Albumin   Date Value Ref Range Status   07/04/2025 3.8 3.4 - 5.0 g/dL Final   01/10/2024 3.1 (L) 3.5 - 5.2 g/dL Final     Total Bilirubin   Date Value Ref Range Status   07/04/2025 0.5 <1.3 mg/dL Final   01/10/2024 0.4 0.1 - 1.0 mg/dL Final     Comment:     For infants and newborns, interpretation of results should be based  on gestational age, weight and in agreement with  "clinical  observations.    Premature Infant recommended reference ranges:  Up to 24 hours.............<8.0 mg/dL  Up to 48 hours............<12.0 mg/dL  3-5 days..................<15.0 mg/dL  6-29 days.................<15.0 mg/dL       Alkaline Phosphatase   Date Value Ref Range Status   01/10/2024 72 55 - 135 U/L Final     AST   Date Value Ref Range Status   07/04/2025 13 <45 U/L Final   01/10/2024 16 10 - 40 U/L Final     ALT   Date Value Ref Range Status   07/04/2025 8 <46 U/L Final   01/10/2024 8 (L) 10 - 44 U/L Final     Anion Gap   Date Value Ref Range Status   07/04/2025 11 8 - 16 Final     Comment:     Calculation does not include K+   11/11/2024 11 8 - 16 mmol/L Final     eGFR   Date Value Ref Range Status   07/04/2025 70 (L) >=90 mL/min/1.73m2 Final     Comment:     Calculation based on the Chronic Kidney Disease Epidemiology Collaboration (CKD-EPI) equation refit without adjustment for race.   11/11/2024 >60.0 >60 mL/min/1.73 m^2 Final       ABG  pH   Date Value Ref Range Status   07/04/2025 6.5 5.0 - 8.0 Final           Personal Diagnostic Review  I have personally reviewed the following data and added my own interpretation as below:        7/17/2025     2:50 PM 10/28/2024    10:12 AM 9/23/2024     2:02 PM 7/18/2024    11:27 AM 4/30/2024     3:01 PM 3/21/2024     1:55 PM 3/21/2024    12:59 PM   Pulmonary Function Tests   SpO2 95 %  97 %    92 %   Height 5' (1.524 m) 5' (1.524 m) 5' (1.524 m) 5' (1.524 m) 5' 1" (1.549 m) 5' 1" (1.549 m) 5' 2" (1.575 m)   Weight 63.9 kg (140 lb 14 oz) 64 kg (141 lb 1.5 oz) 63 kg (139 lb) 62.4 kg (137 lb 9.1 oz) 61.2 kg (135 lb) 61.6 kg (135 lb 12.9 oz) 61.6 kg (135 lb 12.9 oz)   BMI (Calculated) 27.5 27.6 27.1 26.9 25.5 25.7 24.8         Assessment:       1. Interstitial pulmonary disease, unspecified    2. Rheumatoid arthritis, involving unspecified site, unspecified whether rheumatoid factor present    3. Restrictive lung disease    4. Reactive airways dysfunction " syndrome    5. Pulmonary hypertension        Encounter Medications[1]  1. Interstitial pulmonary disease, unspecified  - CT Chest Without Contrast; Future    2. Rheumatoid arthritis, involving unspecified site, unspecified whether rheumatoid factor present    3. Restrictive lung disease    4. Reactive airways dysfunction syndrome    5. Pulmonary hypertension    Plan:     Problem List Items Addressed This Visit          Pulmonary    Restrictive lung disease    Reactive airways dysfunction syndrome    Current Assessment & Plan   Cont Breo and PRN albuterol. Not  of acute symptoms         Interstitial pulmonary disease, unspecified - Primary    Current Assessment & Plan   Significant chronic condition to be followed longitudinally with following long term treatment plan.    Minimal changes on prior CT. Acute symptoms more concerning for infectious etiology. Treat for walking PNA. If no improvement, obtain repeat CT Chest.         Relevant Orders    CT Chest Without Contrast       Cardiac/Vascular    Pulmonary hypertension    Overview   Noted on echo in 2022- follows with cardiology   Burn that this may be driving shortness of breath repeat echo is pending         Current Assessment & Plan   Consider repeat Echo when not having acute cough/mucus given PASP up in 2024 and known RA.            Immunology/Multi System    Rheumatoid arthritis (Chronic)       Assessment & Plan    M06.9 Rheumatoid arthritis, involving unspecified site, unspecified whether rheumatoid factor present  I27.20 Pulmonary hypertension  J84.9 Interstitial pulmonary disease, unspecified  J68.3 Reactive airways dysfunction syndrome  J98.4 Restrictive lung disease    IMPRESSION:  Reviewed CT chest from 04/24/2025, noting no significant interstitial lung disease and stable small pulmonary micronodules.  PFTs from September 2024 show normal spirometry with low normal lung volumes and reduced diffusion capacity.  Mild anemia on CBC and mild  hypokalemia on BMP.  Mild pulmonary HTN on April 2024 echo with elevated PASP of 42.  New symptoms of cough with phlegm, shortness of breath, and fatigue.  Potential infection given immunosuppressive medications for rheumatoid arthritis.  Started antibiotic treatment for possible walking pneumonia.    J84.9 INTERSTITIAL PULMONARY DISEASE, UNSPECIFIED:  - Ordered CT chest in 2 weeks.  - Contact the office to cancel scheduled CT chest if symptoms improve and feeling back to normal.    J68.3 REACTIVE AIRWAYS DYSFUNCTION SYNDROME:  - Changed Nexium administration time from morning to evening with dinner.  - Follow up in 10 days if not feeling back to normal after antibiotic course.    J98.4 RESTRICTIVE LUNG DISEASE:  - Ordered CT chest in 2 weeks.  - Contact the office to cancel scheduled CT chest if symptoms improve and feeling back to normal.          Orders Placed This Encounter    CT Chest Without Contrast     Standing Status:   Future     Expected Date:   7/31/2025     Expiration Date:   7/17/2026     May the Radiologist modify the order per protocol to meet the clinical needs of the patient?:   Yes       Please note Overview Notes are historic documentation. Please review A/P for current updates.  No follow-ups on file.    Future Appointments   Date Time Provider Department Center   7/30/2025 12:30 PM Say Casas, PT, DPT [facility]   7/31/2025  1:00 PM OC  CT1 [facility] CTSCAN [place]             [place] MD             [1]  Outpatient Encounter Medications as of 7/17/2025   Medication Sig Dispense Refill    acetaminophen (TYLENOL) 325 MG tablet Take 1 tablet by mouth every morning and 1 to 2 tablets every night at bedtime.      amLODIPine (NORVASC) 10 MG tablet Take 1 tablet (10 mg total) by mouth once daily.      atorvastatin (LIPITOR) 20 MG tablet Take 20 mg by mouth once daily.       BREO ELLIPTA 200-25 mcg/dose DsDv diskus inhaler Inhale 1 puff into the lungs once daily.       calcium carbonate/vitamin D3 (VITAMIN D-3 ORAL) Take 1 capsule by mouth once daily.      esomeprazole (NEXIUM) 40 MG capsule Take 40 mg by mouth once daily.      fluticasone propionate (FLONASE) 50 mcg/actuation nasal spray 1 spray (50 mcg total) by Each Nostril route 2 (two) times daily as needed for Rhinitis. 15 g 0    furosemide (LASIX) 20 MG tablet Take 20 mg by mouth every other day.      gabapentin (NEURONTIN) 100 MG capsule Take 100 mg by mouth 3 (three) times daily.      hydroCHLOROthiazide (HYDRODIURIL) 12.5 MG Tab Take 1 tablet (12.5 mg total) by mouth once daily. 90 tablet 3    leflunomide (ARAVA) 20 MG Tab Take 20 mg by mouth once daily.      losartan (COZAAR) 50 MG tablet Take 50 mg by mouth once daily.      predniSONE (DELTASONE) 1 MG tablet Take 2 mg by mouth every morning.      albuterol (PROVENTIL/VENTOLIN HFA) 90 mcg/actuation inhaler Inhale 2 puffs into the lungs every 6 (six) hours as needed for Wheezing or Shortness of Breath. (Patient not taking: Reported on 7/17/2025) 18 g 11    diclofenac sodium (VOLTAREN) 1 % Gel Apply 2 g topically 1 to 3 times daily as needed for pain.      doxazosin (CARDURA) 1 MG tablet Take 1 mg by mouth once daily. (Patient not taking: Reported on 7/17/2025)      EPINEPHrine (EPIPEN 2-CRYSTAL) 0.3 mg/0.3 mL AtIn Inject 0.3 mLs (0.3 mg total) into the muscle once. for 1 dose 0.3 mL 1    nystatin (MYCOSTATIN) cream Apply topically as needed. (Patient not taking: Reported on 7/17/2025)       No facility-administered encounter medications on file as of 7/17/2025.

## 2025-08-21 ENCOUNTER — CLINICAL SUPPORT (OUTPATIENT)
Dept: REHABILITATION | Facility: OTHER | Age: 80
End: 2025-08-21
Payer: MEDICARE

## 2025-08-21 DIAGNOSIS — R29.898 WEAKNESS OF RIGHT SHOULDER: Primary | ICD-10-CM

## 2025-08-21 DIAGNOSIS — M25.611 DECREASED RIGHT SHOULDER RANGE OF MOTION: ICD-10-CM

## 2025-08-21 DIAGNOSIS — G89.29 CENTRAL SENSITIZATION TO PAIN: ICD-10-CM

## 2025-08-21 PROCEDURE — 97110 THERAPEUTIC EXERCISES: CPT

## 2025-09-02 ENCOUNTER — CLINICAL SUPPORT (OUTPATIENT)
Dept: REHABILITATION | Facility: OTHER | Age: 80
End: 2025-09-02
Payer: MEDICARE

## 2025-09-02 DIAGNOSIS — G89.29 CENTRAL SENSITIZATION TO PAIN: ICD-10-CM

## 2025-09-02 DIAGNOSIS — M25.611 DECREASED RIGHT SHOULDER RANGE OF MOTION: ICD-10-CM

## 2025-09-02 DIAGNOSIS — R29.898 WEAKNESS OF RIGHT SHOULDER: Primary | ICD-10-CM

## 2025-09-02 PROCEDURE — 97110 THERAPEUTIC EXERCISES: CPT | Mod: CQ

## 2025-09-04 ENCOUNTER — CLINICAL SUPPORT (OUTPATIENT)
Dept: REHABILITATION | Facility: OTHER | Age: 80
End: 2025-09-04
Payer: MEDICARE

## 2025-09-04 DIAGNOSIS — R29.898 WEAKNESS OF RIGHT SHOULDER: Primary | ICD-10-CM

## 2025-09-04 DIAGNOSIS — G89.29 CENTRAL SENSITIZATION TO PAIN: ICD-10-CM

## 2025-09-04 DIAGNOSIS — M25.611 DECREASED RIGHT SHOULDER RANGE OF MOTION: ICD-10-CM

## 2025-09-04 PROCEDURE — 97110 THERAPEUTIC EXERCISES: CPT
